# Patient Record
Sex: MALE | Race: WHITE | Employment: OTHER | URBAN - METROPOLITAN AREA
[De-identification: names, ages, dates, MRNs, and addresses within clinical notes are randomized per-mention and may not be internally consistent; named-entity substitution may affect disease eponyms.]

---

## 2017-05-10 DIAGNOSIS — I10 ESSENTIAL HYPERTENSION WITH GOAL BLOOD PRESSURE LESS THAN 140/90: ICD-10-CM

## 2017-05-10 RX ORDER — AMLODIPINE BESYLATE 5 MG/1
TABLET ORAL
Qty: 90 TABLET | Refills: 0 | Status: SHIPPED | OUTPATIENT
Start: 2017-05-10 | End: 2017-08-11

## 2017-05-10 RX ORDER — LISINOPRIL 30 MG/1
TABLET ORAL
Qty: 90 TABLET | Refills: 0 | Status: SHIPPED | OUTPATIENT
Start: 2017-05-10 | End: 2017-08-11

## 2017-08-11 DIAGNOSIS — I10 ESSENTIAL HYPERTENSION WITH GOAL BLOOD PRESSURE LESS THAN 140/90: ICD-10-CM

## 2017-08-11 RX ORDER — AMLODIPINE BESYLATE 5 MG/1
TABLET ORAL
Qty: 30 TABLET | Refills: 0 | Status: SHIPPED | OUTPATIENT
Start: 2017-08-11 | End: 2017-09-08

## 2017-08-11 RX ORDER — LISINOPRIL 30 MG/1
TABLET ORAL
Qty: 30 TABLET | Refills: 0 | Status: SHIPPED | OUTPATIENT
Start: 2017-08-11 | End: 2017-09-08

## 2017-08-11 NOTE — TELEPHONE ENCOUNTER
Pt due for fasting lab appointment and ov for hypertension for further refills.  Saloni Lobato RN

## 2017-08-11 NOTE — LETTER
[facility]  [address] [facility] [address] [phone]    August 11, 2017    Owen Sahni  [address] [facility] [address]    Dear Owen,       We recently received a refill request for amLODIPine (NORVASC) and lisinopril (PRINIVIL,ZESTRIL.  We have refilled this for a one time 30 day supply only because you are due for a:    Blood pressure office visit and fasting lab appointment      Please schedule this lab appointment 4-5 days prior to the office visit.     Please call at your earliest convenience so that there will not be a delay with your future refills.          Thank you,   Your [facility] Care Team/brandon  [phone]

## 2017-08-14 DIAGNOSIS — I10 ESSENTIAL HYPERTENSION WITH GOAL BLOOD PRESSURE LESS THAN 140/90: ICD-10-CM

## 2017-08-15 RX ORDER — AMLODIPINE BESYLATE 5 MG/1
TABLET ORAL
Qty: 90 TABLET | Refills: 0 | OUTPATIENT
Start: 2017-08-15

## 2017-08-15 RX ORDER — LISINOPRIL 30 MG/1
TABLET ORAL
Qty: 90 TABLET | Refills: 0 | OUTPATIENT
Start: 2017-08-15

## 2017-09-08 DIAGNOSIS — I10 ESSENTIAL HYPERTENSION WITH GOAL BLOOD PRESSURE LESS THAN 140/90: ICD-10-CM

## 2017-09-11 NOTE — TELEPHONE ENCOUNTER
Routing refill request to provider for review/approval because:  Jerica given x1 and patient did not follow up, please advise  No pending appointment.     Aisha Allison RN   [facility]

## 2017-09-12 RX ORDER — AMLODIPINE BESYLATE 5 MG/1
5 TABLET ORAL DAILY
Qty: 90 TABLET | Refills: 0 | Status: SHIPPED | OUTPATIENT
Start: 2017-09-12 | End: 2017-12-11

## 2017-09-12 RX ORDER — LISINOPRIL 30 MG/1
TABLET ORAL
COMMUNITY
Start: 2017-09-12 | End: 2017-09-12

## 2017-09-12 RX ORDER — LISINOPRIL 30 MG/1
30 TABLET ORAL DAILY
Qty: 90 TABLET | Refills: 0 | Status: SHIPPED | OUTPATIENT
Start: 2017-09-12 | End: 2017-12-11

## 2017-09-12 RX ORDER — AMLODIPINE BESYLATE 5 MG/1
TABLET ORAL
COMMUNITY
Start: 2017-09-12 | End: 2017-09-12

## 2017-12-11 DIAGNOSIS — I10 ESSENTIAL HYPERTENSION WITH GOAL BLOOD PRESSURE LESS THAN 140/90: ICD-10-CM

## 2017-12-11 NOTE — LETTER
December 12, 2017    Owen Sahni  [address] [facility] [address]    Dear Owen,       We recently received a refill request for lisinopril and amlodipine.  We have refilled this for a one time 30 day supply only because you are due for a:    Blood pressure office visit with provider and fasting lab appointment      Please schedule this lab appointment 4-5 days prior to the office visit.     Please call at your earliest convenience so that there will not be a delay with your future refills.          Thank you,   Your [facility] Team/  [phone]

## 2017-12-12 RX ORDER — LISINOPRIL 30 MG/1
TABLET ORAL
Qty: 30 TABLET | Refills: 0 | Status: SHIPPED | OUTPATIENT
Start: 2017-12-12 | End: 2017-12-22

## 2017-12-12 RX ORDER — AMLODIPINE BESYLATE 5 MG/1
TABLET ORAL
Qty: 30 TABLET | Refills: 0 | Status: SHIPPED | OUTPATIENT
Start: 2017-12-12 | End: 2017-12-22

## 2017-12-12 NOTE — TELEPHONE ENCOUNTER
Medication is being filled for 1 time refill only due to:  Patient needs to be seen because due for fasting lab appt and ov.   Saloni Lobato RN

## 2017-12-19 NOTE — PROGRESS NOTES
SUBJECTIVE:   CC: Owen Sahni is an 57 year old male who presents for preventative health visit.     Healthy Habits:    Answers for HPI/ROS submitted by the patient on 12/22/2017   Annual Exam:  Getting at least 3 servings of Calcium per day:: Yes  Bi-annual eye exam:: Yes  Dental care twice a year:: Yes  Sleep apnea or symptoms of sleep apnea:: None  Diet:: Low salt  Frequency of exercise:: 2-3 days/week  Taking medications regularly:: Yes  Medication side effects:: Other  Additional concerns today:: No  PHQ-2 Score: 0  Duration of exercise:: Less than 15 minutes        .  Loads truck.   Single. No children.    Morbid obesity.     HTN-due for fasting labs.   No chest pain, shortness of breath, edema, PND, or orthopnea. No dizziness or vision changes. No side effects from medications. Blood pressure has been stable on medication.    No PND, orthopnea, or leg edema.      H/O cardiomyopathy seen by cardiology in 2015.               Today's PHQ-2 Score:   PHQ-2 ( 1999 [employer]) 12/22/2017 6/6/2016   Q1: Little interest or pleasure in doing things 0 0   Q2: Feeling down, depressed or hopeless 0 0   PHQ-2 Score 0 0   Q1: Little interest or pleasure in doing things Not at all -   Q2: Feeling down, depressed or hopeless Not at all -   PHQ-2 Score 0 -         Abuse: Current or Past(Physical, Sexual or Emotional)- No  Do you feel safe in your environment - Yes  Social History   Substance Use Topics     Smoking status: Never Smoker     Smokeless tobacco: Never Used      Comment: non-smoking household     Alcohol use Yes      Comment: 6 beers weekly      If you drink alcohol do you typically have >3 drinks per day or >7 drinks per week? No                      Last PSA:   PSA   Date Value Ref Range Status   06/06/2016 0.85 0 - 4 ug/L Final       Reviewed orders with patient. Reviewed health maintenance and updated orders accordingly - Yes  BP Readings from Last 3 Encounters:   12/22/17 130/82   11/10/16 138/84    06/06/16 148/88    Wt Readings from Last 3 Encounters:   12/22/17 288 lb (130.6 kg)   11/10/16 286 lb (129.7 kg)   06/06/16 274 lb (124.3 kg)                  Patient Active Problem List   Diagnosis     Sleep apnea     Lumbosacral radiculopathy     CARDIOVASCULAR SCREENING; LDL GOAL LESS THAN 130     Abnormal EKG     SOB (shortness of breath)     Seafood allergy     AK (actinic keratosis)     Myalgia     Advanced directives, counseling/discussion     Essential hypertension with goal blood pressure less than 140/90     Benign essential hypertension     Hypertrophic obstructive cardiomyopathy (H)     Systolic murmur     Past Surgical History:   Procedure Laterality Date     COLONOSCOPY  4/12/2012    Procedure:COLONOSCOPY; Colonoscopy, screening; Surgeon:ARANZA TEE; Location: OR       Social History   Substance Use Topics     Smoking status: Never Smoker     Smokeless tobacco: Never Used      Comment: non-smoking household     Alcohol use Yes      Comment: 6 beers weekly     Family History   Problem Relation Age of Onset     CANCER Mother      CANCER Father      Hypertension Father      Arrhythmia No family hx of      Myocardial Infarction No family hx of      DIABETES No family hx of      Coronary Artery Disease No family hx of      CEREBROVASCULAR DISEASE No family hx of          Current Outpatient Prescriptions   Medication Sig Dispense Refill     amLODIPine (NORVASC) 5 MG tablet TAKE 1 TABLET (5 MG) BY MOUTH DAILY 90 tablet 3     lisinopril (PRINIVIL,ZESTRIL) 30 MG tablet Take 1 tablet (30 mg) by mouth daily 90 tablet 3     aspirin (SB LOW DOSE ASA EC) 81 MG EC tablet Take 81 mg by mouth daily       multivitamin, therapeutic with minerals (MULTI-VITAMIN) TABS Take 1 tablet by mouth daily.       [DISCONTINUED] amLODIPine (NORVASC) 5 MG tablet TAKE 1 TABLET (5 MG) BY MOUTH DAILY 30 tablet 0     [DISCONTINUED] lisinopril (PRINIVIL,ZESTRIL) 30 MG tablet TAKE 1 TABLET (30 MG) BY MOUTH DAILY 30 tablet 0  "          Reviewed and updated as needed this visit by clinical staffTobacco  Allergies  Meds  Med Hx  Surg Hx  Fam Hx  Soc Hx        Reviewed and updated as needed this visit by Provider        Past Medical History:   Diagnosis Date     Hypertension       Past Surgical History:   Procedure Laterality Date     COLONOSCOPY  4/12/2012    Procedure:COLONOSCOPY; Colonoscopy, screening; Surgeon:ARANZA TEE; Location:[facility] OR       ROS:  C: NEGATIVE for fever, chills, change in weight  I: NEGATIVE for worrisome rashes, moles or lesions  E: NEGATIVE for vision changes or irritation  ENT: NEGATIVE for ear, mouth and throat problems  R: NEGATIVE for significant cough or SOB  CV: NEGATIVE for chest pain, palpitations or peripheral edema  GI: NEGATIVE for nausea, abdominal pain, heartburn, or change in bowel habits   male: negative for dysuria, hematuria, decreased urinary stream, erectile dysfunction, urethral discharge  M: NEGATIVE for significant arthralgias or myalgia  N: NEGATIVE for weakness, dizziness or paresthesias  P: NEGATIVE for changes in mood or affect    OBJECTIVE:   /89  Pulse 85  Temp 98.2  F (36.8  C) (Oral)  Ht 5' 11\" (1.803 m)  Wt 288 lb (130.6 kg)  SpO2 96%  BMI 40.17 kg/m2  EXAM:  Genera: obese, no distress  EYES: Eyes grossly normal to inspection, PERRL and conjunctivae and sclerae normal  HENT: ear canals and TM's normal, nose and mouth without ulcers or lesions  NECK: no adenopathy, no asymmetry, masses, or scars and thyroid normal to palpation  RESP: lungs clear to auscultation - no rales, rhonchi or wheezes  CV: harsh systolic murmur, as previously noted by cardiology, no change  ABDOMEN: soft, nontender, no hepatosplenomegaly, no masses and bowel sounds normal  MS: no gross musculoskeletal defects noted, no edema  SKIN: no suspicious lesions or rashes  NEURO: Normal strength and tone, mentation intact and speech normal  PSYCH: mentation appears normal, affect " "normal/bright    ASSESSMENT/PLAN:   1. Encounter for routine adult health examination with abnormal findings      2. Morbid obesity (H)      3. Benign essential hypertension    - Comprehensive metabolic panel    4. Screening for diabetes mellitus      5. Lipid screening    - Lipid panel reflex to direct LDL Fasting    6. Screening for prostate cancer    - PSA, screen    7. Essential hypertension with goal blood pressure less than 140/90  To goal  Recheck 1 year    - amLODIPine (NORVASC) 5 MG tablet; TAKE 1 TABLET (5 MG) BY MOUTH DAILY  Dispense: 90 tablet; Refill: 3  - lisinopril (PRINIVIL,ZESTRIL) 30 MG tablet; Take 1 tablet (30 mg) by mouth daily  Dispense: 90 tablet; Refill: 3    COUNSELING:  Reviewed preventive health counseling, as reflected in patient instructions       Regular exercise       Healthy diet/nutrition       Vision screening       Hearing screening       reports that he has never smoked. He has never used smokeless tobacco.      Estimated body mass index is 40.17 kg/(m^2) as calculated from the following:    Height as of this encounter: 5' 11\" (1.803 m).    Weight as of this encounter: 288 lb (130.6 kg).   Weight management plan: Discussed healthy diet and exercise guidelines and patient will follow up in 12 months in clinic to re-evaluate.    Counseling Resources:  ATP IV Guidelines  Pooled Cohorts Equation Calculator  FRAX Risk Assessment  ICSI Preventive Guidelines  Dietary Guidelines for Americans, 2010  USDA's MyPlate  ASA Prophylaxis  Lung CA Screening      Patient Instructions   Lifestyle recommendations:  Being overweight or obese puts you are risk of major health problems including but not limited to: heart disease/heart attack, stroke, high cholesterol, high blood pressure, and diabetes.  This is why it is important to be at a healthy weight for your height.     Exercise 30 minutes 3-5 times a week, if you can only do 10 minutes 3 times a week that is still shown to have great benefit!  " Brisk walking even counts for this.  Consider free youtube videos for exercise that fits your needs and lifestyle.     Monitor your caffeine and soda intake, try to minimize these beverages    Drink plenty of water (about 70-80 ounces a day)    Try to eat a vegetable and fruit  with lunch and dinner.  Have a breakfast that contains protein such as eggs or oatmeal.  Decrease your white bread, pasta, and sweets intake.  Increase lean proteins like chicken or pork. Try to eat out 1-2 times a week or less.  Monitor your portion sizes, try using smaller plates if needed.  Eat slowly, this gives you time to be aware that your body is full.   Let me know at any time if you would like a referral to a nutritionist!        Preventive Health Recommendations  Male Ages 50 - 64    Yearly exam:             See your health care provider every year in order to  o   Review health changes.   o   Discuss preventive care.    o   Review your medicines if your doctor has prescribed any.     Have a cholesterol test every 5 years, or more frequently if you are at risk for high cholesterol/heart disease.     Have a diabetes test (fasting glucose) every three years. If you are at risk for diabetes, you should have this test more often.     Have a colonoscopy at age 50, or have a yearly FIT test (stool test). These exams will check for colon cancer.      Talk with your health care provider about whether or not a prostate cancer screening test (PSA) is right for you.    You should be tested each year for STDs (sexually transmitted diseases), if you re at risk.     Shots: Get a flu shot each year. Get a tetanus shot every 10 years.     Nutrition:    Eat at least 5 servings of fruits and vegetables daily.     Eat whole-grain bread, whole-wheat pasta and brown rice instead of white grains and rice.     Talk to your provider about Calcium and Vitamin D.     Lifestyle    Exercise for at least 150 minutes a week (30 minutes a day, 5 days a week).  This will help you control your weight and prevent disease.     Limit alcohol to one drink per day.     No smoking.     Wear sunscreen to prevent skin cancer.     See your dentist every six months for an exam and cleaning.     See your eye doctor every 1 to 2 years.        Tracie Fishman PA-C  [facility]

## 2017-12-19 NOTE — PATIENT INSTRUCTIONS
Lifestyle recommendations:  Being overweight or obese puts you are risk of major health problems including but not limited to: heart disease/heart attack, stroke, high cholesterol, high blood pressure, and diabetes.  This is why it is important to be at a healthy weight for your height.     Exercise 30 minutes 3-5 times a week, if you can only do 10 minutes 3 times a week that is still shown to have great benefit!  Brisk walking even counts for this.  Consider free youtube videos for exercise that fits your needs and lifestyle.     Monitor your caffeine and soda intake, try to minimize these beverages    Drink plenty of water (about 70-80 ounces a day)    Try to eat a vegetable and fruit  with lunch and dinner.  Have a breakfast that contains protein such as eggs or oatmeal.  Decrease your white bread, pasta, and sweets intake.  Increase lean proteins like chicken or pork. Try to eat out 1-2 times a week or less.  Monitor your portion sizes, try using smaller plates if needed.  Eat slowly, this gives you time to be aware that your body is full.   Let me know at any time if you would like a referral to a nutritionist!        Preventive Health Recommendations  Male Ages 50   64    Yearly exam:             See your health care provider every year in order to  o   Review health changes.   o   Discuss preventive care.    o   Review your medicines if your doctor has prescribed any.     Have a cholesterol test every 5 years, or more frequently if you are at risk for high cholesterol/heart disease.     Have a diabetes test (fasting glucose) every three years. If you are at risk for diabetes, you should have this test more often.     Have a colonoscopy at age 50, or have a yearly FIT test (stool test). These exams will check for colon cancer.      Talk with your health care provider about whether or not a prostate cancer screening test (PSA) is right for you.    You should be tested each year for STDs (sexually transmitted  diseases), if you re at risk.     Shots: Get a flu shot each year. Get a tetanus shot every 10 years.     Nutrition:    Eat at least 5 servings of fruits and vegetables daily.     Eat whole-grain bread, whole-wheat pasta and brown rice instead of white grains and rice.     Talk to your provider about Calcium and Vitamin D.     Lifestyle    Exercise for at least 150 minutes a week (30 minutes a day, 5 days a week). This will help you control your weight and prevent disease.     Limit alcohol to one drink per day.     No smoking.     Wear sunscreen to prevent skin cancer.     See your dentist every six months for an exam and cleaning.     See your eye doctor every 1 to 2 years.

## 2017-12-22 ENCOUNTER — OFFICE VISIT (OUTPATIENT)
Dept: FAMILY MEDICINE | Facility: CLINIC | Age: 57
End: 2017-12-22
Payer: COMMERCIAL

## 2017-12-22 VITALS
TEMPERATURE: 98.2 F | SYSTOLIC BLOOD PRESSURE: 130 MMHG | BODY MASS INDEX: 40.32 KG/M2 | HEIGHT: 71 IN | DIASTOLIC BLOOD PRESSURE: 82 MMHG | HEART RATE: 85 BPM | WEIGHT: 288 LBS | OXYGEN SATURATION: 96 %

## 2017-12-22 DIAGNOSIS — Z13.1 SCREENING FOR DIABETES MELLITUS: ICD-10-CM

## 2017-12-22 DIAGNOSIS — Z13.220 LIPID SCREENING: ICD-10-CM

## 2017-12-22 DIAGNOSIS — Z12.5 SCREENING FOR PROSTATE CANCER: ICD-10-CM

## 2017-12-22 DIAGNOSIS — Z00.01 ENCOUNTER FOR ROUTINE ADULT HEALTH EXAMINATION WITH ABNORMAL FINDINGS: Primary | ICD-10-CM

## 2017-12-22 DIAGNOSIS — I10 BENIGN ESSENTIAL HYPERTENSION: ICD-10-CM

## 2017-12-22 DIAGNOSIS — E66.01 MORBID OBESITY (H): ICD-10-CM

## 2017-12-22 DIAGNOSIS — I10 ESSENTIAL HYPERTENSION WITH GOAL BLOOD PRESSURE LESS THAN 140/90: ICD-10-CM

## 2017-12-22 PROBLEM — R01.1 SYSTOLIC MURMUR: Status: ACTIVE | Noted: 2017-12-22

## 2017-12-22 PROBLEM — I42.1 HYPERTROPHIC OBSTRUCTIVE CARDIOMYOPATHY (H): Status: ACTIVE | Noted: 2017-12-22

## 2017-12-22 LAB
ALBUMIN SERPL-MCNC: 3.8 G/DL (ref 3.4–5)
ALP SERPL-CCNC: 64 U/L (ref 40–150)
ALT SERPL W P-5'-P-CCNC: 60 U/L (ref 0–70)
ANION GAP SERPL CALCULATED.3IONS-SCNC: 9 MMOL/L (ref 3–14)
AST SERPL W P-5'-P-CCNC: 26 U/L (ref 0–45)
BILIRUB SERPL-MCNC: 0.2 MG/DL (ref 0.2–1.3)
BUN SERPL-MCNC: 20 MG/DL (ref 7–30)
CALCIUM SERPL-MCNC: 9 MG/DL (ref 8.5–10.1)
CHLORIDE SERPL-SCNC: 107 MMOL/L (ref 94–109)
CHOLEST SERPL-MCNC: 195 MG/DL
CO2 SERPL-SCNC: 24 MMOL/L (ref 20–32)
CREAT SERPL-MCNC: 1 MG/DL (ref 0.66–1.25)
GFR SERPL CREATININE-BSD FRML MDRD: 77 ML/MIN/1.7M2
GLUCOSE SERPL-MCNC: 99 MG/DL (ref 70–99)
HDLC SERPL-MCNC: 50 MG/DL
LDLC SERPL CALC-MCNC: 120 MG/DL
NONHDLC SERPL-MCNC: 145 MG/DL
POTASSIUM SERPL-SCNC: 4.3 MMOL/L (ref 3.4–5.3)
PROT SERPL-MCNC: 7.6 G/DL (ref 6.8–8.8)
PSA SERPL-ACNC: 0.67 UG/L (ref 0–4)
SODIUM SERPL-SCNC: 140 MMOL/L (ref 133–144)
TRIGL SERPL-MCNC: 125 MG/DL

## 2017-12-22 PROCEDURE — G0103 PSA SCREENING: HCPCS | Performed by: PHYSICIAN ASSISTANT

## 2017-12-22 PROCEDURE — 80061 LIPID PANEL: CPT | Performed by: PHYSICIAN ASSISTANT

## 2017-12-22 PROCEDURE — 80053 COMPREHEN METABOLIC PANEL: CPT | Performed by: PHYSICIAN ASSISTANT

## 2017-12-22 PROCEDURE — 99396 PREV VISIT EST AGE 40-64: CPT | Mod: 25 | Performed by: PHYSICIAN ASSISTANT

## 2017-12-22 PROCEDURE — 36415 COLL VENOUS BLD VENIPUNCTURE: CPT | Performed by: PHYSICIAN ASSISTANT

## 2017-12-22 RX ORDER — AMLODIPINE BESYLATE 5 MG/1
TABLET ORAL
Qty: 90 TABLET | Refills: 3 | Status: SHIPPED | OUTPATIENT
Start: 2017-12-22 | End: 2018-06-08

## 2017-12-22 RX ORDER — LISINOPRIL 30 MG/1
30 TABLET ORAL DAILY
Qty: 90 TABLET | Refills: 3 | Status: SHIPPED | OUTPATIENT
Start: 2017-12-22 | End: 2018-03-16

## 2017-12-22 NOTE — PROGRESS NOTES
Dear Owen,      It was a pleasure to see you at your recent office visit.  Your test results are listed below.  Prostate screening is negative/normal. Cholesterol has improved. Kidney function and liver function normal. Blood sugar normal no diabetes.         If you have any questions or concerns, please call the clinic at [phone].    Sincerely,  Tracie Fishman PA-C

## 2017-12-22 NOTE — NURSING NOTE
"Chief Complaint   Patient presents with     Physical     LINH, pt is fasting        Initial /89  Pulse 85  Temp 98.2  F (36.8  C) (Oral)  Ht 5' 11\" (1.803 m)  Wt 288 lb (130.6 kg)  SpO2 96%  BMI 40.17 kg/m2 Estimated body mass index is 40.17 kg/(m^2) as calculated from the following:    Height as of this encounter: 5' 11\" (1.803 m).    Weight as of this encounter: 288 lb (130.6 kg).  Medication Reconciliation: complete      Giuseppe Chin MA    "

## 2017-12-22 NOTE — LETTER
December 22, 2017    Owen Sahni  [address] [facility] [address]        Dear Owen,    It was a pleasure to see you at your recent office visit.  Your test results are listed below.  Prostate screening is negative/normal. Cholesterol has improved. Kidney function and liver function normal. Blood sugar normal no diabetes.         If you have any questions or concerns, please call the clinic at [phone].    Sincerely,  Tracie Fishman PA-C    Results for orders placed or performed in visit on 12/22/17   Comprehensive metabolic panel   Result Value Ref Range    Sodium 140 133 - 144 mmol/L    Potassium 4.3 3.4 - 5.3 mmol/L    Chloride 107 94 - 109 mmol/L    Carbon Dioxide 24 20 - 32 mmol/L    Anion Gap 9 3 - 14 mmol/L    Glucose 99 70 - 99 mg/dL    Urea Nitrogen 20 7 - 30 mg/dL    Creatinine 1.00 0.66 - 1.25 mg/dL    GFR Estimate 77 >60 mL/min/1.7m2    GFR Estimate If Black >90 >60 mL/min/1.7m2    Calcium 9.0 8.5 - 10.1 mg/dL    Bilirubin Total 0.2 0.2 - 1.3 mg/dL    Albumin 3.8 3.4 - 5.0 g/dL    Protein Total 7.6 6.8 - 8.8 g/dL    Alkaline Phosphatase 64 40 - 150 U/L    ALT 60 0 - 70 U/L    AST 26 0 - 45 U/L   Lipid panel reflex to direct LDL Fasting   Result Value Ref Range    Cholesterol 195 <200 mg/dL    Triglycerides 125 <150 mg/dL    HDL Cholesterol 50 >39 mg/dL    LDL Cholesterol Calculated 120 (H) <100 mg/dL    Non HDL Cholesterol 145 (H) <130 mg/dL   PSA, screen   Result Value Ref Range    PSA 0.67 0 - 4 ug/L

## 2017-12-22 NOTE — MR AVS SNAPSHOT
After Visit Summary   12/22/2017    Owen Sahni    MRN: [id number]           Patient Information     Date Of Birth          1960        Visit Information        Provider Department      12/22/2017 7:20 AM Tracie Fishman PA-C [facility]        Today's Diagnoses     Encounter for routine adult health examination with abnormal findings    -  1    Morbid obesity (H)        Benign essential hypertension        Screening for diabetes mellitus        Lipid screening        Screening for prostate cancer        Essential hypertension with goal blood pressure less than 140/90          Care Instructions    Lifestyle recommendations:  Being overweight or obese puts you are risk of major health problems including but not limited to: heart disease/heart attack, stroke, high cholesterol, high blood pressure, and diabetes.  This is why it is important to be at a healthy weight for your height.     Exercise 30 minutes 3-5 times a week, if you can only do 10 minutes 3 times a week that is still shown to have great benefit!  Brisk walking even counts for this.  Consider free youtube videos for exercise that fits your needs and lifestyle.     Monitor your caffeine and soda intake, try to minimize these beverages    Drink plenty of water (about 70-80 ounces a day)    Try to eat a vegetable and fruit  with lunch and dinner.  Have a breakfast that contains protein such as eggs or oatmeal.  Decrease your white bread, pasta, and sweets intake.  Increase lean proteins like chicken or pork. Try to eat out 1-2 times a week or less.  Monitor your portion sizes, try using smaller plates if needed.  Eat slowly, this gives you time to be aware that your body is full.   Let me know at any time if you would like a referral to a nutritionist!        Preventive Health Recommendations  Male Ages 50 - 64    Yearly exam:             See your health care provider every year in order to  o   Review health  changes.   o   Discuss preventive care.    o   Review your medicines if your doctor has prescribed any.     Have a cholesterol test every 5 years, or more frequently if you are at risk for high cholesterol/heart disease.     Have a diabetes test (fasting glucose) every three years. If you are at risk for diabetes, you should have this test more often.     Have a colonoscopy at age 50, or have a yearly FIT test (stool test). These exams will check for colon cancer.      Talk with your health care provider about whether or not a prostate cancer screening test (PSA) is right for you.    You should be tested each year for STDs (sexually transmitted diseases), if you re at risk.     Shots: Get a flu shot each year. Get a tetanus shot every 10 years.     Nutrition:    Eat at least 5 servings of fruits and vegetables daily.     Eat whole-grain bread, whole-wheat pasta and brown rice instead of white grains and rice.     Talk to your provider about Calcium and Vitamin D.     Lifestyle    Exercise for at least 150 minutes a week (30 minutes a day, 5 days a week). This will help you control your weight and prevent disease.     Limit alcohol to one drink per day.     No smoking.     Wear sunscreen to prevent skin cancer.     See your dentist every six months for an exam and cleaning.     See your eye doctor every 1 to 2 years.            Follow-ups after your visit        Who to contact     If you have questions or need follow up information about today's clinic visit or your schedule please contact [facility] directly at [phone].  Normal or non-critical lab and imaging results will be communicated to you by MyChart, letter or phone within 4 business days after the clinic has received the results. If you do not hear from us within 7 days, please contact the clinic through MyChart or phone. If you have a critical or abnormal lab result, we will notify you by phone as soon as possible.  Submit refill requests  "through [employer] or call your pharmacy and they will forward the refill request to us. Please allow 3 business days for your refill to be completed.          Additional Information About Your Visit        Care EveryWhere ID     This is your Care EveryWhere ID. This could be used by other organizations to access your [facility] medical records  [id number]        Your Vitals Were     Pulse Temperature Height Pulse Oximetry BMI (Body Mass Index)       85 98.2  F (36.8  C) (Oral) 5' 11\" (1.803 m) 96% 40.17 kg/m2        Blood Pressure from Last 3 Encounters:   12/22/17 130/82   11/10/16 138/84   06/06/16 148/88    Weight from Last 3 Encounters:   12/22/17 288 lb (130.6 kg)   11/10/16 286 lb (129.7 kg)   06/06/16 274 lb (124.3 kg)              We Performed the Following     Comprehensive metabolic panel     Lipid panel reflex to direct LDL Fasting     PSA, screen          Today's Medication Changes          These changes are accurate as of: 12/22/17  7:50 AM.  If you have any questions, ask your nurse or doctor.               These medicines have changed or have updated prescriptions.        Dose/Directions    amLODIPine 5 MG tablet   Commonly known as:  NORVASC   This may have changed:  See the new instructions.   Used for:  Essential hypertension with goal blood pressure less than 140/90   Changed by:  Tracie Fishman PA-C        TAKE 1 TABLET (5 MG) BY MOUTH DAILY   Quantity:  90 tablet   Refills:  3       lisinopril 30 MG tablet   Commonly known as:  PRINIVIL,ZESTRIL   This may have changed:  See the new instructions.   Used for:  Essential hypertension with goal blood pressure less than 140/90   Changed by:  Tracie Fishman PA-C        Dose:  30 mg   Take 1 tablet (30 mg) by mouth daily   Quantity:  90 tablet   Refills:  3            Where to get your medicines      These medications were sent to [facility]/pharmacy #[id number] - [place], [place] - [address],  AT [address] [place] [address] " [address] [facility] [address]     Phone:  [phone]     amLODIPine 5 MG tablet    lisinopril 30 MG tablet                Primary Care Provider Office Phone # Fax #    Braulio Coronel [phone]       [address] [facility] [address]        Equal Access to Services     ISHAN BONILLA : Hadii aad ku hadasho Soomaali, waaxda luqadaha, qaybta kaalmada adeegyada, waxay idiin hayaan adeeg kharash la'aan . So [facility] [phone].    ATENCIÓN: Si habla español, tiene a levy disposición servicios gratuitos de asistencia lingüística. Llame al [phone].    We comply with applicable federal civil rights laws and [place] laws. We do not discriminate on the basis of race, color, national origin, age, disability, sex, sexual orientation, or gender identity.            Thank you!     Thank you for choosing [facility]  for your care. Our goal is always to provide you with excellent care. Hearing back from our patients is one way we can continue to improve our services. Please take a few minutes to complete the written survey that you may receive in the mail after your visit with us. Thank you!             Your Updated Medication List - Protect others around you: Learn how to safely use, store and throw away your medicines at www.disposemymeds.org.          This list is accurate as of: 12/22/17  7:50 AM.  Always use your most recent med list.                   Brand Name Dispense Instructions for use Diagnosis    amLODIPine 5 MG tablet    NORVASC    90 tablet    TAKE 1 TABLET (5 MG) BY MOUTH DAILY    Essential hypertension with goal blood pressure less than 140/90       lisinopril 30 MG tablet    PRINIVIL,ZESTRIL    90 tablet    Take 1 tablet (30 mg) by mouth daily    Essential hypertension with goal blood pressure less than 140/90       Multi-vitamin Tabs tablet      Take 1 tablet by mouth daily.        SB LOW DOSE ASA EC 81 MG EC tablet   Generic drug:  aspirin      Take 81 mg by  mouth daily

## 2018-03-09 ENCOUNTER — TELEPHONE (OUTPATIENT)
Dept: FAMILY MEDICINE | Facility: CLINIC | Age: 58
End: 2018-03-09

## 2018-03-09 NOTE — LETTER
March 9, 2018    Owen Sahni  [address] [facility] [address]        RE: Owen Sahni     Patient has been seen and treated for years at our clinic for hypertension.  He has been fairly well controlled on his medications and stable.  There have been no recent changes to medications.    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      Please contact me for questions or concerns.        Sincerely,           Tracie Fishman PA-C

## 2018-03-09 NOTE — TELEPHONE ENCOUNTER
Patient states that he faxed something to Tracie that has to be completed and faxed back right away as he is having a DOT physical right now and needs it for it.    Thank you.

## 2018-03-09 NOTE — TELEPHONE ENCOUNTER
Got fax from [employer] requesting a letter from Tracie Fishman in regards to Chronic Medical conditions requiring medication.    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    Tori Templeton MA/TC

## 2018-03-09 NOTE — TELEPHONE ENCOUNTER
Faxed note to [employer] @ [phone]. Called and informed patient.Tori Templeton MA/SLADE

## 2018-03-09 NOTE — LETTER
[facility]  [address] [facility] [place] [address]  Phone: [phone]    March 9, 2018        Owen Sahni  [address] [facility] [facility] [address]          To whom it may concern:    RE: Owen Sahni    Patient has been seen and treated for years at our clinic for hypertension.  He has been fairly well controlled on his medications and stable.  There have been no recent changes to medications.    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     Please contact me for questions or concerns.      Sincerely,        MARIO SHEETS MD

## 2018-03-09 NOTE — TELEPHONE ENCOUNTER
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     Tracie Fishman PA-C

## 2018-03-16 ENCOUNTER — OFFICE VISIT (OUTPATIENT)
Dept: FAMILY MEDICINE | Facility: CLINIC | Age: 58
End: 2018-03-16
Payer: COMMERCIAL

## 2018-03-16 VITALS
BODY MASS INDEX: 42.7 KG/M2 | WEIGHT: 305 LBS | SYSTOLIC BLOOD PRESSURE: 142 MMHG | HEIGHT: 71 IN | DIASTOLIC BLOOD PRESSURE: 80 MMHG | HEART RATE: 74 BPM

## 2018-03-16 DIAGNOSIS — R01.1 SYSTOLIC MURMUR: ICD-10-CM

## 2018-03-16 DIAGNOSIS — I10 BENIGN ESSENTIAL HYPERTENSION: Primary | ICD-10-CM

## 2018-03-16 PROCEDURE — 99214 OFFICE O/P EST MOD 30 MIN: CPT | Performed by: PHYSICIAN ASSISTANT

## 2018-03-16 RX ORDER — LOSARTAN POTASSIUM 50 MG/1
50 TABLET ORAL DAILY
Qty: 30 TABLET | Refills: 1 | Status: SHIPPED | OUTPATIENT
Start: 2018-03-16 | End: 2018-05-17

## 2018-03-16 RX ORDER — HYDROCHLOROTHIAZIDE 12.5 MG/1
12.5 TABLET ORAL DAILY
Qty: 30 TABLET | Refills: 0 | Status: SHIPPED | OUTPATIENT
Start: 2018-03-16 | End: 2018-04-14

## 2018-03-16 NOTE — PATIENT INSTRUCTIONS
Recheck blood pressure in 2-3 weeks  Stop lisinopril start losartan instead, monitor cough this should improve  Continue your other blood pressure medications  Add HCTZ in the morning for blood pressure, this will make you pee more in the morning likely  We will repeat kidney function at next visit

## 2018-03-16 NOTE — MR AVS SNAPSHOT
After Visit Summary   3/16/2018    Owen Sahni    MRN: [id number]           Patient Information     Date Of Birth          1960        Visit Information        Provider Department      3/16/2018 9:20 AM Tracie Fishman PA-C [facility]        Today's Diagnoses     Benign essential hypertension    -  1      Care Instructions    Recheck blood pressure in 2-3 weeks  Stop lisinopril start losartan instead, monitor cough this should improve  Continue your other blood pressure medications  Add HCTZ in the morning for blood pressure, this will make you pee more in the morning likely  We will repeat kidney function at next visit              Follow-ups after your visit        Future tests that were ordered for you today     Open Future Orders        Priority Expected Expires Ordered    Basic metabolic panel Routine  6/16/2018 3/16/2018    BNP-N terminal pro Routine  6/16/2018 3/16/2018            Who to contact     If you have questions or need follow up information about today's clinic visit or your schedule please contact [facility] directly at [phone].  Normal or non-critical lab and imaging results will be communicated to you by MyChart, letter or phone within 4 business days after the clinic has received the results. If you do not hear from us within 7 days, please contact the clinic through MyChart or phone. If you have a critical or abnormal lab result, we will notify you by phone as soon as possible.  Submit refill requests through [employer] or call your pharmacy and they will forward the refill request to us. Please allow 3 business days for your refill to be completed.          Additional Information About Your Visit        Care EveryWhere ID     This is your Care EveryWhere ID. This could be used by other organizations to access your [facility] medical records  [id number]        Your Vitals Were     Pulse Height BMI (Body Mass Index)             74  "5' 11\" (1.803 m) 42.54 kg/m2          Blood Pressure from Last 3 Encounters:   03/16/18 142/80   12/22/17 130/82   11/10/16 138/84    Weight from Last 3 Encounters:   03/16/18 (!) 305 lb (138.3 kg)   12/22/17 288 lb (130.6 kg)   11/10/16 286 lb (129.7 kg)                 Today's Medication Changes          These changes are accurate as of 3/16/18  9:58 AM.  If you have any questions, ask your nurse or doctor.               Start taking these medicines.        Dose/Directions    hydrochlorothiazide 12.5 MG Tabs tablet   Used for:  Benign essential hypertension   Started by:  Tracie Fishman PA-C        Dose:  12.5 mg   Take 1 tablet (12.5 mg) by mouth daily Take in the morning   Quantity:  30 tablet   Refills:  0       losartan 50 MG tablet   Commonly known as:  COZAAR   Used for:  Benign essential hypertension   Started by:  Tracie Fishman PA-C        Dose:  50 mg   Take 1 tablet (50 mg) by mouth daily Stop lisinopril.  Start this instead.   Quantity:  30 tablet   Refills:  1         Stop taking these medicines if you haven't already. Please contact your care team if you have questions.     lisinopril 30 MG tablet   Commonly known as:  PRINIVIL,ZESTRIL   Stopped by:  Tracie Fishman PA-C                Where to get your medicines      These medications were sent to [facility]/pharmacy #[id number] - [address], [facility] [address]     Phone:  [phone]     hydrochlorothiazide 12.5 MG Tabs tablet    losartan 50 MG tablet                Primary Care Provider Office Phone # Fax #    Braulio Coronel [phone] [address] [facility] [address]        Equal Access to Services     [facility] : Anh Sarabia, waalfredo luqadaha, qaybta kaalbrunilda lewis. [facility] [phone].    ATENCIÓN: Si miguel connolly, tiene a levy disposición " servicios gratuitos de asistencia lingüística. Basilio joyce [phone].    We comply with applicable federal civil rights laws and [place] laws. We do not discriminate on the basis of race, color, national origin, age, disability, sex, sexual orientation, or gender identity.            Thank you!     Thank you for choosing [facility]  [facility] your [facility]. Our goal is always to provide you with excellent care. Hearing back from our patients is one way we can continue to improve our services. Please take a few minutes to complete the written survey that you may receive in the mail after your visit with us. Thank you!             Your Updated Medication List - Protect others around you: Learn how to safely use, store and throw away your medicines at www.disposemymeds.org.          This list is accurate as of 3/16/18  9:58 AM.  Always use your most recent med list.                   Brand Name Dispense Instructions for use Diagnosis    amLODIPine 5 MG tablet    NORVASC    90 tablet    TAKE 1 TABLET (5 MG) BY MOUTH DAILY    Essential hypertension with goal blood pressure less than 140/90       hydrochlorothiazide 12.5 MG Tabs tablet     30 tablet    Take 1 tablet (12.5 mg) by mouth daily Take in the morning    Benign essential hypertension       losartan 50 MG tablet    COZAAR    30 tablet    Take 1 tablet (50 mg) by mouth daily Stop lisinopril.  Start this instead.    Benign essential hypertension       Multi-vitamin Tabs tablet      Take 1 tablet by mouth daily.        SB LOW DOSE ASA EC 81 MG EC tablet   Generic drug:  aspirin      Take 81 mg by mouth daily

## 2018-03-16 NOTE — PROGRESS NOTES
SUBJECTIVE:   Owen Sahni is a 57 year old male who presents to clinic today for the following health issues:      Hypertension Follow-up      Outpatient blood pressures Attempts to    Low Salt Diet: no added salt      Amount of exercise or physical activity: active lifestyle    Problems taking medications regularly: No    Medication side effects: none    Diet: no added salt      Failed dot blood pressure was 160/90.  He had to wait 5 hours there so he was upset.  Blood pressure is still high here however so will add third medication.     Drinking only 2 beers/week now.   Is morbidly obese.   Had ankle edema once the other day. Otherwise none.   No chest pain or shortness of breath.  Has noticed more cough with increase in lisinopril.  Bothers him, will switch to losartan.         Strong FH of HTN per patient.     Last echo showed hypertrophic cardiomyopathy with normal EF.     Has systolic murmur.     Both parents  in 60s from cancer.       Problem list and histories reviewed & adjusted, as indicated.  Additional history: as documented    Patient Active Problem List   Diagnosis     Sleep apnea     Lumbosacral radiculopathy     CARDIOVASCULAR SCREENING; LDL GOAL LESS THAN 130     Abnormal EKG     SOB (shortness of breath)     Seafood allergy     AK (actinic keratosis)     Myalgia     Advanced directives, counseling/discussion     Essential hypertension with goal blood pressure less than 140/90     Benign essential hypertension     Hypertrophic obstructive cardiomyopathy (H)     Systolic murmur     Past Surgical History:   Procedure Laterality Date     COLONOSCOPY  2012    Procedure:COLONOSCOPY; Colonoscopy, screening; Surgeon:ARANZA TEE; Location: OR       Social History   Substance Use Topics     Smoking status: Never Smoker     Smokeless tobacco: Never Used      Comment: non-smoking household     Alcohol use Yes      Comment: 6 beers weekly     Family History   Problem Relation Age of Onset  "    CANCER Mother      CANCER Father      Hypertension Father      Arrhythmia No family hx of      Myocardial Infarction No family hx of      DIABETES No family hx of      Coronary Artery Disease No family hx of      CEREBROVASCULAR DISEASE No family hx of          Current Outpatient Prescriptions   Medication Sig Dispense Refill     hydrochlorothiazide 12.5 MG TABS tablet Take 1 tablet (12.5 mg) by mouth daily Take in the morning 30 tablet 0     losartan (COZAAR) 50 MG tablet Take 1 tablet (50 mg) by mouth daily Stop lisinopril.  Start this instead. 30 tablet 1     amLODIPine (NORVASC) 5 MG tablet TAKE 1 TABLET (5 MG) BY MOUTH DAILY 90 tablet 3     aspirin (SB LOW DOSE ASA EC) 81 MG EC tablet Take 81 mg by mouth daily       multivitamin, therapeutic with minerals (MULTI-VITAMIN) TABS Take 1 tablet by mouth daily.       No Known Allergies    Reviewed and updated as needed this visit by clinical staff       Reviewed and updated as needed this visit by Provider         ROS:  Constitutional, HEENT, cardiovascular, pulmonary, GI, , musculoskeletal, neuro, skin, endocrine and psych systems are negative, except as otherwise noted.    OBJECTIVE:     /80  Pulse 74  Ht 5' 11\" (1.803 m)  Wt (!) 305 lb (138.3 kg)  BMI 42.54 kg/m2  Body mass index is 42.54 kg/(m^2).  GENERAL: healthy, alert and no distress  RESP: lungs clear to auscultation - no rales, rhonchi or wheezes  CV: regular rates and rhythm, peripheral pulses strong and no peripheral edema, systolic harsh murmur heard  MS: no gross musculoskeletal defects noted, no edema  NEURO: Normal strength and tone, mentation intact and speech normal  PSYCH: mentation appears normal, affect normal/bright        ASSESSMENT/PLAN:     ASSESSMENT / PLAN:  (I10) Benign essential hypertension  (primary encounter diagnosis)  Comment: uncontrolled, see below.   Plan: hydrochlorothiazide 12.5 MG TABS tablet,         losartan (COZAAR) 50 MG tablet, Basic metabolic        panel, " BNP-N terminal pro        See below    (R01.1) Systolic murmur  Comment:   Plan: will repeat echo if BNP has increased significantly from previous , will get labs in 2-3 weeks at next appt      Patient Instructions   Recheck blood pressure in 2-3 weeks  Stop lisinopril start losartan instead, monitor cough this should improve  Continue your other blood pressure medications  Add HCTZ in the morning for blood pressure, this will make you pee more in the morning likely  We will repeat kidney function at next visit              Tracie Fishman PA-C  [facility]

## 2018-04-14 DIAGNOSIS — I10 BENIGN ESSENTIAL HYPERTENSION: ICD-10-CM

## 2018-04-14 NOTE — LETTER
April 16, 2018    Owen Sahni  [address] [address] [address]    Dear Owen,       We recently received a refill request for hydrochlorothiazide 12.5 MG TABS tablet.  We have refilled this for a one time 30 day supply only because you are due for a:    Blood pressure/medication check office visit and fasting lab appointment      Please schedule this lab appointment 4-5 days prior to the office visit.     Please call at your earliest convenience so that there will not be a delay with your future refills.          Thank you,   Your [facility] Team/  [phone]

## 2018-04-16 RX ORDER — HYDROCHLOROTHIAZIDE 12.5 MG/1
TABLET ORAL
Qty: 30 TABLET | Refills: 0 | Status: SHIPPED | OUTPATIENT
Start: 2018-04-16 | End: 2018-06-08

## 2018-04-16 NOTE — TELEPHONE ENCOUNTER
Medication is being filled for 1 time refill only due to:  pt needs provider appt for htn        - please send letter  Anjelica BUTLERN, RN, CPN

## 2018-05-01 DIAGNOSIS — I10 BENIGN ESSENTIAL HYPERTENSION: ICD-10-CM

## 2018-05-04 RX ORDER — LOSARTAN POTASSIUM 50 MG/1
50 TABLET ORAL DAILY
Qty: 30 TABLET | Refills: 1 | OUTPATIENT
Start: 2018-05-04

## 2018-05-04 RX ORDER — HYDROCHLOROTHIAZIDE 12.5 MG/1
TABLET ORAL
Qty: 30 TABLET | Refills: 0 | OUTPATIENT
Start: 2018-05-04

## 2018-05-04 NOTE — TELEPHONE ENCOUNTER
Routing refill request to provider for review/approval because:  Jerica given x1 and patient did not follow up, please advise  Saloni Lobato RN

## 2018-05-08 ENCOUNTER — TELEPHONE (OUTPATIENT)
Dept: FAMILY MEDICINE | Facility: CLINIC | Age: 58
End: 2018-05-08

## 2018-05-08 NOTE — TELEPHONE ENCOUNTER
Panel Management Review      Patient has the following on his problem list:     Hypertension   Last three blood pressure readings:  BP Readings from Last 3 Encounters:   03/16/18 142/80   12/22/17 130/82   11/10/16 138/84     Blood pressure: FAILED    HTN Guidelines:  Age 18-59 BP range:  Less than 140/90  Age 60-85 with Diabetes:  Less than 140/90  Age 60-85 without Diabetes:  less than 150/90      Composite cancer screening  Chart review shows that this patient is due/due soon for the following None  Summary:    Patient is due/failing the following:   BP CHECK and FOLLOW UP    Action needed:   Patient needs office visit for HTN f/u.    Type of outreach:    Sent letter.    Questions for provider review:    None                                                                                                                                    Giuseppe Chin MA     Chart routed to closed.

## 2018-05-08 NOTE — LETTER
Owen Sahni  [address] [address] [address]        May 8, 2018        Dear, Owen Sahni      Our records indicate that you have not scheduled for a(n)Hypertension  RN clinic and Blood pressure check  which was recommended by your health care team. Monitoring and managing your preventative and chronic health conditions are very important to us.       If you have received your health care elsewhere, please provide us with that information so it can be documented in your chart.    Please call [phone] or message us through your [employer] account to schedule an appointment or provide information for your chart.     We look forward to seeing you and working with you on your health care needs.     Sincerely,   SAMANTHA Almazan/NINA          *If you have already scheduled an appointment, please disregard this reminder

## 2018-05-11 DIAGNOSIS — I10 BENIGN ESSENTIAL HYPERTENSION: ICD-10-CM

## 2018-05-11 RX ORDER — HYDROCHLOROTHIAZIDE 12.5 MG/1
TABLET ORAL
Qty: 30 TABLET | Refills: 0 | Status: CANCELLED | OUTPATIENT
Start: 2018-05-11

## 2018-05-13 DIAGNOSIS — I10 BENIGN ESSENTIAL HYPERTENSION: ICD-10-CM

## 2018-05-14 RX ORDER — LOSARTAN POTASSIUM 50 MG/1
50 TABLET ORAL DAILY
Qty: 30 TABLET | Refills: 1 | OUTPATIENT
Start: 2018-05-14

## 2018-05-14 RX ORDER — LOSARTAN POTASSIUM 50 MG/1
TABLET ORAL
Qty: 30 TABLET | Refills: 1 | OUTPATIENT
Start: 2018-05-14

## 2018-05-14 RX ORDER — HYDROCHLOROTHIAZIDE 12.5 MG/1
TABLET ORAL
Qty: 30 TABLET | Refills: 0 | OUTPATIENT
Start: 2018-05-14

## 2018-05-17 ENCOUNTER — TELEPHONE (OUTPATIENT)
Dept: FAMILY MEDICINE | Facility: CLINIC | Age: 58
End: 2018-05-17

## 2018-05-17 DIAGNOSIS — I10 BENIGN ESSENTIAL HYPERTENSION: ICD-10-CM

## 2018-05-17 RX ORDER — LOSARTAN POTASSIUM 50 MG/1
TABLET ORAL
Qty: 30 TABLET | Refills: 1 | OUTPATIENT
Start: 2018-05-17

## 2018-05-17 RX ORDER — LOSARTAN POTASSIUM 50 MG/1
50 TABLET ORAL DAILY
Qty: 30 TABLET | Refills: 0 | Status: SHIPPED | OUTPATIENT
Start: 2018-05-17 | End: 2018-06-08

## 2018-05-17 NOTE — TELEPHONE ENCOUNTER
Patient is calling stated that he will be go tomorrow morning because he is trunk . Patient can't wait for tomorrow for his medication. Is there a way her can get a three day supply for losartan (COZAAR) 50 MG tablet.   Please call to discuss  Thank you

## 2018-05-17 NOTE — TELEPHONE ENCOUNTER
Called and discussed message with Tracie SINGH.  She advises ok for MA bp check and lab only appointment.  If scheduled for next week ok for 30 day refill.  Pt notified and appointment's scheduled.  Refill sent to pharmacy, pt notified.    Pt states he is over the road  and does not always know his schedule. Advised ok in future to send message to nurse who will help him find appointment.  Pt verbalized understanding.    To provider to cosign plan.  Saloni BUTLERN, RN

## 2018-05-17 NOTE — TELEPHONE ENCOUNTER
Pt has received both letters that we sent him.  He states he got a $300 bill after last ov and Tracie SINGH had said she would try to keep his costs down.  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 The new med seems to be working.  He only has one pill left and is leaving for out of town tomorrow night.  Pt advised last ov said kidney function needed to be checked also.  Advised I would check with Tracie SINGH tomorrow morning and see if we could do MA or pharmacy bp check and if we needed lab or if it could wait.  Advised pt I would call him back tomorrow morning with plan.    Advised pt it would have been more helpful if he had contacted us with these questions or concerns after one of the two letters we sent him so we could have had more time to address them.    Saloni Lobato BSN, RN

## 2018-05-17 NOTE — TELEPHONE ENCOUNTER
See previous denials.  I called and advised pharmacy we have now denied 4 times. Pharmacy will deny in there system now.  Saloni Lobato BSN, RN
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
## 2018-06-08 ENCOUNTER — OFFICE VISIT (OUTPATIENT)
Dept: FAMILY MEDICINE | Facility: CLINIC | Age: 58
End: 2018-06-08
Payer: COMMERCIAL

## 2018-06-08 VITALS
DIASTOLIC BLOOD PRESSURE: 76 MMHG | OXYGEN SATURATION: 97 % | WEIGHT: 305 LBS | BODY MASS INDEX: 42.54 KG/M2 | HEART RATE: 82 BPM | TEMPERATURE: 97.6 F | SYSTOLIC BLOOD PRESSURE: 122 MMHG

## 2018-06-08 DIAGNOSIS — I42.1 HYPERTROPHIC OBSTRUCTIVE CARDIOMYOPATHY (H): ICD-10-CM

## 2018-06-08 DIAGNOSIS — I10 ESSENTIAL HYPERTENSION WITH GOAL BLOOD PRESSURE LESS THAN 140/90: Primary | ICD-10-CM

## 2018-06-08 DIAGNOSIS — R07.9 EXERTIONAL CHEST PAIN: ICD-10-CM

## 2018-06-08 LAB
ANION GAP SERPL CALCULATED.3IONS-SCNC: 13 MMOL/L (ref 3–14)
BASOPHILS # BLD AUTO: 0 10E9/L (ref 0–0.2)
BASOPHILS NFR BLD AUTO: 0.4 %
BUN SERPL-MCNC: 24 MG/DL (ref 7–30)
CALCIUM SERPL-MCNC: 9.1 MG/DL (ref 8.5–10.1)
CHLORIDE SERPL-SCNC: 106 MMOL/L (ref 94–109)
CO2 SERPL-SCNC: 20 MMOL/L (ref 20–32)
CREAT SERPL-MCNC: 1.13 MG/DL (ref 0.66–1.25)
DIFFERENTIAL METHOD BLD: NORMAL
EOSINOPHIL # BLD AUTO: 0.2 10E9/L (ref 0–0.7)
EOSINOPHIL NFR BLD AUTO: 2.1 %
ERYTHROCYTE [DISTWIDTH] IN BLOOD BY AUTOMATED COUNT: 13.7 % (ref 10–15)
GFR SERPL CREATININE-BSD FRML MDRD: 66 ML/MIN/1.7M2
GLUCOSE SERPL-MCNC: 88 MG/DL (ref 70–99)
HCT VFR BLD AUTO: 43.4 % (ref 40–53)
HGB BLD-MCNC: 14.6 G/DL (ref 13.3–17.7)
LDLC SERPL DIRECT ASSAY-MCNC: 126 MG/DL
LYMPHOCYTES # BLD AUTO: 1.6 10E9/L (ref 0.8–5.3)
LYMPHOCYTES NFR BLD AUTO: 16.3 %
MCH RBC QN AUTO: 30.5 PG (ref 26.5–33)
MCHC RBC AUTO-ENTMCNC: 33.6 G/DL (ref 31.5–36.5)
MCV RBC AUTO: 91 FL (ref 78–100)
MONOCYTES # BLD AUTO: 1 10E9/L (ref 0–1.3)
MONOCYTES NFR BLD AUTO: 10.1 %
NEUTROPHILS # BLD AUTO: 6.7 10E9/L (ref 1.6–8.3)
NEUTROPHILS NFR BLD AUTO: 71.1 %
NT-PROBNP SERPL-MCNC: 831 PG/ML (ref 0–125)
PLATELET # BLD AUTO: 262 10E9/L (ref 150–450)
POTASSIUM SERPL-SCNC: 4.4 MMOL/L (ref 3.4–5.3)
RBC # BLD AUTO: 4.79 10E12/L (ref 4.4–5.9)
SODIUM SERPL-SCNC: 139 MMOL/L (ref 133–144)
TSH SERPL DL<=0.005 MIU/L-ACNC: 1.28 MU/L (ref 0.4–4)
WBC # BLD AUTO: 9.5 10E9/L (ref 4–11)

## 2018-06-08 PROCEDURE — 80048 BASIC METABOLIC PNL TOTAL CA: CPT | Performed by: FAMILY MEDICINE

## 2018-06-08 PROCEDURE — 84443 ASSAY THYROID STIM HORMONE: CPT | Performed by: FAMILY MEDICINE

## 2018-06-08 PROCEDURE — 36415 COLL VENOUS BLD VENIPUNCTURE: CPT | Performed by: FAMILY MEDICINE

## 2018-06-08 PROCEDURE — 85025 COMPLETE CBC W/AUTO DIFF WBC: CPT | Performed by: FAMILY MEDICINE

## 2018-06-08 PROCEDURE — 83880 ASSAY OF NATRIURETIC PEPTIDE: CPT | Performed by: FAMILY MEDICINE

## 2018-06-08 PROCEDURE — 83721 ASSAY OF BLOOD LIPOPROTEIN: CPT | Performed by: FAMILY MEDICINE

## 2018-06-08 PROCEDURE — 99215 OFFICE O/P EST HI 40 MIN: CPT | Performed by: FAMILY MEDICINE

## 2018-06-08 RX ORDER — LOSARTAN POTASSIUM AND HYDROCHLOROTHIAZIDE 12.5; 5 MG/1; MG/1
1 TABLET ORAL DAILY
Qty: 90 TABLET | Refills: 1 | Status: SHIPPED | OUTPATIENT
Start: 2018-06-08 | End: 2018-12-02

## 2018-06-08 RX ORDER — METOPROLOL SUCCINATE 25 MG/1
25 TABLET, EXTENDED RELEASE ORAL DAILY
Qty: 90 TABLET | Refills: 1 | Status: SHIPPED | OUTPATIENT
Start: 2018-06-08 | End: 2018-12-02

## 2018-06-08 NOTE — NURSING NOTE
"Chief Complaint   Patient presents with     Hypertension       Initial /83 (Cuff Size: Adult Large)  Pulse 82  Temp 97.6  F (36.4  C) (Oral)  Wt 305 lb (138.3 kg)  SpO2 97%  BMI 42.54 kg/m2 Estimated body mass index is 42.54 kg/(m^2) as calculated from the following:    Height as of 3/16/18: 5' 11\" (1.803 m).    Weight as of this encounter: 305 lb (138.3 kg).      Liliana Lowe LPN    "

## 2018-06-08 NOTE — PATIENT INSTRUCTIONS
1. Stop the Amlodipine.    2. Start Metoprolol 25 mg daily.    3. Let's combine the Losartan and HCTZ 50/12.56 once per day.    4. Set up cardiology appointment - [phone].    5. I will contact you about your test results along with further instructions.

## 2018-06-08 NOTE — MR AVS SNAPSHOT
After Visit Summary   6/8/2018    Owen Sahni    MRN: [id number]           Patient Information     Date Of Birth          1960        Visit Information        Provider Department      6/8/2018 1:50 PM Braulio Coronel MD [facility]        Today's Diagnoses     Essential hypertension with goal blood pressure less than 140/90    -  1    Hypertrophic obstructive cardiomyopathy (H)        Exertional chest pain          Care Instructions    1. Stop the Amlodipine.    2. Start Metoprolol 25 mg daily.    3. Let's combine the Losartan and HCTZ 50/12.56 once per day.    4. Set up cardiology appointment - [phone].    5. I will contact you about your test results along with further instructions.          Follow-ups after your visit        Additional Services     CARDIOLOGY EVAL ADULT REFERRAL       Preferred location:  [facility] [phone]   [email or web address]    Please be aware that coverage of these services is subject to the terms and limitations of your health insurance plan.  Call member services at your health plan with any benefit or coverage questions.      Please bring the following to your appointment:  Any x-rays, CTs or MRIs which have been performed. Contact the facility where they were done to arrange for  prior to your scheduled appointment.    List of current medications  This referral request   Any documents/labs given to you for this referral                  Who to contact     If you have questions or need follow up information about today's clinic visit or your schedule please contact [facility] directly at [phone].  Normal or non-critical lab and imaging results will be communicated to you by MyChart, letter or phone within 4 business days after the clinic has received the results. If you do not hear from us within 7 days, please contact the clinic through [employer] or  phone. If you have a critical or abnormal lab result, we will notify you by phone as soon as possible.  Submit refill requests through [employer] or call your pharmacy and they will forward the refill request to us. Please allow 3 business days for your refill to be completed.          Additional Information About Your Visit        Care EveryWhere ID     This is your Care EveryWhere ID. This could be used by other organizations to access your [place] medical records  [id number]        Your Vitals Were     Pulse Temperature Pulse Oximetry BMI (Body Mass Index)          82 97.6  F (36.4  C) (Oral) 97% 42.54 kg/m2         Blood Pressure from Last 3 Encounters:   06/08/18 122/76   03/16/18 142/80   12/22/17 130/82    Weight from Last 3 Encounters:   06/08/18 305 lb (138.3 kg)   03/16/18 (!) 305 lb (138.3 kg)   12/22/17 288 lb (130.6 kg)              We Performed the Following     Basic metabolic panel     BNP-N terminal pro     CARDIOLOGY EVAL ADULT REFERRAL     CBC with platelets differential     LDL cholesterol direct     TSH with free T4 reflex          Today's Medication Changes          These changes are accurate as of 6/8/18  3:00 PM.  If you have any questions, ask your nurse or doctor.               Start taking these medicines.        Dose/Directions    losartan-hydrochlorothiazide 50-12.5 MG per tablet   Commonly known as:  HYZAAR   Used for:  Essential hypertension with goal blood pressure less than 140/90, Hypertrophic obstructive cardiomyopathy (H)   Started by:  Braulio Coronel MD        Dose:  1 tablet   Take 1 tablet by mouth daily   Quantity:  90 tablet   Refills:  1       metoprolol succinate 25 MG 24 hr tablet   Commonly known as:  TOPROL-XL   Used for:  Essential hypertension with goal blood pressure less than 140/90, Hypertrophic obstructive cardiomyopathy (H)   Started by:  Braulio Coroenl MD        Dose:  25 mg   Take 1 tablet (25 mg) by mouth daily   Quantity:  90 tablet   Refills:  1          Stop taking these medicines if you haven't already. Please contact your care team if you have questions.     amLODIPine 5 MG tablet   Commonly known as:  NORVASC   Stopped by:  Braulio Coronel MD           hydrochlorothiazide 12.5 MG Tabs tablet   Stopped by:  Braulio Coronel MD           losartan 50 MG tablet   Commonly known as:  COZAAR   Stopped by:  Braulio Coronel MD                Where to get your medicines      These medications were sent to [facility]/pharmacy #[id number] - [place], [place] - [address],  [address] [facility]  [address], , [facility] [address]     Phone:  [phone]     losartan-hydrochlorothiazide 50-12.5 MG per tablet    metoprolol succinate 25 MG 24 hr tablet                Primary Care Provider Office Phone # Fax #    Braulio Coronel [phone] [address] [facility] [address]        Equal Access to Services     ISHAN BONILLA AH: Hadii aad ku hadasho Soomaali, waaxda luqadaha, qaybta kaalmada adeegyada, waxay idiin hayaan laury oviedo . So [facility] [phone].    ATENCIÓN: Si habla español, tiene a levy disposición servicios gratuitos de asistencia lingüística. Llame al [phone].    We comply with applicable federal civil rights laws and [place] laws. We do not discriminate on the basis of race, color, national origin, age, disability, sex, sexual orientation, or gender identity.            Thank you!     Thank you for choosing [facility] [place]  for your care. Our goal is always to provide you with excellent care. Hearing back from our patients is one way we can continue to improve our services. Please take a few minutes to complete the written survey that you may receive in the mail after your visit with us. Thank you!             Your Updated Medication List - Protect others around you: Learn how to safely use, store and throw away your medicines at www.disposemymeds.org.          This list is accurate as of  6/8/18  3:00 PM.  Always use your most recent med list.                   Brand Name Dispense Instructions for use Diagnosis    losartan-hydrochlorothiazide 50-12.5 MG per tablet    HYZAAR    90 tablet    Take 1 tablet by mouth daily    Essential hypertension with goal blood pressure less than 140/90, Hypertrophic obstructive cardiomyopathy (H)       metoprolol succinate 25 MG 24 hr tablet    TOPROL-XL    90 tablet    Take 1 tablet (25 mg) by mouth daily    Essential hypertension with goal blood pressure less than 140/90, Hypertrophic obstructive cardiomyopathy (H)       Multi-vitamin Tabs tablet      Take 1 tablet by mouth daily.        SB LOW DOSE ASA EC 81 MG EC tablet   Generic drug:  aspirin      Take 81 mg by mouth daily

## 2018-06-12 ENCOUNTER — TELEPHONE (OUTPATIENT)
Dept: FAMILY MEDICINE | Facility: CLINIC | Age: 58
End: 2018-06-12

## 2018-06-12 NOTE — TELEPHONE ENCOUNTER
RN to please explain CHF to patient.    Please let him the cardiologist is in a better position to discuss this issue further.    Braulio Coronel M.D.

## 2018-06-12 NOTE — TELEPHONE ENCOUNTER
Discussed the result of N Terminal ProBNP as patient's heart is having to work harder causing congested heart failure. Patient to schedule appointment with cardiologist to discuss this further.  Patient verbalized understanding and will schedule appointment when he gets home today    Anjelica HALEY, RN, CPN

## 2018-06-12 NOTE — TELEPHONE ENCOUNTER
"Patient saw a cardiologist a few years ago and was told couldn't do anything with him at that time  Patient will schedule a follow up with the cardiologist at this time  Patient would like further explanation of result of \"heart congestion\", what does this mean? Patient has had a heart murmur since he was 3 y.o he's aware of.    Patient informed of result note as below and to follow up with Dr. Coronel in 6 months.  Patient verbalized understanding    Anjelica BUTLERN, RN, CPN    "

## 2018-06-12 NOTE — TELEPHONE ENCOUNTER
Please call patient:     1. I wanted you to see cardiology but I did not see upcoming appointment - please set that up.     2. Your test showed signs of heart congestion.     3. Your cholesterol was a bit high but not worrisome at this time.     4. I would like to see you back in 6 months for follow-up.    Braulio Coronel M.D.

## 2018-06-12 NOTE — TELEPHONE ENCOUNTER
"Patient will schedule with cardiologist.    To provider to clarify what \"heart congestion\" means for test result.    Anjelica BUTLERN, RN, CPN    "

## 2018-08-03 ENCOUNTER — TELEPHONE (OUTPATIENT)
Dept: FAMILY MEDICINE | Facility: CLINIC | Age: 58
End: 2018-08-03

## 2018-08-03 NOTE — TELEPHONE ENCOUNTER
Patient states he picked up amlodipine at [facility] today and he thought you took him off of this.  Please call.    Thank you.

## 2018-08-03 NOTE — TELEPHONE ENCOUNTER
Pt notified amlodipine had been stopped by Dr. Braulio Coronel.  He returned med.  Saloni Lobato BSN, RN

## 2018-12-02 DIAGNOSIS — I10 ESSENTIAL HYPERTENSION WITH GOAL BLOOD PRESSURE LESS THAN 140/90: ICD-10-CM

## 2018-12-02 DIAGNOSIS — I42.1 HYPERTROPHIC OBSTRUCTIVE CARDIOMYOPATHY (H): ICD-10-CM

## 2018-12-03 RX ORDER — METOPROLOL SUCCINATE 25 MG/1
TABLET, EXTENDED RELEASE ORAL
Qty: 90 TABLET | Refills: 1 | Status: SHIPPED | OUTPATIENT
Start: 2018-12-03 | End: 2019-05-23

## 2018-12-03 RX ORDER — LOSARTAN POTASSIUM AND HYDROCHLOROTHIAZIDE 12.5; 5 MG/1; MG/1
TABLET ORAL
Qty: 90 TABLET | Refills: 1 | Status: SHIPPED | OUTPATIENT
Start: 2018-12-03 | End: 2019-05-23

## 2019-05-23 DIAGNOSIS — I42.1 HYPERTROPHIC OBSTRUCTIVE CARDIOMYOPATHY (H): ICD-10-CM

## 2019-05-23 DIAGNOSIS — I10 ESSENTIAL HYPERTENSION WITH GOAL BLOOD PRESSURE LESS THAN 140/90: ICD-10-CM

## 2019-05-23 RX ORDER — LOSARTAN POTASSIUM AND HYDROCHLOROTHIAZIDE 12.5; 5 MG/1; MG/1
TABLET ORAL
Qty: 30 TABLET | Refills: 0 | Status: SHIPPED | OUTPATIENT
Start: 2019-05-23 | End: 2019-06-03

## 2019-05-23 RX ORDER — METOPROLOL SUCCINATE 25 MG/1
TABLET, EXTENDED RELEASE ORAL
Qty: 30 TABLET | Refills: 0 | Status: SHIPPED | OUTPATIENT
Start: 2019-05-23 | End: 2019-06-03

## 2019-05-23 NOTE — TELEPHONE ENCOUNTER
Refilled # 30 and patient needs to be seen as last OV was 6/8/18.  Lucy JUNE - Please send letter to pt needs to been seen for further refills  Thank you

## 2019-05-23 NOTE — LETTER
May 23, 2019    Owen Sahni  [address] [address]    Dear Owen,       We recently received a refill request for losartan-hydrochlorothiazide and metoprolol.  We have refilled this for a one time 30 day supply only because you are due for a:    Blood pressure and COPD office visit and fasting lab appointment      Please schedule this lab appointment 4-5 days prior to the office visit.     Please call at your earliest convenience so that there will not be a delay with your future refills.          Thank you,   Your [facility] Team/  [phone]

## 2019-05-28 NOTE — PROGRESS NOTES
"Subjective     Owen Sahni is a 58 year old male who presents to clinic today for the following health issues:    HPI   Hypertension Follow-up      Do you check your blood pressure regularly outside of the clinic? No     Are you following a low salt diet? No    Are your blood pressures ever more than 140 on the top number (systolic) OR more   than 90 on the bottom number (diastolic), for example 140/90? Yes    Amount of exercise or physical activity: None    Problems taking medications regularly: No    Medication side effects: none    Diet: regular (no restrictions)      sob, productive cough x 6 months. Sputum off and on. Can be whitish in color. No blood or green or yellow. Patient is morbidly obese bmi 43. He has gained 20 pounds in the past year per patient but \"eats the same\". Used to drink a lot of alcohol per patient but in the past year only drinks 3 beers/week and no other alcohol per patient.   H/o hypertrophic cardiomyopathy. Cardiology started on him beta blocker.       Last echo was 2015 for hypertrophic cardiomyopathy.     No h.o asthma or allergies per patient.   No fevers or chills.   Not much leg edema.     No pnd or orthopnea.     Blood pressure has been only borderline to goal. No side effects from medications per patient. Nonsmoker. Oxygen is 98 percent today.             Patient Active Problem List   Diagnosis     Sleep apnea     Lumbosacral radiculopathy     CARDIOVASCULAR SCREENING; LDL GOAL LESS THAN 130     Abnormal EKG     SOB (shortness of breath)     Seafood allergy     AK (actinic keratosis)     Myalgia     Advanced directives, counseling/discussion     Essential hypertension with goal blood pressure less than 140/90     Benign essential hypertension     Hypertrophic obstructive cardiomyopathy (H)     Systolic murmur     Past Surgical History:   Procedure Laterality Date     COLONOSCOPY  4/12/2012    Procedure:COLONOSCOPY; Colonoscopy, screening; Surgeon:ARANZA TEE; Location: " OR       Social History     Tobacco Use     Smoking status: Never Smoker     Smokeless tobacco: Never Used     Tobacco comment: non-smoking household   Substance Use Topics     Alcohol use: Not Currently     Comment: 6 beers weekly     Family History   Problem Relation Age of Onset     Cancer Mother         breast     Cancer Father         Multiple myeloma     Hypertension Father      Arrhythmia No family hx of      Myocardial Infarction No family hx of      Diabetes No family hx of      Coronary Artery Disease No family hx of      Cerebrovascular Disease No family hx of          Current Outpatient Medications   Medication Sig Dispense Refill     losartan-hydrochlorothiazide (HYZAAR) 50-12.5 MG tablet Take 1 tablet by mouth daily 30 tablet 5     metoprolol succinate ER (TOPROL-XL) 25 MG 24 hr tablet Take 1 tablet (25 mg) by mouth daily 30 tablet 5     aspirin (SB LOW DOSE ASA EC) 81 MG EC tablet Take 81 mg by mouth daily       multivitamin, therapeutic with minerals (MULTI-VITAMIN) TABS Take 1 tablet by mouth daily.       No Known Allergies    Reviewed and updated as needed this visit by Provider         Review of Systems   ROS COMP: Constitutional, HEENT, cardiovascular, pulmonary, GI, , musculoskeletal, neuro, skin, endocrine and psych systems are negative, except as otherwise noted.      Objective    /88   Pulse 80   Temp 98.6  F (37  C) (Oral)   Resp 16   Ht 1.829 m (6')   Wt 146.1 kg (322 lb)   SpO2 98%   BMI 43.67 kg/m    Body mass index is 43.67 kg/m .  Physical Exam   GENERAL: alert, no distress and obese  NECK: no adenopathy, no asymmetry, masses, or scars and thyroid normal to palpation  RESP: lungs clear to auscultation - no rales, rhonchi or wheezes  CV: regular rate and rhythm, normal S1 S2, no S3 or S4, no murmur, click or rub, no peripheral edema and peripheral pulses strong  MS: no gross musculoskeletal defects noted, no edema  NEURO: Normal strength and tone, mentation intact and  speech normal  PSYCH: mentation appears normal, affect normal/bright    Xray-negative for lung pathology or enlarged heart            Assessment & Plan     1. Hypertrophic obstructive cardiomyopathy (H)    Negative chest xray will order echo and have f/u with cardiology    - losartan-hydrochlorothiazide (HYZAAR) 50-12.5 MG tablet; Take 1 tablet by mouth daily  Dispense: 30 tablet; Refill: 5  - metoprolol succinate ER (TOPROL-XL) 25 MG 24 hr tablet; Take 1 tablet (25 mg) by mouth daily  Dispense: 30 tablet; Refill: 5    2. Essential hypertension with goal blood pressure less than 140/90    - Lipid panel reflex to direct LDL Fasting  - Basic metabolic panel  - losartan-hydrochlorothiazide (HYZAAR) 50-12.5 MG tablet; Take 1 tablet by mouth daily  Dispense: 30 tablet; Refill: 5  - metoprolol succinate ER (TOPROL-XL) 25 MG 24 hr tablet; Take 1 tablet (25 mg) by mouth daily  Dispense: 30 tablet; Refill: 5  - XR Chest 2 Views; Future         Return in about 1 month (around 7/1/2019) for if not improving.    Tracie Fishman PA-C  [facility]

## 2019-05-31 DIAGNOSIS — I10 ESSENTIAL HYPERTENSION WITH GOAL BLOOD PRESSURE LESS THAN 140/90: ICD-10-CM

## 2019-05-31 DIAGNOSIS — I42.1 HYPERTROPHIC OBSTRUCTIVE CARDIOMYOPATHY (H): ICD-10-CM

## 2019-06-03 ENCOUNTER — TELEPHONE (OUTPATIENT)
Dept: FAMILY MEDICINE | Facility: CLINIC | Age: 59
End: 2019-06-03

## 2019-06-03 ENCOUNTER — OFFICE VISIT (OUTPATIENT)
Dept: FAMILY MEDICINE | Facility: CLINIC | Age: 59
End: 2019-06-03
Payer: COMMERCIAL

## 2019-06-03 ENCOUNTER — ANCILLARY PROCEDURE (OUTPATIENT)
Dept: GENERAL RADIOLOGY | Facility: CLINIC | Age: 59
End: 2019-06-03
Attending: PHYSICIAN ASSISTANT
Payer: COMMERCIAL

## 2019-06-03 VITALS
RESPIRATION RATE: 16 BRPM | BODY MASS INDEX: 42.66 KG/M2 | WEIGHT: 315 LBS | HEART RATE: 80 BPM | SYSTOLIC BLOOD PRESSURE: 136 MMHG | DIASTOLIC BLOOD PRESSURE: 88 MMHG | OXYGEN SATURATION: 98 % | TEMPERATURE: 98.6 F | HEIGHT: 72 IN

## 2019-06-03 DIAGNOSIS — I10 ESSENTIAL HYPERTENSION WITH GOAL BLOOD PRESSURE LESS THAN 140/90: Primary | ICD-10-CM

## 2019-06-03 DIAGNOSIS — I10 ESSENTIAL HYPERTENSION WITH GOAL BLOOD PRESSURE LESS THAN 140/90: ICD-10-CM

## 2019-06-03 DIAGNOSIS — R06.02 SOB (SHORTNESS OF BREATH): ICD-10-CM

## 2019-06-03 DIAGNOSIS — I42.1 HYPERTROPHIC OBSTRUCTIVE CARDIOMYOPATHY (H): ICD-10-CM

## 2019-06-03 LAB
ANION GAP SERPL CALCULATED.3IONS-SCNC: 6 MMOL/L (ref 3–14)
BUN SERPL-MCNC: 20 MG/DL (ref 7–30)
CALCIUM SERPL-MCNC: 8.9 MG/DL (ref 8.5–10.1)
CHLORIDE SERPL-SCNC: 105 MMOL/L (ref 94–109)
CHOLEST SERPL-MCNC: 191 MG/DL
CO2 SERPL-SCNC: 29 MMOL/L (ref 20–32)
CREAT SERPL-MCNC: 1.04 MG/DL (ref 0.66–1.25)
GFR SERPL CREATININE-BSD FRML MDRD: 78 ML/MIN/{1.73_M2}
GLUCOSE SERPL-MCNC: 109 MG/DL (ref 70–99)
HDLC SERPL-MCNC: 42 MG/DL
LDLC SERPL CALC-MCNC: 118 MG/DL
NONHDLC SERPL-MCNC: 149 MG/DL
POTASSIUM SERPL-SCNC: 4.7 MMOL/L (ref 3.4–5.3)
SODIUM SERPL-SCNC: 140 MMOL/L (ref 133–144)
TRIGL SERPL-MCNC: 154 MG/DL

## 2019-06-03 PROCEDURE — 80061 LIPID PANEL: CPT | Performed by: PHYSICIAN ASSISTANT

## 2019-06-03 PROCEDURE — 80048 BASIC METABOLIC PNL TOTAL CA: CPT | Performed by: PHYSICIAN ASSISTANT

## 2019-06-03 PROCEDURE — 71046 X-RAY EXAM CHEST 2 VIEWS: CPT

## 2019-06-03 PROCEDURE — 99214 OFFICE O/P EST MOD 30 MIN: CPT | Performed by: PHYSICIAN ASSISTANT

## 2019-06-03 PROCEDURE — 36415 COLL VENOUS BLD VENIPUNCTURE: CPT | Performed by: PHYSICIAN ASSISTANT

## 2019-06-03 RX ORDER — METOPROLOL SUCCINATE 25 MG/1
25 TABLET, EXTENDED RELEASE ORAL DAILY
Qty: 30 TABLET | Refills: 5 | Status: SHIPPED | OUTPATIENT
Start: 2019-06-03 | End: 2019-07-10

## 2019-06-03 RX ORDER — LOSARTAN POTASSIUM AND HYDROCHLOROTHIAZIDE 12.5; 5 MG/1; MG/1
TABLET ORAL
Qty: 90 TABLET | Refills: 1 | OUTPATIENT
Start: 2019-06-03

## 2019-06-03 RX ORDER — METOPROLOL SUCCINATE 25 MG/1
TABLET, EXTENDED RELEASE ORAL
Qty: 90 TABLET | Refills: 1 | OUTPATIENT
Start: 2019-06-03

## 2019-06-03 RX ORDER — LOSARTAN POTASSIUM AND HYDROCHLOROTHIAZIDE 12.5; 5 MG/1; MG/1
1 TABLET ORAL DAILY
Qty: 30 TABLET | Refills: 5 | Status: SHIPPED | OUTPATIENT
Start: 2019-06-03 | End: 2019-11-18

## 2019-06-03 ASSESSMENT — MIFFLIN-ST. JEOR: SCORE: 2313.58

## 2019-06-03 NOTE — TELEPHONE ENCOUNTER
PLEASE CALL PATIENT: Dear Owen,       It was a pleasure to see you at your recent office visit.  Your test results are listed below.  XRAY is negative no lung disease noted or pneumonia. The next step for you shortness of breath and cough would be to repeat you echo that you had 4 years ago now and then have you f/u with cardiology as your lungs look clear. Please schedule this asap. If things worsen or change let me know sooner.         If you have any questions or concerns, please call the clinic at [phone].     Sincerely,   Tracie Fishman PA-C

## 2019-06-03 NOTE — LETTER
June 4, 2019    Owen Sahni  [id number] [facility] [id number]        Dear Owen,     It was a pleasure to see you at your recent office visit.  Your test results are listed below.  Blood sugar is a little bit elevated.  Kidney function normal.  Cholesterol has improved some.        If you have any questions or concerns, please call the clinic at [phone].    Sincerely,  Tracie Fishman PA-C    Results for orders placed or performed in visit on 06/03/19   Lipid panel reflex to direct LDL Fasting   Result Value Ref Range    Cholesterol 191 <200 mg/dL    Triglycerides 154 (H) <150 mg/dL    HDL Cholesterol 42 >39 mg/dL    LDL Cholesterol Calculated 118 (H) <100 mg/dL    Non HDL Cholesterol 149 (H) <130 mg/dL   Basic metabolic panel   Result Value Ref Range    Sodium 140 133 - 144 mmol/L    Potassium 4.7 3.4 - 5.3 mmol/L    Chloride 105 94 - 109 mmol/L    Carbon Dioxide 29 20 - 32 mmol/L    Anion Gap 6 3 - 14 mmol/L    Glucose 109 (H) 70 - 99 mg/dL    Urea Nitrogen 20 7 - 30 mg/dL    Creatinine 1.04 0.66 - 1.25 mg/dL    GFR Estimate 78 >60 mL/min/[1.73_m2]    GFR Estimate If Black >90 >60 mL/min/[1.73_m2]    Calcium 8.9 8.5 - 10.1 mg/dL

## 2019-06-03 NOTE — TELEPHONE ENCOUNTER
Pt notified of provider message as written.  Pt verbalized good understanding.  Scheduling numbers for cardiology and echo given to pt.  Saloni BUTLERN, RN

## 2019-06-03 NOTE — RESULT ENCOUNTER NOTE
PLEASE CALL PATIENT: Dear Owen,      It was a pleasure to see you at your recent office visit.  Your test results are listed below.  XRAY is negative no lung disease noted or pneumonia. The next step for you shortness of breath and cough would be to repeat you echo that you had 4 years ago now and then have you f/u with cardiology as your lungs look clear. Please schedule this asap. If things worsen or change let me know sooner.         If you have any questions or concerns, please call the clinic at [phone].    Sincerely,  Tracie Fishman PA-C

## 2019-06-03 NOTE — TELEPHONE ENCOUNTER
Left message on answering machine for patient to call back to [phone].  Saloni Lobato BSN, RN

## 2019-06-04 NOTE — RESULT ENCOUNTER NOTE
Dear Owen,      It was a pleasure to see you at your recent office visit.  Your test results are listed below.  Blood sugar is a little bit elevated.  Kidney function normal.  Cholesterol has improved some.        If you have any questions or concerns, please call the clinic at [phone].    Sincerely,  Tracie Fishman PA-C

## 2019-07-02 ENCOUNTER — ANCILLARY PROCEDURE (OUTPATIENT)
Dept: CARDIOLOGY | Facility: CLINIC | Age: 59
End: 2019-07-02
Attending: PHYSICIAN ASSISTANT
Payer: COMMERCIAL

## 2019-07-02 DIAGNOSIS — R06.02 SOB (SHORTNESS OF BREATH): ICD-10-CM

## 2019-07-02 DIAGNOSIS — I42.1 HYPERTROPHIC OBSTRUCTIVE CARDIOMYOPATHY (H): ICD-10-CM

## 2019-07-02 PROCEDURE — 93306 TTE W/DOPPLER COMPLETE: CPT | Performed by: INTERNAL MEDICINE

## 2019-07-02 RX ADMIN — Medication 7 ML: at 11:30

## 2019-07-03 ENCOUNTER — TELEPHONE (OUTPATIENT)
Dept: FAMILY MEDICINE | Facility: CLINIC | Age: 59
End: 2019-07-03

## 2019-07-03 NOTE — TELEPHONE ENCOUNTER
Attempted to reach pt. There was a poor connection and call was disconnected before relaying provider message.    Attempted to reach pt again. There was no answer. LM to return call to RN at [phone].    Kaylin Ghosh, CARRIEN, RN     Discussed the importance of blood sugar control in the prevention of ocular complications.

## 2019-07-03 NOTE — TELEPHONE ENCOUNTER
Pt notified via phone of results and plan as written in provider result note below.  Pt does not have a Cardiology appointment yet and states he will call to get an appointment with next available provider next week.  Stressed importance of evaluation in the ED if he is experiencing any SOB with this dx of atrial fibrillation.  Pt confirmed that he takes metoprolol 25 mg daily and agrees he will double the dosage of metoprolol until seen by Cardiology.    Pt indicates understanding of these issues and agrees with the plan, except states that he has had this same SOB for 8-9 months and doesn't feel ED evaluation is necessary with his current sx. RN stressed importance of ED evaluation now based on Echo cardiogram findings if pt is experiencing SOB as documented in provider note below. Pt verbalized understanding of results and risks of untreated A fib.    SUDHA Gonzalez, RN

## 2019-07-03 NOTE — RESULT ENCOUNTER NOTE
PLEASE CALL PATIENT: Dear Owen,      It was a pleasure to see you at your recent office visit.  Your test results are listed below.  Echo came back showing hypertrophic cardiomyopathy which we already knew you had, however they did note that you were in a fib which is a rhythm problem at the time of the exam, which can cause shortness of breath.  The more worrisome thing about that is that it can cause stroke/blood clots.  Do you have an appointment with cardiology? I can't tell if it this is a cancelled appointment or not.  If you are actively shortness of breath today you will need to go to the emergency room for treatment of your a fib. Otherwise you can wait to see cardiology in the next week, you need to see them soon.  We can increase your blood pressure medication to a higher dose, are you currently taking 25 mg daily  Of metorpolol? If so you should double this until you see cardiology.         If you have any questions or concerns, please call the clinic at [phone].    Sincerely,  Tracie Fishman PA-C

## 2019-07-03 NOTE — TELEPHONE ENCOUNTER
----- Message from Tracie Fishman PA-C sent at 7/3/2019  4:40 PM CDT -----  PLEASE CALL PATIENT: Dear Owen,      It was a pleasure to see you at your recent office visit.  Your test results are listed below.  Echo came back showing hypertrophic cardiomyopathy which we already knew you had, however they did note that you were in a fib which is a rhythm problem at the time of the exam, which can cause shortness of breath.  The more worrisome thing about that is that it can cause stroke/blood clots.  Do you have an appointment with cardiology? I can't tell if it this is a cancelled appointment or not.  If you are actively shortness of breath today you will need to go to the emergency room for treatment of your a fib. Otherwise you can wait to see cardiology in the next week, you need to see them soon.  We can increase your blood pressure medication to a higher dose, are you currently taking 25 mg daily  Of metorpolol? If so you should double this until you see cardiology.         If you have any questions or concerns, please call the clinic at [phone].    Sincerely,  Tracie Fishman PA-C

## 2019-07-10 ENCOUNTER — TRANSFERRED RECORDS (OUTPATIENT)
Dept: HEALTH INFORMATION MANAGEMENT | Facility: CLINIC | Age: 59
End: 2019-07-10

## 2019-07-10 ENCOUNTER — OFFICE VISIT (OUTPATIENT)
Dept: CARDIOLOGY | Facility: CLINIC | Age: 59
End: 2019-07-10
Payer: COMMERCIAL

## 2019-07-10 VITALS
WEIGHT: 315 LBS | BODY MASS INDEX: 43.4 KG/M2 | HEART RATE: 73 BPM | OXYGEN SATURATION: 97 % | SYSTOLIC BLOOD PRESSURE: 160 MMHG | DIASTOLIC BLOOD PRESSURE: 94 MMHG

## 2019-07-10 DIAGNOSIS — E66.01 MORBID OBESITY (H): ICD-10-CM

## 2019-07-10 DIAGNOSIS — I10 ESSENTIAL HYPERTENSION WITH GOAL BLOOD PRESSURE LESS THAN 140/90: ICD-10-CM

## 2019-07-10 DIAGNOSIS — I42.1 HYPERTROPHIC OBSTRUCTIVE CARDIOMYOPATHY (H): ICD-10-CM

## 2019-07-10 DIAGNOSIS — I48.0 PAF (PAROXYSMAL ATRIAL FIBRILLATION) (H): Primary | ICD-10-CM

## 2019-07-10 DIAGNOSIS — R00.2 PALPITATIONS: ICD-10-CM

## 2019-07-10 LAB
ALBUMIN SERPL-MCNC: 3.9 G/DL (ref 3.4–5)
ALP SERPL-CCNC: 81 U/L (ref 40–150)
ALT SERPL W P-5'-P-CCNC: 57 U/L (ref 0–70)
AST SERPL W P-5'-P-CCNC: 32 U/L (ref 0–45)
BILIRUB DIRECT SERPL-MCNC: 0.1 MG/DL (ref 0–0.2)
BILIRUB SERPL-MCNC: 0.4 MG/DL (ref 0.2–1.3)
ERYTHROCYTE [DISTWIDTH] IN BLOOD BY AUTOMATED COUNT: 13.5 % (ref 10–15)
HCT VFR BLD AUTO: 44.6 % (ref 40–53)
HGB BLD-MCNC: 14.6 G/DL (ref 13.3–17.7)
INR PPP: 0.93 (ref 0.86–1.14)
MCH RBC QN AUTO: 30.3 PG (ref 26.5–33)
MCHC RBC AUTO-ENTMCNC: 32.7 G/DL (ref 31.5–36.5)
MCV RBC AUTO: 93 FL (ref 78–100)
PLATELET # BLD AUTO: 289 10E9/L (ref 150–450)
PROT SERPL-MCNC: 7.7 G/DL (ref 6.8–8.8)
RBC # BLD AUTO: 4.82 10E12/L (ref 4.4–5.9)
WBC # BLD AUTO: 8.2 10E9/L (ref 4–11)

## 2019-07-10 PROCEDURE — 93000 ELECTROCARDIOGRAM COMPLETE: CPT | Performed by: INTERNAL MEDICINE

## 2019-07-10 PROCEDURE — 99204 OFFICE O/P NEW MOD 45 MIN: CPT | Performed by: INTERNAL MEDICINE

## 2019-07-10 RX ORDER — METOPROLOL SUCCINATE 50 MG/1
50 TABLET, EXTENDED RELEASE ORAL DAILY
Qty: 90 TABLET | Refills: 3 | Status: SHIPPED | OUTPATIENT
Start: 2019-07-10 | End: 2020-07-06

## 2019-07-10 RX ORDER — WARFARIN SODIUM 5 MG/1
5 TABLET ORAL DAILY
Qty: 90 TABLET | Refills: 3 | Status: SHIPPED | OUTPATIENT
Start: 2019-07-10 | End: 2019-08-20

## 2019-07-10 NOTE — NURSING NOTE
Chief Complaint   Patient presents with     Atrial Fib     Dr Oliver; new pt (previously seen by Dr Adamson 2015)- hypertrophic cardiomyopathy, HTN. Is . New onset atrial fibrillation (noted during echo on 7/2/19). Pt reports SOB on exertion.       Initial BP (!) 160/94 (BP Location: Right arm, Patient Position: Chair, Cuff Size: Adult Large)   Pulse 73   Wt 145.2 kg (320 lb)   SpO2 97%   BMI 43.40 kg/m   Estimated body mass index is 43.4 kg/m  as calculated from the following:    Height as of 6/3/19: 1.829 m (6').    Weight as of this encounter: 145.2 kg (320 lb)..  BP completed using cuff size: large    EKG was done.  Marycarmen Gomez MA

## 2019-07-10 NOTE — PROGRESS NOTES
Referring provider: Tracie Fishman PA-C    Chief complaint: None    HPI: Mr. Owen Sahni is a 59 year old  male with PMH significant for nonobstructive hypertrophic cardiomyopathy, morbid obesity and hypertension.    The patient is being seen today for atrial fibrillation detected during acquisition of images for transthoracic echocardiogram.  The patient has history of hypertrophic cardiomyopathy and he was recommended echocardiogram to recheck on his HCM by his PCP.  The patient does not report any symptoms during the echo study.      The patient does not have prior history of atrial fibrillation.  I reviewed EKG in clinic today which shows sinus rhythm with T wave inversion in V3 to V6.  No prior history of stroke.  Blood pressure is well controlled with losartan hydrochlorothiazide and metoprolol 25 mg.  The patient's metoprolol was increased to 50 mg daily since the detection of paroxysmal A. Fib.    The patient is a  and he is morbidly obese. No history of smoking, alcohol abuse, diabetes, hyperlipidemia or prior history of coronary artery disease. No known history of obstructive sleep apnea.  He does not want to undergo sleep study to rule out sleep apnea.    He is overall doing well from cardiac standpoint and denies chest pain, shortness of breath, lower extremity edema, palpitations, dizziness or syncope. He is very sedentary. He is morbidly obese.    The patient had a cardiac MRI in 2015 which showed asymmetric hypertrophy of the septum at 1.5 cm with hyperdynamic LV systolic function. No major risk factor for primary prevention ICD.    Medications, personal, family, and social history reviewed with patient and revised.    PAST MEDICAL HISTORY:  Past Medical History:   Diagnosis Date     Hypertension      Hypertrophic cardiomyopathy (H)        CURRENT MEDICATIONS:  Current Outpatient Medications   Medication Sig Dispense Refill     aspirin (SB LOW DOSE ASA EC) 81 MG EC tablet  Take 81 mg by mouth daily       losartan-hydrochlorothiazide (HYZAAR) 50-12.5 MG tablet Take 1 tablet by mouth daily 30 tablet 5     metoprolol succinate ER (TOPROL-XL) 25 MG 24 hr tablet Take 1 tablet (25 mg) by mouth daily 30 tablet 5     multivitamin, therapeutic with minerals (MULTI-VITAMIN) TABS Take 1 tablet by mouth daily.         PAST SURGICAL HISTORY:  Past Surgical History:   Procedure Laterality Date     COLONOSCOPY  4/12/2012    Procedure:COLONOSCOPY; Colonoscopy, screening; Surgeon:ARANZA TEE; Location:[facility] OR       ALLERGIES:   No Known Allergies    FAMILY HISTORY:  Family History   Problem Relation Age of Onset     Cancer Mother         breast     Cancer Father         Multiple myeloma     Hypertension Father      Arrhythmia No family hx of      Myocardial Infarction No family hx of      Diabetes No family hx of      Coronary Artery Disease No family hx of      Cerebrovascular Disease No family hx of          SOCIAL HISTORY:  Social History     Tobacco Use     Smoking status: Never Smoker     Smokeless tobacco: Never Used     Tobacco comment: non-smoking household   Substance Use Topics     Alcohol use: Not Currently     Comment: 6 beers weekly     Drug use: No       ROS:   Constitutional: No fever, chills, or sweats. Weight stable.   ENT: No visual disturbance, ear ache, epistaxis, sore throat.   Cardiovascular: As per HPI.   Respiratory: No cough, hemoptysis.    GI: No nausea, vomiting, hematemesis, melena, or hematochezia.   : No hematuria.   Integument: Negative.   Psychiatric: Negative.   Hematologic:  No easy bruising, no easy bleeding.  Neuro: Negative.   Endocrinology: No significant heat or cold intolerance   Musculoskeletal: No myalgia.    Exam:  BP (!) 160/94 (BP Location: Right arm, Patient Position: Chair, Cuff Size: Adult Large)   Pulse 73   Wt 145.2 kg (320 lb)   SpO2 97%   BMI 43.40 kg/m    GENERAL APPEARANCE: alert and no distress  HEENT: no icterus, no central  cyanosis  LYMPH/NECK: no adenopathy, no asymmetry, JVP not elevated, no carotid bruits.  RESPIRATORY: lungs clear to auscultation - no rales, rhonchi or wheezes, no use of accessory muscles, no retractions, respirations are unlabored, normal respiratory rate  CARDIOVASCULAR: regular rhythm, normal S1, S2, no S3 or S4 and no murmur, click or rub, precordium quiet with normal PMI.  GI: soft, non tender  EXTREMITIES: peripheral pulses normal, no edema  NEURO: alert, normal speech,and affect  VASC: Radial, dorsalis pedis and posterior tibialis pulses are normal in volumes and symmetric bilaterally.   SKIN: no ecchymoses, no rashes     I have reviewed the labs and personally reviewed the imaging below and made my comment in the assessment and plan.    Labs:  CBC RESULTS:   Lab Results   Component Value Date    WBC 9.5 06/08/2018    RBC 4.79 06/08/2018    HGB 14.6 06/08/2018    HCT 43.4 06/08/2018    MCV 91 06/08/2018    MCH 30.5 06/08/2018    MCHC 33.6 06/08/2018    RDW 13.7 06/08/2018     06/08/2018       BMP RESULTS:  Lab Results   Component Value Date     06/03/2019    POTASSIUM 4.7 06/03/2019    CHLORIDE 105 06/03/2019    CO2 29 06/03/2019    ANIONGAP 6 06/03/2019     (H) 06/03/2019    BUN 20 06/03/2019    CR 1.04 06/03/2019    GFRESTIMATED 78 06/03/2019    GFRESTBLACK >90 06/03/2019    TATO 8.9 06/03/2019   Echocardiogram 7/2/2019  Technically difficult study. The patient's rhythm is atrial fibrillation with  rapid ventricular rates.     Global and regional left ventricular function is normal with an EF of 55-  60%.There is mild concentric left ventricular hypertrophy with mild  asymmetrical septal hypertrophy measuring 15 mm. Chordal systolic anterior  motion of the anterior mitral leaflet cannot be excluded.  There is no significant dynamic LV outflow tract obstruction.  Right ventricular function, chamber size, wall motion, and thickness are  normal.  No pericardial effusion  present.     Compared to prior TTE dated 10/19/15, no significant changes noted    Cardiac MRI 12/7/2015  1.  Normal left ventricular size, moderate asymmetric hypertrophy (1.5  cm maximal wall thickness) and hyperdynamic systolic function with a  calculated ejection fraction of  66 %.  2.  Normal right ventricular size and hyperdynamic systolic function  with a calculated ejection fraction of 68%.   3.  There is evidence of systolic anterior motion of the mitral valve  leaflet tips and chords with flow acceleration and LV outflow tract  obstruction.  4.  There is mild posterior directed mitral regurgitation.   5.  On delayed enhancement imaging, there is RV insertion site  hyperenhancement.  6.  Collectively these findings are consistent with the diagnosis of  hypertrophic cardiomyopathy     EKG 7/10/2019 sinus rhythm, V3 to V6 6 T wave inversion, lead I aVL T wave inversion    Assessment and Plan:   Mr. Owen Sahni is a 59 year old  male with PMH significant for nonobstructive hypertrophic cardiomyopathy and hypertension.    1.  Paroxysmal atrial fibrillation: Paroxysmal A. fib was detected during routine echocardiogram on 7/2/2019.  No prior history of A. fib.  The patient denies any symptoms during the exam.  Due to patient's history of HCM this is a class I indication for oral anticoagulation according to 2014 atrial fibrillation guideline.  This recommendation has not been changed in the most current 2019 A. fib guideline.  I recommended to discontinue aspirin and start warfarin (the patient's body mass index is over 40 therefore he is not a good candidate for NOAC).  I will check liver function, CBC and INR before initiation of warfarin.    2.  Nonobstructive hypertrophic cardiomyopathy: Diagnosed in 2015. I reviewed most recent ECHO. The images are not very typical of HCM. Only basal septum is thick. He is overall doing well from cardiac standpoint.  The patient denies cardiac symptoms.  No family  history of HCM.  No family history of sudden cardiac death. The patient does not have history of syncope.  Continue current treatment. No indication for primary prevention ICD.    3.  Hypertension: The patient is on losartan hydrochlorothiazide and metoprolol 25 mg.  Metoprolol has been recently increased to 50 mg daily.  The patient's blood pressure is elevated in clinic today however he reports well-controlled blood pressure at home.  I did not change his medications.    Return to clinic 6 months.    A total of 30 minutes spent face-toface with greater than 50% of the time spent in counseling and coordinating cares of the issues above.     Please donot hesitate to contact me if you have any questions or concerns. Again, thank you for allowing me to participate in the care of your patient.    Evelio MELGOZA MD  [facility] Division of Cardiology  Pager [phone]

## 2019-07-10 NOTE — LETTER
7/10/2019      RE: Owen Sahni  [address] [facility] [address]       Dear Colleague,    Thank you for the opportunity to participate in the care of your patient, Owen Sahni, at the [facility] HEART AT [facility]. Please see a copy of my visit note below.    Referring provider: Tracie Fishman PA-C    Chief complaint: None    HPI: Mr. Owen Sahni is a 59 year old  male with PMH significant for nonobstructive hypertrophic cardiomyopathy, morbid obesity and hypertension.    The patient is being seen today for atrial fibrillation detected during acquisition of images for transthoracic echocardiogram.  The patient has history of hypertrophic cardiomyopathy and he was recommended echocardiogram to recheck on his HCM by his PCP.  The patient does not report any symptoms during the echo study.      The patient does not have prior history of atrial fibrillation.  I reviewed EKG in clinic today which shows sinus rhythm with T wave inversion in V3 to V6.  No prior history of stroke.  Blood pressure is well controlled with losartan hydrochlorothiazide and metoprolol 25 mg.  The patient's metoprolol was increased to 50 mg daily since the detection of paroxysmal A. Fib.    The patient is a  and he is morbidly obese. No history of smoking, alcohol abuse, diabetes, hyperlipidemia or prior history of coronary artery disease. No known history of obstructive sleep apnea.  He does not want to undergo sleep study to rule out sleep apnea.    He is overall doing well from cardiac standpoint and denies chest pain, shortness of breath, lower extremity edema, palpitations, dizziness or syncope. He is very sedentary. He is morbidly obese.    The patient had a cardiac MRI in 2015 which showed asymmetric hypertrophy of the septum at 1.5 cm with hyperdynamic LV systolic function. No major risk factor for primary  prevention ICD.    Medications, personal, family, and social history reviewed with patient and revised.    PAST MEDICAL HISTORY:  Past Medical History:   Diagnosis Date     Hypertension      Hypertrophic cardiomyopathy (H)        CURRENT MEDICATIONS:  Current Outpatient Medications   Medication Sig Dispense Refill     aspirin (SB LOW DOSE ASA EC) 81 MG EC tablet Take 81 mg by mouth daily       losartan-hydrochlorothiazide (HYZAAR) 50-12.5 MG tablet Take 1 tablet by mouth daily 30 tablet 5     metoprolol succinate ER (TOPROL-XL) 25 MG 24 hr tablet Take 1 tablet (25 mg) by mouth daily 30 tablet 5     multivitamin, therapeutic with minerals (MULTI-VITAMIN) TABS Take 1 tablet by mouth daily.         PAST SURGICAL HISTORY:  Past Surgical History:   Procedure Laterality Date     COLONOSCOPY  4/12/2012    Procedure:COLONOSCOPY; Colonoscopy, screening; Surgeon:ARANZA TEE; Location:[facility] OR       ALLERGIES:   No Known Allergies    FAMILY HISTORY:  Family History   Problem Relation Age of Onset     Cancer Mother         breast     Cancer Father         Multiple myeloma     Hypertension Father      Arrhythmia No family hx of      Myocardial Infarction No family hx of      Diabetes No family hx of      Coronary Artery Disease No family hx of      Cerebrovascular Disease No family hx of          SOCIAL HISTORY:  Social History     Tobacco Use     Smoking status: Never Smoker     Smokeless tobacco: Never Used     Tobacco comment: non-smoking household   Substance Use Topics     Alcohol use: Not Currently     Comment: 6 beers weekly     Drug use: No       ROS:   Constitutional: No fever, chills, or sweats. Weight stable.   ENT: No visual disturbance, ear ache, epistaxis, sore throat.   Cardiovascular: As per HPI.   Respiratory: No cough, hemoptysis.    GI: No nausea, vomiting, hematemesis, melena, or hematochezia.   : No hematuria.   Integument: Negative.   Psychiatric: Negative.   Hematologic:  No easy bruising, no easy  bleeding.  Neuro: Negative.   Endocrinology: No significant heat or cold intolerance   Musculoskeletal: No myalgia.    Exam:  BP (!) 160/94 (BP Location: Right arm, Patient Position: Chair, Cuff Size: Adult Large)   Pulse 73   Wt 145.2 kg (320 lb)   SpO2 97%   BMI 43.40 kg/m     GENERAL APPEARANCE: alert and no distress  HEENT: no icterus, no central cyanosis  LYMPH/NECK: no adenopathy, no asymmetry, JVP not elevated, no carotid bruits.  RESPIRATORY: lungs clear to auscultation - no rales, rhonchi or wheezes, no use of accessory muscles, no retractions, respirations are unlabored, normal respiratory rate  CARDIOVASCULAR: regular rhythm, normal S1, S2, no S3 or S4 and no murmur, click or rub, precordium quiet with normal PMI.  GI: soft, non tender  EXTREMITIES: peripheral pulses normal, no edema  NEURO: alert, normal speech,and affect  VASC: Radial, dorsalis pedis and posterior tibialis pulses are normal in volumes and symmetric bilaterally.   SKIN: no ecchymoses, no rashes     I have reviewed the labs and personally reviewed the imaging below and made my comment in the assessment and plan.    Labs:  CBC RESULTS:   Lab Results   Component Value Date    WBC 9.5 06/08/2018    RBC 4.79 06/08/2018    HGB 14.6 06/08/2018    HCT 43.4 06/08/2018    MCV 91 06/08/2018    MCH 30.5 06/08/2018    MCHC 33.6 06/08/2018    RDW 13.7 06/08/2018     06/08/2018       BMP RESULTS:  Lab Results   Component Value Date     06/03/2019    POTASSIUM 4.7 06/03/2019    CHLORIDE 105 06/03/2019    CO2 29 06/03/2019    ANIONGAP 6 06/03/2019     (H) 06/03/2019    BUN 20 06/03/2019    CR 1.04 06/03/2019    GFRESTIMATED 78 06/03/2019    GFRESTBLACK >90 06/03/2019    TATO 8.9 06/03/2019   Echocardiogram 7/2/2019  Technically difficult study. The patient's rhythm is atrial fibrillation with  rapid ventricular rates.     Global and regional left ventricular function is normal with an EF of 55-  60%.There is mild concentric left  ventricular hypertrophy with mild  asymmetrical septal hypertrophy measuring 15 mm. Chordal systolic anterior  motion of the anterior mitral leaflet cannot be excluded.  There is no significant dynamic LV outflow tract obstruction.  Right ventricular function, chamber size, wall motion, and thickness are  normal.  No pericardial effusion present.     Compared to prior TTE dated 10/19/15, no significant changes noted    Cardiac MRI 12/7/2015  1.  Normal left ventricular size, moderate asymmetric hypertrophy (1.5  cm maximal wall thickness) and hyperdynamic systolic function with a  calculated ejection fraction of  66 %.  2.  Normal right ventricular size and hyperdynamic systolic function  with a calculated ejection fraction of 68%.   3.  There is evidence of systolic anterior motion of the mitral valve  leaflet tips and chords with flow acceleration and LV outflow tract  obstruction.  4.  There is mild posterior directed mitral regurgitation.   5.  On delayed enhancement imaging, there is RV insertion site  hyperenhancement.  6.  Collectively these findings are consistent with the diagnosis of  hypertrophic cardiomyopathy     EKG 7/10/2019 sinus rhythm, V3 to V6 6 T wave inversion, lead I aVL T wave inversion    Assessment and Plan:   Mr. Owen Sahni is a 59 year old  male with PMH significant for nonobstructive hypertrophic cardiomyopathy and hypertension.    1.  Paroxysmal atrial fibrillation: Paroxysmal A. fib was detected during routine echocardiogram on 7/2/2019.  No prior history of A. fib.  The patient denies any symptoms during the exam.  Due to patient's history of HCM this is a class I indication for oral anticoagulation according to 2014 atrial fibrillation guideline.  This recommendation has not been changed in the most current 2019 A. fib guideline.  I recommended to discontinue aspirin and start warfarin (the patient's body mass index is over 40 therefore he is not a good candidate for NOAC).  I  will check liver function, CBC and INR before initiation of warfarin.    2.  Nonobstructive hypertrophic cardiomyopathy: Diagnosed in 2015. I reviewed most recent ECHO. The images are not very typical of HCM. Only basal septum is thick. He is overall doing well from cardiac standpoint.  The patient denies cardiac symptoms.  No family history of HCM.  No family history of sudden cardiac death. The patient does not have history of syncope.  Continue current treatment. No indication for primary prevention ICD.    3.  Hypertension: The patient is on losartan hydrochlorothiazide and metoprolol 25 mg.  Metoprolol has been recently increased to 50 mg daily.  The patient's blood pressure is elevated in clinic today however he reports well-controlled blood pressure at home.  I did not change his medications.    Return to clinic 6 months.    A total of 30 minutes spent face-toface with greater than 50% of the time spent in counseling and coordinating cares of the issues above.     Please donot hesitate to contact me if you have any questions or concerns. Again, thank you for allowing me to participate in the care of your patient.    Evelio MELGOZA MD  [facility] Division of Cardiology  Pager [phone]

## 2019-07-10 NOTE — LETTER
July 11, 2019       TO: Owen Sahni  [address] [facility] [address]       Dear Parul Sahni,    Your blood tests are all normal.       Dr Benito Simon Orders   Hepatic panel   Result Value Ref Range    Bilirubin Direct 0.1 0.0 - 0.2 mg/dL    Bilirubin Total 0.4 0.2 - 1.3 mg/dL    Albumin 3.9 3.4 - 5.0 g/dL    Protein Total 7.7 6.8 - 8.8 g/dL    Alkaline Phosphatase 81 40 - 150 U/L    ALT 57 0 - 70 U/L    AST 32 0 - 45 U/L   INR   Result Value Ref Range    INR 0.93 0.86 - 1.14

## 2019-07-10 NOTE — PATIENT INSTRUCTIONS
Thank you for coming to the [facility] @ [facility]; please note the following instructions:    1. *START: Warfarin 5 mg once daily- Start on Thursday 5/11/19- Take in PM    2. *STOP: Aspirin    3. Metoprolol 50mg tabs sent to your pharmacy    4. Labs today and on Monday. (Monday is just the INR)     5. 6 month follow up. please see following pages for appointment detail information.          If you have any questions regarding your visit please contact your care team:     Cardiology  Telephone Number   Ene ISAACS, RN  Kathrin DOMINGUEZ, RN   Chasity QUIROZ, RMA  Marycarmen NELSON, RMA  Jeremias PARR, [facility]   [phone] (option 1)   For scheduling appts:     [phone] (select option 1)       For the Device Clinic (Pacemakers and ICD's)  RN's :  Steph Bello   During business hours: [phone]    *After business hours:  [phone] (select option 4)      Normal test result notifications will be released via [employer] or mailed within 7 business days.  All other test results, will be communicated via telephone once reviewed by your cardiologist.    If you need a medication refill please contact your pharmacy.  Please allow 3 business days for your refill to be completed.    As always, thank you for trusting us with your health care needs!    Patient Education     Patient Education    Warfarin Sodium Oral tablet    Warfarin Sodium Solution for injection  Warfarin Sodium Oral tablet  What is this medicine?  WARFARIN (WAR far in) is an anticoagulant. It is used to treat or prevent clots in the veins, arteries, lungs, or heart.  This medicine may be used for other purposes; ask your health care provider or pharmacist if you have questions.  What should I tell my health care provider before I take this medicine?  They need to know if you have any of these conditions:    alcoholism    anemia    bleeding disorders    cancer    diabetes    heart disease    high blood pressure    history of bleeding in the gastrointestinal  tract    history of stroke or other brain injury or disease    kidney or liver disease    protein C deficiency    protein S deficiency    psychosis or dementia    recent injury, recent or planned surgery or procedure    an unusual or allergic reaction to warfarin, other medicines, foods, dyes, or preservatives    pregnant or trying to get pregnant    breast-feeding  How should I use this medicine?  Take this medicine by mouth with a glass of water. Follow the directions on the prescription label. You can take this medicine with or without food. Take your medicine at the same time each day. Do not take it more often than directed. Do not stop taking except on your doctor's advice. Stopping this medicine may increase your risk of a blood clot. Be sure to refill your prescription before you run out of medicine.  If your doctor or healthcare professional calls to change your dose, write down the dose and any other instructions. Always read the dose and instructions back to him or her to make sure you understand them. Tell your doctor or healthcare professional what strength of tablets you have on hand. Ask how many tablets you should take to equal your new dose. Write the date on the new instructions and keep them near your medicine. If you are told to stop taking your medicine until your next blood test, call your doctor or healthcare professional if you do not hear anything within 24 hours of the test to find out your new dose or when to restart your prior dose.  A special MedGuide will be given to you by the pharmacist with each prescription and refill. Be sure to read this information carefully each time.  Talk to your pediatrician regarding the use of this medicine in children. Special care may be needed.  Overdosage: If you think you have taken too much of this medicine contact a poison control center or emergency room at once.  NOTE: This medicine is only for you. Do not share this medicine with others.  What if  I miss a dose?  It is important not to miss a dose. If you miss a dose, call your healthcare provider. Take the dose as soon as possible on the same day. If it is almost time for your next dose, take only that dose. Do not take double or extra doses to make up for a missed dose.  What may interact with this medicine?  Do not take this medicine with any of the following medications:    agents that prevent or dissolve blood clots    aspirin or other salicylates    danshen    dextrothyroxine    mifepristone    M[place]owood's Wort    red yeast rice  This medicine may also interact with the following medications:    acetaminophen    agents that lower cholesterol    alcohol    allopurinol    amiodarone    antibiotics or medicines for treating bacterial, fungal or viral infections    azathioprine    barbiturate medicines for inducing sleep or treating seizures    certain medicines for diabetes    certain medicines for heart rhythm problems    certain medicines for high blood pressure    chloral hydrate    cisapride    disulfiram    female hormones, including contraceptive or birth control pills    general anesthetics    herbal or dietary products like garlic, ginkgo, ginseng, green tea, or kava kava    influenza virus vaccine    male hormones    medicines for mental depression or psychosis    medicines for some types of cancer    medicines for stomach problems    methylphenidate    NSAIDs, medicines for pain and inflammation, like ibuprofen or naproxen    propoxyphene    quinidine, quinine    raloxifene    seizure or epilepsy medicine like carbamazepine, phenytoin, and valproic acid    steroids like cortisone and prednisone    tamoxifen    thyroid medicine    tramadol    vitamin c, vitamin e, and vitamin K    zafirlukast    zileuton  This list may not describe all possible interactions. Give your health care provider a list of all the medicines, herbs, non-prescription drugs, or dietary supplements you use. Also tell them if  you smoke, drink alcohol, or use illegal drugs. Some items may interact with your medicine.  What should I watch for while using this medicine?  Visit your doctor or health care professional for regular checks on your progress. You will need to have a blood test called a PT/INR regularly. The PT/INR blood test is done to make sure you are getting the right dose of this medicine. It is important to not miss your appointment for the blood tests. When you first start taking this medicine, these tests are done often. Once the correct dose is determined and you take your medicine properly, these tests can be done less often.  Notify your doctor or health care professional and seek emergency treatment if you develop breathing problems; changes in vision; chest pain; severe, sudden headache; pain, swelling, warmth in the leg; trouble speaking; sudden numbness or weakness of the face, arm or leg. These can be signs that your condition has gotten worse.  While you are taking this medicine, carry an identification card with your name, the name and dose of medicine(s) being used, and the name and phone number of your doctor or health care professional or person to contact in an emergency.  Do not start taking or stop taking any medicines or over-the-counter medicines except on the advice of your doctor or health care professional.  You should discuss your diet with your doctor or health care professional. Do not make major changes in your diet. Vitamin K can affect how well this medicine works. Many foods contain vitamin K. It is important to eat a consistent amount of foods with vitamin K. Other foods with vitamin K that you should eat in consistent amounts are asparagus, basil, beef or pork liver, black eyed peas, broccoli, brussel sprouts, cabbage, chickpeas, cucumber with peel, green onions, green tea, okra, parsley, peas, thyme, and green leafy vegetables like beet greens, deejay greens, endive, kale, mustard greens,  spinach, turnip greens, watercress, or certain lettuces like green leaf or anthony.  This medicine can cause birth defects or bleeding in an unborn child. Women of childbearing age should use effective birth control while taking this medicine. If a woman becomes pregnant while taking this medicine, she should discuss the potential risks and her options with her health care professional.  Avoid sports and activities that might cause injury while you are using this medicine. Severe falls or injuries can cause unseen bleeding. Be careful when using sharp tools or knives. Consider using an electric razor. Take special care brushing or flossing your teeth. Report any injuries, bruising, or red spots on the skin to your doctor or health care professional.  If you have an illness that causes vomiting, diarrhea, or fever for more than a few days, contact your doctor. Also check with your doctor if you are unable to eat for several days. These problems can change the effect of this medicine.  Even after you stop taking this medicine, it takes several days before your body recovers its normal ability to clot blood. Ask your doctor or health care professional how long you need to be careful. If you are going to have surgery or dental work, tell your doctor or health care professional that you have been taking this medicine.  What side effects may I notice from receiving this medicine?  Side effects that you should report to your doctor or health care professional as soon as possible:    back pain    chills    dizziness    fever    heavy menstrual bleeding or vaginal bleeding    painful, blue, or purple toes    painful, prolonged erection    signs and symptoms of bleeding such as bloody or black, tarry stools; red or dark-brown urine; spitting up blood or brown material that looks like coffee grounds; red spots on the skin; unusual bruising or bleeding from the eye, gums, or noseskin rash, itching or skin damage    stomach  pain    unusually weak or tired    yellowing of skin or eyes  Side effects that usually do not require medical attention (report to your doctor or health care professional if they continue or are bothersome):    diarrhea    hair loss  This list may not describe all possible side effects. Call your doctor for medical advice about side effects. You may report side effects to FDA at 8-769-KSD-6473.  Where should I keep my medicine?  Keep out of the reach of children.  Store at room temperature between 15 and 30 degrees C (59 and 86 degrees F). Protect from light. Throw away any unused medicine after the expiration date. Do not flush down the toilet.  NOTE: This sheet is a summary. It may not cover all possible information. If you have questions about this medicine, talk to your doctor, pharmacist, or health care provider.  NOTE:This sheet is a summary. It may not cover all possible information. If you have questions about this medicine, talk to your doctor, pharmacist, or health care provider. Copyright  2016 Gold Standard

## 2019-07-12 ENCOUNTER — TELEPHONE (OUTPATIENT)
Dept: FAMILY MEDICINE | Facility: CLINIC | Age: 59
End: 2019-07-12

## 2019-07-12 NOTE — TELEPHONE ENCOUNTER
Spoke with patient and answered his questions. Has new order to start warfarin for A-fib. Will be starting today instead of yesterday as he just got back in town.He is a .  Referral in chart. INR will be done on Monday in lab and I will call him with results and directions on Monday. Scheduled on Wednesday 7/17 for initial nurse consult visit.  Sushila Velazquez RN

## 2019-07-15 ENCOUNTER — ANTICOAGULATION THERAPY VISIT (OUTPATIENT)
Dept: FAMILY MEDICINE | Facility: CLINIC | Age: 59
End: 2019-07-15

## 2019-07-15 DIAGNOSIS — I48.91 ATRIAL FIBRILLATION (H): ICD-10-CM

## 2019-07-15 DIAGNOSIS — R00.2 PALPITATIONS: ICD-10-CM

## 2019-07-15 DIAGNOSIS — I10 ESSENTIAL HYPERTENSION WITH GOAL BLOOD PRESSURE LESS THAN 140/90: ICD-10-CM

## 2019-07-15 DIAGNOSIS — I42.1 HYPERTROPHIC OBSTRUCTIVE CARDIOMYOPATHY (H): ICD-10-CM

## 2019-07-15 DIAGNOSIS — I48.0 PAF (PAROXYSMAL ATRIAL FIBRILLATION) (H): ICD-10-CM

## 2019-07-15 DIAGNOSIS — Z79.01 LONG TERM (CURRENT) USE OF ANTICOAGULANTS: ICD-10-CM

## 2019-07-15 LAB — INR PPP: 1.1 (ref 0.86–1.14)

## 2019-07-15 PROCEDURE — 99207 ZZC NO CHARGE NURSE ONLY: CPT | Performed by: FAMILY MEDICINE

## 2019-07-15 PROCEDURE — 36416 COLLJ CAPILLARY BLOOD SPEC: CPT | Performed by: INTERNAL MEDICINE

## 2019-07-15 PROCEDURE — 85610 PROTHROMBIN TIME: CPT | Performed by: INTERNAL MEDICINE

## 2019-07-15 NOTE — PROGRESS NOTES
ANTICOAGULATION FOLLOW-UP CLINIC VISIT    Patient Name:  Owen Sahni  Date:  7/15/2019  Contact Type:  Telephone    SUBJECTIVE:  Patient Findings     Positives:   Change in medications    Comments:   Patient had INR done in lab today. New start on warfarin per cardiology and has initial nurse visit for teaching scheduled in 2 days. Has take 3 doses of warfarin 5 mg. Spoke with patient on phone and instructed to increase dose to 7.5  Mg  Today and tomorrow. Patient verbalized understanding.  Sushila Velazquez RN          Clinical Outcomes     Comments:   Patient had INR done in lab today. New start on warfarin per cardiology and has initial nurse visit for teaching scheduled in 2 days. Has take 3 doses of warfarin 5 mg. Spoke with patient on phone and instructed to increase dose to 7.5  Mg  Today and tomorrow. Patient verbalized understanding.  Sushila Velazquez RN             OBJECTIVE    INR   Date Value Ref Range Status   07/15/2019 1.10 0.86 - 1.14 Final     Comment:     This test is intended for monitoring Coumadin therapy.  Results are not   accurate in patients with prolonged INR due to factor deficiency.         ASSESSMENT / PLAN  INR assessment SUB    Recheck INR In: 2 DAYS    INR Location Clinic      Anticoagulation Summary  As of 7/15/2019    INR goal:   2.0-3.0   TTR:   --   INR used for dosin.10! (7/15/2019)   Warfarin maintenance plan:   No maintenance plan   Full warfarin instructions:   7/15: 7.5 mg; : 7.5 mg   Plan last modified:   Sushila Velazquez RN (7/15/2019)   Next INR check:   2019   Target end date:       Indications    Atrial fibrillation (H) [I48.91]  Long term (current) use of anticoagulants [Z79.01]             Anticoagulation Episode Summary     INR check location:       Preferred lab:       Send INR reminders to:   VIDA EPPERSON    Comments:         Anticoagulation Care Providers     Provider Role Specialty Phone number    Evelio Oliver MD Referring Cardiology  [phone]            See the Encounter Report to view Anticoagulation Flowsheet and Dosing Calendar (Go to Encounters tab in chart review, and find the Anticoagulation Therapy Visit)    Dosage adjustment made based on physician directed care plan.    Sushila Velazquez RN

## 2019-07-17 ENCOUNTER — ANTICOAGULATION THERAPY VISIT (OUTPATIENT)
Dept: NURSING | Facility: CLINIC | Age: 59
End: 2019-07-17
Payer: COMMERCIAL

## 2019-07-17 DIAGNOSIS — I48.91 ATRIAL FIBRILLATION (H): ICD-10-CM

## 2019-07-17 DIAGNOSIS — Z79.01 LONG TERM (CURRENT) USE OF ANTICOAGULANTS: ICD-10-CM

## 2019-07-17 LAB — INR POINT OF CARE: 1.3 (ref 0.86–1.14)

## 2019-07-17 PROCEDURE — 36416 COLLJ CAPILLARY BLOOD SPEC: CPT

## 2019-07-17 PROCEDURE — 99211 OFF/OP EST MAY X REQ PHY/QHP: CPT

## 2019-07-17 PROCEDURE — 85610 PROTHROMBIN TIME: CPT | Mod: QW

## 2019-07-17 NOTE — PROGRESS NOTES
ANTICOAGULATION INITIAL CLINIC VISIT    Patient Name:  Owen Sahni  Date:  2019  Referred by: Dr Evelio Oliver  Contact Type:  Face to Face    SUBJECTIVE:  Coumadin education was completed today.  Topics covered include:  -Introduction to coumadin  -Proper Administration  -INR Testing  -Sign/Symptoms of Bleeding  -Signs/Symptoms of Clot Formation or Stroke  -Dietary Intake of Vitamin K  -Drug Interactions  -Anticoagulation Identification (bracelet, necklace or wallet card)  -Future Surgery  -Effects of Alcohol, Tobacco, and Exercise on Coumadin    Coumadin Education Booklet  were given to the patient.    Patient Findings     Positives:   Change in medications    Comments:   New to warfarin for A-fib. Teaching done as per protocol. Questions answered. He is a .         Clinical Outcomes     Comments:   New to warfarin for A-fib. Teaching done as per protocol. Questions answered. He is a .           OBJECTIVE    INR Protime   Date Value Ref Range Status   2019 1.3 (A) 0.86 - 1.14 Final       ASSESSMENT / PLAN  INR assessment SUB    Recheck INR In: 2 DAYS    INR Location Clinic      Anticoagulation Summary  As of 2019    INR goal:   2.0-3.0   TTR:   --   INR used for dosin.3! (2019)   Warfarin maintenance plan:   No maintenance plan   Full warfarin instructions:   : 10 mg; : 10 mg   Plan last modified:   Sushila Velazquez RN (7/15/2019)   Next INR check:   2019   Target end date:       Indications    Atrial fibrillation (H) [I48.91]  Long term (current) use of anticoagulants [Z79.01]             Anticoagulation Episode Summary     INR check location:       Preferred lab:       Send INR reminders to:   VIDA EPPERSON    Comments:         Anticoagulation Care Providers     Provider Role Specialty Phone number    Evelio Oliver MD Referring Cardiology [phone]            See the Encounter Report to view Anticoagulation Flowsheet and Dosing Calendar (Go  to Encounters tab in chart review, and find the Anticoagulation Therapy Visit)    Dosage adjustment made based on physician directed care plan.    Sushila Velazquez RN

## 2019-07-19 ENCOUNTER — ANTICOAGULATION THERAPY VISIT (OUTPATIENT)
Dept: NURSING | Facility: CLINIC | Age: 59
End: 2019-07-19
Payer: COMMERCIAL

## 2019-07-19 DIAGNOSIS — Z79.01 LONG TERM (CURRENT) USE OF ANTICOAGULANTS: ICD-10-CM

## 2019-07-19 DIAGNOSIS — I48.91 ATRIAL FIBRILLATION (H): ICD-10-CM

## 2019-07-19 LAB — INR POINT OF CARE: 1.8 (ref 0.86–1.14)

## 2019-07-19 PROCEDURE — 85610 PROTHROMBIN TIME: CPT | Mod: QW

## 2019-07-19 PROCEDURE — 36416 COLLJ CAPILLARY BLOOD SPEC: CPT

## 2019-07-19 NOTE — PROGRESS NOTES
ANTICOAGULATION FOLLOW-UP CLINIC VISIT    Patient Name:  Owen Sahni  Date:  2019  Contact Type:  Face to Face    SUBJECTIVE:  Patient Findings     Positives:   Change in medications    Comments:   Day 8 of new start. 1.9 up from 1.3 in 2 days. Continue 10 mg daily. Recheck in 3 days.         Clinical Outcomes     Comments:   Day 8 of new start. 1.9 up from 1.3 in 2 days. Continue 10 mg daily. Recheck in 3 days.            OBJECTIVE    INR Protime   Date Value Ref Range Status   2019 1.8 (A) 0.86 - 1.14 Final       ASSESSMENT / PLAN  INR assessment SUB    Recheck INR In: 3 DAYS    INR Location Clinic      Anticoagulation Summary  As of 2019    INR goal:   2.0-3.0   TTR:   --   INR used for dosin.8! (2019)   Warfarin maintenance plan:   No maintenance plan   Full warfarin instructions:   : 10 mg; : 10 mg; : 10 mg   Plan last modified:   Sushila Velazquez RN (7/15/2019)   Next INR check:   2019   Target end date:       Indications    Atrial fibrillation (H) [I48.91]  Long term (current) use of anticoagulants [Z79.01]             Anticoagulation Episode Summary     INR check location:       Preferred lab:       Send INR reminders to:   VIDA EPPERSON    Comments:         Anticoagulation Care Providers     Provider Role Specialty Phone number    Evelio Oliver MD Referring Cardiology [phone]            See the Encounter Report to view Anticoagulation Flowsheet and Dosing Calendar (Go to Encounters tab in chart review, and find the Anticoagulation Therapy Visit)        Sushila Velazquez RN

## 2019-07-22 ENCOUNTER — ANTICOAGULATION THERAPY VISIT (OUTPATIENT)
Dept: NURSING | Facility: CLINIC | Age: 59
End: 2019-07-22
Payer: COMMERCIAL

## 2019-07-22 DIAGNOSIS — Z79.01 LONG TERM (CURRENT) USE OF ANTICOAGULANTS: ICD-10-CM

## 2019-07-22 DIAGNOSIS — I48.91 ATRIAL FIBRILLATION (H): ICD-10-CM

## 2019-07-22 LAB — INR POINT OF CARE: 2.7 (ref 0.86–1.14)

## 2019-07-22 PROCEDURE — 85610 PROTHROMBIN TIME: CPT | Mod: QW

## 2019-07-22 PROCEDURE — 36416 COLLJ CAPILLARY BLOOD SPEC: CPT

## 2019-07-22 NOTE — PROGRESS NOTES
ANTICOAGULATION FOLLOW-UP CLINIC VISIT    Patient Name:  Owen Sahni  Date:  2019  Contact Type:  Face to Face    SUBJECTIVE:  Patient Findings     Comments:   Therapeutic. No new concerns. Will dose for one week as he is unable to come in on Thursday and no scheduled Friday.         Clinical Outcomes     Comments:   Therapeutic. No new concerns. Will dose for one week as he is unable to come in on Thursday and no scheduled Friday.            OBJECTIVE    INR Protime   Date Value Ref Range Status   2019 2.7 (A) 0.86 - 1.14 Final       ASSESSMENT / PLAN  INR assessment THER    Recheck INR In: 1 WEEK    INR Location Clinic      Anticoagulation Summary  As of 2019    INR goal:   2.0-3.0   TTR:   --   INR used for dosin.7 (2019)   Warfarin maintenance plan:   7.5 mg (5 mg x 1.5) every Mon, Thu; 10 mg (5 mg x 2) all other days   Full warfarin instructions:   : 7.5 mg; Otherwise 7.5 mg every Mon, Thu; 10 mg all other days   Weekly warfarin total:   65 mg   Plan last modified:   Sushila Velazquez RN (2019)   Next INR check:   2019   Target end date:       Indications    Atrial fibrillation (H) [I48.91]  Long term (current) use of anticoagulants [Z79.01]             Anticoagulation Episode Summary     INR check location:       Preferred lab:       Send INR reminders to:   VIDA EPPERSON    Comments:         Anticoagulation Care Providers     Provider Role Specialty Phone number    Evelio Oliver MD Referring Cardiology [phone]            See the Encounter Report to view Anticoagulation Flowsheet and Dosing Calendar (Go to Encounters tab in chart review, and find the Anticoagulation Therapy Visit)        Sushila Velazquez RN

## 2019-07-29 ENCOUNTER — ANTICOAGULATION THERAPY VISIT (OUTPATIENT)
Dept: NURSING | Facility: CLINIC | Age: 59
End: 2019-07-29
Payer: COMMERCIAL

## 2019-07-29 DIAGNOSIS — I48.91 ATRIAL FIBRILLATION (H): ICD-10-CM

## 2019-07-29 DIAGNOSIS — Z79.01 LONG TERM (CURRENT) USE OF ANTICOAGULANTS: ICD-10-CM

## 2019-07-29 LAB — INR POINT OF CARE: 2.5 (ref 0.86–1.14)

## 2019-07-29 PROCEDURE — 36416 COLLJ CAPILLARY BLOOD SPEC: CPT

## 2019-07-29 PROCEDURE — 85610 PROTHROMBIN TIME: CPT | Mod: QW

## 2019-08-05 ENCOUNTER — OFFICE VISIT (OUTPATIENT)
Dept: FAMILY MEDICINE | Facility: CLINIC | Age: 59
End: 2019-08-05
Payer: COMMERCIAL

## 2019-08-05 ENCOUNTER — ANTICOAGULATION THERAPY VISIT (OUTPATIENT)
Dept: NURSING | Facility: CLINIC | Age: 59
End: 2019-08-05
Payer: COMMERCIAL

## 2019-08-05 VITALS — BODY MASS INDEX: 43.4 KG/M2 | TEMPERATURE: 98.4 F | WEIGHT: 315 LBS | HEART RATE: 75 BPM | OXYGEN SATURATION: 96 %

## 2019-08-05 DIAGNOSIS — B86 SCABIES: Primary | ICD-10-CM

## 2019-08-05 DIAGNOSIS — I48.91 ATRIAL FIBRILLATION (H): ICD-10-CM

## 2019-08-05 DIAGNOSIS — Z79.01 LONG TERM (CURRENT) USE OF ANTICOAGULANTS: ICD-10-CM

## 2019-08-05 LAB — INR POINT OF CARE: 2.6 (ref 0.86–1.14)

## 2019-08-05 PROCEDURE — 36416 COLLJ CAPILLARY BLOOD SPEC: CPT

## 2019-08-05 PROCEDURE — 99213 OFFICE O/P EST LOW 20 MIN: CPT | Performed by: NURSE PRACTITIONER

## 2019-08-05 PROCEDURE — 99207 ZZC NO CHARGE NURSE ONLY: CPT

## 2019-08-05 PROCEDURE — 85610 PROTHROMBIN TIME: CPT | Mod: QW

## 2019-08-05 RX ORDER — PERMETHRIN 50 MG/G
CREAM TOPICAL
Qty: 60 G | Refills: 1 | Status: SHIPPED | OUTPATIENT
Start: 2019-08-05 | End: 2019-10-18

## 2019-08-05 NOTE — PROGRESS NOTES
ANTICOAGULATION FOLLOW-UP CLINIC VISIT    Patient Name:  Owen Sahni  Date:  2019  Contact Type:  Face to Face    SUBJECTIVE:  Patient Findings     Positives:   Change in health, Change in medications    Comments:   Patient seen today, given prescription for Elimite cream and Augmentin for bug bites    Anjelica HALEY, RN, CPN          Clinical Outcomes     Comments:   Patient seen today, given prescription for Elimite cream and Augmentin for bug bites    Anjelica HALEY, RN, CPN             OBJECTIVE    INR Protime   Date Value Ref Range Status   2019 2.6 (A) 0.86 - 1.14 Final       ASSESSMENT / PLAN  INR assessment THER    Recheck INR In: 4 DAYS    INR Location Clinic      Anticoagulation Summary  As of 2019    INR goal:   2.0-3.0   TTR:   100.0 % (1.6 wk)   INR used for dosin.6 (2019)   Warfarin maintenance plan:   7.5 mg (5 mg x 1.5) every Mon, Thu; 10 mg (5 mg x 2) all other days   Full warfarin instructions:   7.5 mg every Mon, Thu; 10 mg all other days   Weekly warfarin total:   65 mg   No change documented:   Anjelica Harris RN   Plan last modified:   Sushila Velazquez RN (2019)   Next INR check:   2019   Target end date:       Indications    Atrial fibrillation (H) [I48.91]  Long term (current) use of anticoagulants [Z79.01]             Anticoagulation Episode Summary     INR check location:       Preferred lab:       Send INR reminders to:   VIDA EPPERSON    Comments:         Anticoagulation Care Providers     Provider Role Specialty Phone number    Evelio Oliver MD Referring Cardiology [phone]            See the Encounter Report to view Anticoagulation Flowsheet and Dosing Calendar (Go to Encounters tab in chart review, and find the Anticoagulation Therapy Visit)        Anjelica Harris, SANDEEP

## 2019-08-05 NOTE — PROGRESS NOTES
SUBJECTIVE:  Owen Sahni is a 59 year old male who presents to the clinic today for a rash.  Onset of rash was 3 weeks ago.   Rash is worsening.  Location of the rash: widespread body  Quality/symptoms of rash: itching   Symptoms are moderate and rash seems to be worsening.  Previous history of a similar rash? No  Recent exposure history: . Stayed at motel before symptoms started, believes it is scabies.   Has just been using otc treatments  No fever or chills.     Past Medical History:   Diagnosis Date     Hypertension      Hypertrophic cardiomyopathy (H)      Current Outpatient Medications   Medication Sig Dispense Refill     losartan-hydrochlorothiazide (HYZAAR) 50-12.5 MG tablet Take 1 tablet by mouth daily 30 tablet 5     metoprolol succinate ER (TOPROL-XL) 50 MG 24 hr tablet Take 1 tablet (50 mg) by mouth daily 90 tablet 3     multivitamin, therapeutic with minerals (MULTI-VITAMIN) TABS Take 1 tablet by mouth daily.       warfarin (COUMADIN) 5 MG tablet Take 1 tablet (5 mg) by mouth daily 90 tablet 3     Social History     Tobacco Use     Smoking status: Never Smoker     Smokeless tobacco: Never Used     Tobacco comment: non-smoking household   Substance Use Topics     Alcohol use: Not Currently     Comment: 6 beers weekly       ROS:  CONSTITUTIONAL:NEGATIVE for fever, chills, change in weight  INTEGUMENTARY/SKIN: POSITIVE for rash  EYES: NEGATIVE for vision changes or irritation  ENT/MOUTH: NEGATIVE for ear, mouth and throat problems  RESP:NEGATIVE for significant cough or SOB  CV: NEGATIVE for chest pain, palpitations or peripheral edema  GI: NEGATIVE for nausea, abdominal pain, heartburn, or change in bowel habits  : negative for dysuria, hematuria, decreased urinary stream, erectile dysfunction  MUSCULOSKELETAL: NEGATIVE for significant arthralgias or myalgia  NEURO: NEGATIVE for weakness, dizziness or paresthesias  ENDOCRINE: NEGATIVE for temperature intolerance, skin/hair  changes  HEME/ALLERGY/IMMUNE: NEGATIVE for bleeding problems  PSYCHIATRIC: NEGATIVE for changes in mood or affect    EXAM:   Pulse 75   Temp 98.4  F (36.9  C) (Tympanic)   Wt 145.2 kg (320 lb)   SpO2 96%   BMI 43.40 kg/m    SKIN: Rash description: multiple small, erythematous papules, excoriated on arms legs torso  GENERAL APPEARANCE: healthy, alert and no distress  EYES: EOMI,  PERRL, conjunctiva clear  NECK: supple, non-tender to palpation, no adenopathy noted  RESP: lungs clear to auscultation - no rales, rhonchi or wheezes  CV: regular rates and rhythm, normal S1 S2, no murmur noted    ASSESSMENT:  (B86) Scabies  (primary encounter diagnosis)    Plan:   Treated with permethrin (ELIMITE) 5 % external cream,         amoxicillin-clavulanate (AUGMENTIN) 875-125 MG tablet some wounds were open from scratching so covered for secondary bacterial infection    Home care, patient education given regarding cleaning and contagiousness.     Follow-up with primary clinic if not improving    CARLENE Amado CNP

## 2019-08-05 NOTE — PATIENT INSTRUCTIONS
Patient Education     Scabies  Scabies is a skin infection. It is caused by a tiny parasitic insect, or mite, that is too small to see directly. It can be seen under a microscope, but it is usually recognized only by the rash and symptoms it causes. This can make it hard to diagnose since the signs and symptoms can be similar to other diseases.  The scabies mite tunnels under the skin. It creates a small burrow, where it leaves its eggs. These eggs cohen and grow into adults. They then create new burrows over the next 1 to 2 weeks. The mites die in about 4 to 6 weeks. The rash and itching are caused by an allergic reaction to the scabies saliva or feces.  Scabies is highly contagious. It is spread by direct skin contact. It is easily spread by close personal contact, sexual contact, or by sharing bed linens or clothing used by an infected person.  It may take 4 to 6 weeks for symptoms to appear after being exposed. Everyone living in the house with you, as well as your sexual partners, should be treated at the same time. After the first treatment, you will no longer be contagious. You may return to work, school or .  Home care    Machine wash in hot water all sheets, towels, pillowcases, underwear, pajamas, and any other clothing you have worn lately. Use the hot cycle of a dryer or use a hot iron to sterilize.    Seal anything that is hard to wash in a plastic trash bag for 4 days. This includes coats, jackets, blankets, and bedspreads. (The insects die after 3 days off the human body.)  Medicines  Scabicides  Medicines used to treat scabies are called scabicides. These are creams that kill the scabies mites. A prescription is needed. When using these medicines:    Always follow instructions provided by your healthcare provider and pharmacist. Also follow the printed instructions that come with the medicine.    Talk with your provider about precautions to take when using these medicines.    Use the cream  on your body when your skin is cool and dry. Don t use it after a hot shower or bath.    Usually the cream is put on your whole body. This means from your chin all the way down to your toes. Scabies does not usually affect an adult s head. So cream is not needed there. For children, discuss this with your child s provider.    Leave the cream or lotion on for the recommended amount of time. This is usually 8 to 12 hours.    Don t leave cream or lotion on your skin longer than directed. Don t use more than recommended.    Clean clothes should be worn after the treatment.    If you wash your hands after using the cream, you will need to reapply the cream to your hands.    If you are breastfeeding, wash off your nipples before feeding. Then reapply the cream after breastfeeding.    For babies or infants, put mittens on their hands. This will stop them from licking the cream or lotion. It will also stop them from scratching themselves because of the itching.  Other medicines    An oral medicine called ivermectin may be prescribed for severe cases. It may also be used if you can t apply creams.    Itching may cause the most discomfort. If large areas of your skin are affected, over-the-counter antihistamines may be used to reduce itching. Or you may be given a prescription antihistamine. Some of these medicines may make you sleepy. They are best used at bedtime. Antihistamines that don t make you sleepy can be used during the day. Note: Don t use medicine that has diphenhydramine if you have glaucoma, or if you are a man who has trouble urinating due to an enlarged prostate.    If you were given antibiotics due to a bacterial infection, take them until they are finished. It is important to finish the antibiotics even if the wound looks better. This is to make sure the infection has cleared.  Follow-up care  Follow up with your healthcare provider, or as advised. Call your provider if your symptoms don t improve after 1  week, or if new burrows or rashes appear.  When to seek medical advice  Call your healthcare provider right away if any of these occur:    Yellow-brown crusts or drainage from the sores    Other signs of infection, including increasing redness, swelling, pain, or pus    Fever of 100.4 F (38 C) or higher, or as directed by your provider  Date Last Reviewed: 8/1/2016 2000-2018 [employer]. [address] [place] [address], [place] [address]. All rights reserved. This information is not intended as a substitute for professional medical care. Always follow your healthcare professional's instructions.

## 2019-08-12 ENCOUNTER — ANTICOAGULATION THERAPY VISIT (OUTPATIENT)
Dept: NURSING | Facility: CLINIC | Age: 59
End: 2019-08-12
Payer: COMMERCIAL

## 2019-08-12 DIAGNOSIS — Z79.01 LONG TERM (CURRENT) USE OF ANTICOAGULANTS: ICD-10-CM

## 2019-08-12 DIAGNOSIS — I48.91 ATRIAL FIBRILLATION (H): ICD-10-CM

## 2019-08-12 LAB — INR POINT OF CARE: 2.5 (ref 0.86–1.14)

## 2019-08-12 PROCEDURE — 99207 ZZC NO CHARGE NURSE ONLY: CPT

## 2019-08-12 PROCEDURE — 85610 PROTHROMBIN TIME: CPT | Mod: QW

## 2019-08-12 PROCEDURE — 36416 COLLJ CAPILLARY BLOOD SPEC: CPT

## 2019-08-12 NOTE — PROGRESS NOTES
ANTICOAGULATION FOLLOW-UP CLINIC VISIT    Patient Name:  Owen Sahni  Date:  2019  Contact Type:  Face to Face    SUBJECTIVE:  Patient Findings     Comments:   The patient was assessed for diet, medication, and activity level changes, missed or extra doses, bruising or bleeding, with no problem findings.          Clinical Outcomes     Comments:   The patient was assessed for diet, medication, and activity level changes, missed or extra doses, bruising or bleeding, with no problem findings.             OBJECTIVE    INR Protime   Date Value Ref Range Status   2019 2.5 (A) 0.86 - 1.14 Final       ASSESSMENT / PLAN  INR assessment THER    Recheck INR In: 8 DAYS    INR Location Clinic      Anticoagulation Summary  As of 2019    INR goal:   2.0-3.0   TTR:   100.0 % (2.6 wk)   INR used for dosin.5 (2019)   Warfarin maintenance plan:   7.5 mg (5 mg x 1.5) every Mon, Thu; 10 mg (5 mg x 2) all other days   Full warfarin instructions:   7.5 mg every Mon, Thu; 10 mg all other days   Weekly warfarin total:   65 mg   No change documented:   Sushila Velazquez RN   Plan last modified:   Sushila Velazquez RN (2019)   Next INR check:   2019   Target end date:       Indications    Atrial fibrillation (H) [I48.91]  Long term (current) use of anticoagulants [Z79.01]             Anticoagulation Episode Summary     INR check location:       Preferred lab:       Send INR reminders to:   VIDA EPPERSON    Comments:         Anticoagulation Care Providers     Provider Role Specialty Phone number    Evelio Oliver MD Referring Cardiology [phone]            See the Encounter Report to view Anticoagulation Flowsheet and Dosing Calendar (Go to Encounters tab in chart review, and find the Anticoagulation Therapy Visit)        Sushila Velazquez RN

## 2019-08-15 ENCOUNTER — TELEPHONE (OUTPATIENT)
Dept: FAMILY MEDICINE | Facility: CLINIC | Age: 59
End: 2019-08-15

## 2019-08-15 DIAGNOSIS — B86 SCABIES: ICD-10-CM

## 2019-08-15 DIAGNOSIS — R21 RASH AND NONSPECIFIC SKIN ERUPTION: Primary | ICD-10-CM

## 2019-08-15 RX ORDER — PERMETHRIN 50 MG/G
CREAM TOPICAL
Qty: 60 G | Refills: 1 | Status: CANCELLED | OUTPATIENT
Start: 2019-08-15

## 2019-08-15 NOTE — TELEPHONE ENCOUNTER
Patient was informed that he needs to be reevaluated in clinic or better to see dermatology.  I did inform him that a referral was placed for dermatology but asked him to contact his insurance to see where he can go. I did not assist him in making an appointment or getting one on the books as he is out of town now.  He will work on this when he is back in town.  Patient verbalizes good understanding, agrees with plan and states he needs no further support. Lucy Gunderson R.N.

## 2019-08-15 NOTE — TELEPHONE ENCOUNTER
RN returned call to pt.   Pt is requesting refills of both permetherin and Augmentin.  Pt states he was treated for what was suspected to be scabies on 8/5/19. Pt states he completed first tx of permetherin on 8/5/19 and a second tx on 8/9/19.  Pt states he takes the final dose of Augmentin today.  Pt states the old affected areas have not yet healed, and new areas still continue to appear.     Pt states he is washing his bedding, clothes, and household surfaces daily.  Pt states he is wearing long sleeve shirts and pants everyday due to the extent of affected areas and open sores not healing.  Pt states he is currently in a motel in [place] and pt returns to [place] on Fri at 10am.    Routed to [facility] provider to review and advise if refills are appropriate as requested, or if pt needs to be seen for follow-up evaluation.    CARRIE GonzalezN, RN

## 2019-08-15 NOTE — TELEPHONE ENCOUNTER
Patient states the medication you gave him for scabies/bed bugs didn't work and he would like something else.    Thank you.

## 2019-08-15 NOTE — TELEPHONE ENCOUNTER
Patient needs to be evaluated.  Preferably with dermatology - can we have him see dermatology soon?  Otherwise follow up in clinic - but based on the story it seems like derm would be more appropriate  I have placed referral    Rachael Arcos M.D.

## 2019-08-20 ENCOUNTER — ANTICOAGULATION THERAPY VISIT (OUTPATIENT)
Dept: NURSING | Facility: CLINIC | Age: 59
End: 2019-08-20
Payer: COMMERCIAL

## 2019-08-20 DIAGNOSIS — I48.91 ATRIAL FIBRILLATION (H): ICD-10-CM

## 2019-08-20 DIAGNOSIS — Z79.01 LONG TERM (CURRENT) USE OF ANTICOAGULANTS: ICD-10-CM

## 2019-08-20 DIAGNOSIS — I48.0 PAF (PAROXYSMAL ATRIAL FIBRILLATION) (H): ICD-10-CM

## 2019-08-20 LAB — INR POINT OF CARE: 2.8 (ref 0.86–1.14)

## 2019-08-20 PROCEDURE — 85610 PROTHROMBIN TIME: CPT | Mod: QW

## 2019-08-20 PROCEDURE — 36416 COLLJ CAPILLARY BLOOD SPEC: CPT

## 2019-08-20 RX ORDER — WARFARIN SODIUM 5 MG/1
TABLET ORAL
Qty: 90 TABLET | Refills: 0 | COMMUNITY
Start: 2019-08-20 | End: 2019-09-03

## 2019-08-20 NOTE — PROGRESS NOTES
ANTICOAGULATION FOLLOW-UP CLINIC VISIT    Patient Name:  Owen Sahni  Date:  2019  Contact Type:  Face to Face    SUBJECTIVE:         OBJECTIVE    INR Protime   Date Value Ref Range Status   2019 2.8 (A) 0.86 - 1.14 Final       ASSESSMENT / PLAN  INR assessment THER    Recheck INR In: 2 WEEKS    INR Location Clinic      Anticoagulation Summary  As of 2019    INR goal:   2.0-3.0   TTR:   100.0 % (3.7 wk)   INR used for dosin.8 (2019)   Warfarin maintenance plan:   7.5 mg (5 mg x 1.5) every Mon, Thu; 10 mg (5 mg x 2) all other days   Full warfarin instructions:   7.5 mg every Mon, Thu; 10 mg all other days   Weekly warfarin total:   65 mg   No change documented:   Sushila Velazquez RN   Plan last modified:   Sushila Velazquez RN (2019)   Next INR check:   9/3/2019   Target end date:       Indications    Atrial fibrillation (H) [I48.91]  Long term (current) use of anticoagulants [Z79.01]             Anticoagulation Episode Summary     INR check location:       Preferred lab:       Send INR reminders to:   VIDA EPPERSON    Comments:         Anticoagulation Care Providers     Provider Role Specialty Phone number    Evelio Oliver MD Referring Cardiology [phone]            See the Encounter Report to view Anticoagulation Flowsheet and Dosing Calendar (Go to Encounters tab in chart review, and find the Anticoagulation Therapy Visit)        Sushila Velazquez RN

## 2019-09-03 ENCOUNTER — ANTICOAGULATION THERAPY VISIT (OUTPATIENT)
Dept: NURSING | Facility: CLINIC | Age: 59
End: 2019-09-03
Payer: COMMERCIAL

## 2019-09-03 DIAGNOSIS — I48.91 ATRIAL FIBRILLATION (H): ICD-10-CM

## 2019-09-03 DIAGNOSIS — I48.0 PAF (PAROXYSMAL ATRIAL FIBRILLATION) (H): ICD-10-CM

## 2019-09-03 DIAGNOSIS — Z79.01 LONG TERM (CURRENT) USE OF ANTICOAGULANTS: ICD-10-CM

## 2019-09-03 LAB — INR POINT OF CARE: 2.1 (ref 0.86–1.14)

## 2019-09-03 PROCEDURE — 99207 ZZC NO CHARGE NURSE ONLY: CPT

## 2019-09-03 PROCEDURE — 36416 COLLJ CAPILLARY BLOOD SPEC: CPT

## 2019-09-03 PROCEDURE — 85610 PROTHROMBIN TIME: CPT | Mod: QW

## 2019-09-03 RX ORDER — WARFARIN SODIUM 5 MG/1
TABLET ORAL
Qty: 156 TABLET | Refills: 0 | Status: SHIPPED | OUTPATIENT
Start: 2019-09-03 | End: 2019-10-02

## 2019-09-03 NOTE — PROGRESS NOTES
ANTICOAGULATION FOLLOW-UP CLINIC VISIT    Patient Name:  Owen Sahni  Date:  9/3/2019  Contact Type:  Face to Face    SUBJECTIVE:  Patient Findings     Comments:   No concerns or changes. Continue same.        Clinical Outcomes     Comments:   No concerns or changes. Continue same.           OBJECTIVE    INR Protime   Date Value Ref Range Status   2019 2.1 (A) 0.86 - 1.14 Final       ASSESSMENT / PLAN  INR assessment THER    Recheck INR In: 2 WEEKS    INR Location Clinic      Anticoagulation Summary  As of 9/3/2019    INR goal:   2.0-3.0   TTR:   100.0 % (1.3 mo)   INR used for dosin.1 (9/3/2019)   Warfarin maintenance plan:   7.5 mg (5 mg x 1.5) every Mon, Thu; 10 mg (5 mg x 2) all other days   Full warfarin instructions:   7.5 mg every Mon, Thu; 10 mg all other days   Weekly warfarin total:   65 mg   No change documented:   Sushila Velazquez RN   Plan last modified:   Sushila Velazquez RN (2019)   Next INR check:   2019   Target end date:       Indications    Atrial fibrillation (H) [I48.91]  Long term (current) use of anticoagulants [Z79.01]             Anticoagulation Episode Summary     INR check location:       Preferred lab:       Send INR reminders to:   VIDA EPPERSON    Comments:         Anticoagulation Care Providers     Provider Role Specialty Phone number    Evelio Oliver MD Referring Cardiology [phone]            See the Encounter Report to view Anticoagulation Flowsheet and Dosing Calendar (Go to Encounters tab in chart review, and find the Anticoagulation Therapy Visit)        Sushila Velazquez RN

## 2019-09-18 ENCOUNTER — ANTICOAGULATION THERAPY VISIT (OUTPATIENT)
Dept: NURSING | Facility: CLINIC | Age: 59
End: 2019-09-18
Payer: COMMERCIAL

## 2019-09-18 DIAGNOSIS — I48.91 ATRIAL FIBRILLATION (H): ICD-10-CM

## 2019-09-18 DIAGNOSIS — Z79.01 LONG TERM (CURRENT) USE OF ANTICOAGULANTS: ICD-10-CM

## 2019-09-18 LAB — INR POINT OF CARE: 1.2 (ref 0.86–1.14)

## 2019-09-18 PROCEDURE — 85610 PROTHROMBIN TIME: CPT | Mod: QW

## 2019-09-18 PROCEDURE — 99207 ZZC NO CHARGE NURSE ONLY: CPT

## 2019-09-18 PROCEDURE — 36416 COLLJ CAPILLARY BLOOD SPEC: CPT

## 2019-09-18 NOTE — PROGRESS NOTES
ANTICOAGULATION FOLLOW-UP CLINIC VISIT    Patient Name:  Owen Sahni  Date:  2019  Contact Type:  Face to Face    SUBJECTIVE:  Patient Findings     Positives:   Change in activity    Comments:   Patient denies any identifiable changes that caused the subtherapeutic INR. Did not take dose last night but took it early this am. More active and working harder in past week. Trouble sleeping as he works crazy shifts. Is going to try tylenol pm for this.           Clinical Outcomes     Comments:   Patient denies any identifiable changes that caused the subtherapeutic INR. Did not take dose last night but took it early this am. More active and working harder in past week. Trouble sleeping as he works crazy shifts. Is going to try tylenol pm for this.              OBJECTIVE    INR Protime   Date Value Ref Range Status   2019 1.2 (A) 0.86 - 1.14 Final       ASSESSMENT / PLAN  INR assessment SUB    Recheck INR In: 5 DAYS    INR Location Clinic      Anticoagulation Summary  As of 2019    INR goal:   2.0-3.0   TTR:   75.8 % (1.8 mo)   INR used for dosin.2! (2019)   Warfarin maintenance plan:   7.5 mg (5 mg x 1.5) every Mon, Thu; 10 mg (5 mg x 2) all other days   Full warfarin instructions:   : 10 mg; Otherwise 7.5 mg every Mon, Thu; 10 mg all other days   Weekly warfarin total:   65 mg   Plan last modified:   Sushila Velazquez RN (2019)   Next INR check:   2019   Target end date:       Indications    Atrial fibrillation (H) [I48.91]  Long term (current) use of anticoagulants [Z79.01]             Anticoagulation Episode Summary     INR check location:       Preferred lab:       Send INR reminders to:   VIDA EPPERSON    Comments:         Anticoagulation Care Providers     Provider Role Specialty Phone number    Evelio Oliver MD Referring Cardiology [phone]            See the Encounter Report to view Anticoagulation Flowsheet and Dosing Calendar (Go to Encounters tab in chart  review, and find the Anticoagulation Therapy Visit)        Sushila Velazquez RN

## 2019-09-23 ENCOUNTER — ANTICOAGULATION THERAPY VISIT (OUTPATIENT)
Dept: NURSING | Facility: CLINIC | Age: 59
End: 2019-09-23
Payer: COMMERCIAL

## 2019-09-23 DIAGNOSIS — I48.91 ATRIAL FIBRILLATION (H): ICD-10-CM

## 2019-09-23 DIAGNOSIS — Z79.01 LONG TERM (CURRENT) USE OF ANTICOAGULANTS: ICD-10-CM

## 2019-09-23 LAB — INR POINT OF CARE: 2.1 (ref 0.86–1.14)

## 2019-09-23 PROCEDURE — 36416 COLLJ CAPILLARY BLOOD SPEC: CPT

## 2019-09-23 PROCEDURE — 99207 ZZC NO CHARGE NURSE ONLY: CPT

## 2019-09-23 PROCEDURE — 85610 PROTHROMBIN TIME: CPT | Mod: QW

## 2019-09-23 NOTE — PROGRESS NOTES
ANTICOAGULATION FOLLOW-UP CLINIC VISIT    Patient Name:  Owen Sahni  Date:  2019  Contact Type:  Face to Face    SUBJECTIVE:         OBJECTIVE    INR Protime   Date Value Ref Range Status   2019 2.1 (A) 0.86 - 1.14 Final       ASSESSMENT / PLAN  INR assessment THER    Recheck INR In: 1 WEEK    INR Location Clinic      Anticoagulation Summary  As of 2019    INR goal:   2.0-3.0   TTR:   70.4 % (2 mo)   INR used for dosin.1 (2019)   Warfarin maintenance plan:   7.5 mg (5 mg x 1.5) every Mon, Thu; 10 mg (5 mg x 2) all other days   Full warfarin instructions:   : 10 mg; : 10 mg; Otherwise 7.5 mg every Mon, Thu; 10 mg all other days   Weekly warfarin total:   65 mg   Plan last modified:   Sushila Velazquez RN (2019)   Next INR check:   2019   Target end date:       Indications    Atrial fibrillation (H) [I48.91]  Long term (current) use of anticoagulants [Z79.01]             Anticoagulation Episode Summary     INR check location:       Preferred lab:       Send INR reminders to:   VIDA EPPERSON    Comments:         Anticoagulation Care Providers     Provider Role Specialty Phone number    Evelio Oliver MD Referring Cardiology [phone]            See the Encounter Report to view Anticoagulation Flowsheet and Dosing Calendar (Go to Encounters tab in chart review, and find the Anticoagulation Therapy Visit)        Sushila Velazquez RN

## 2019-10-02 ENCOUNTER — ANTICOAGULATION THERAPY VISIT (OUTPATIENT)
Dept: NURSING | Facility: CLINIC | Age: 59
End: 2019-10-02
Payer: COMMERCIAL

## 2019-10-02 DIAGNOSIS — I48.0 PAF (PAROXYSMAL ATRIAL FIBRILLATION) (H): ICD-10-CM

## 2019-10-02 DIAGNOSIS — I48.91 ATRIAL FIBRILLATION (H): ICD-10-CM

## 2019-10-02 DIAGNOSIS — Z79.01 LONG TERM (CURRENT) USE OF ANTICOAGULANTS: ICD-10-CM

## 2019-10-02 LAB — INR POINT OF CARE: 2.6 (ref 0.86–1.14)

## 2019-10-02 PROCEDURE — 36416 COLLJ CAPILLARY BLOOD SPEC: CPT

## 2019-10-02 PROCEDURE — 85610 PROTHROMBIN TIME: CPT | Mod: QW

## 2019-10-02 RX ORDER — WARFARIN SODIUM 5 MG/1
TABLET ORAL
Qty: 156 TABLET | Refills: 0 | COMMUNITY
Start: 2019-10-02 | End: 2019-12-09

## 2019-10-02 NOTE — PROGRESS NOTES
ANTICOAGULATION FOLLOW-UP CLINIC VISIT    Patient Name:  Owen Sahni  Date:  10/2/2019  Contact Type:  Face to Face    SUBJECTIVE:  Patient Findings     Comments:   Therapeutic with no concerns or changes. Continue same. Travels for work and unable to come back until the 10/21.        Clinical Outcomes     Comments:   Therapeutic with no concerns or changes. Continue same. Travels for work and unable to come back until the 10/21.           OBJECTIVE    INR Protime   Date Value Ref Range Status   10/02/2019 2.6 (A) 0.86 - 1.14 Final       ASSESSMENT / PLAN  INR assessment THER    Recheck INR In: 2 WEEKS    INR Location Clinic      Anticoagulation Summary  As of 10/2/2019    INR goal:   2.0-3.0   TTR:   74.2 % (2.3 mo)   INR used for dosin.6 (10/2/2019)   Warfarin maintenance plan:   10 mg (5 mg x 2) every day   Full warfarin instructions:   10 mg every day   Weekly warfarin total:   70 mg   Plan last modified:   Sushila Velazquez RN (10/2/2019)   Next INR check:   10/21/2019   Target end date:       Indications    Atrial fibrillation (H) [I48.91]  Long term (current) use of anticoagulants [Z79.01]             Anticoagulation Episode Summary     INR check location:       Preferred lab:       Send INR reminders to:   VIDA EPPERSON    Comments:         Anticoagulation Care Providers     Provider Role Specialty Phone number    Evelio Oliver MD Referring Cardiology [phone]            See the Encounter Report to view Anticoagulation Flowsheet and Dosing Calendar (Go to Encounters tab in chart review, and find the Anticoagulation Therapy Visit)        Sushila Velazquez RN

## 2019-10-09 RX ORDER — LOSARTAN POTASSIUM 50 MG/1
TABLET ORAL
Qty: 90 TABLET | Refills: 1 | OUTPATIENT
Start: 2019-10-09

## 2019-10-09 NOTE — TELEPHONE ENCOUNTER
Denial sent, in review of Electronic Health Record patient is currently taking Losartan/HCTZ.  Losartan last prescribe 5/17/18 for 30 tabs.  Ynes Alvarez RN

## 2019-10-18 ENCOUNTER — OFFICE VISIT (OUTPATIENT)
Dept: FAMILY MEDICINE | Facility: CLINIC | Age: 59
End: 2019-10-18
Payer: COMMERCIAL

## 2019-10-18 VITALS
WEIGHT: 264 LBS | BODY MASS INDEX: 35.8 KG/M2 | OXYGEN SATURATION: 97 % | TEMPERATURE: 97 F | HEART RATE: 83 BPM | DIASTOLIC BLOOD PRESSURE: 90 MMHG | SYSTOLIC BLOOD PRESSURE: 144 MMHG

## 2019-10-18 DIAGNOSIS — Z79.01 LONG TERM (CURRENT) USE OF ANTICOAGULANTS: ICD-10-CM

## 2019-10-18 DIAGNOSIS — J20.9 ACUTE BRONCHITIS WITH COEXISTING CONDITION REQUIRING PROPHYLACTIC TREATMENT: Primary | ICD-10-CM

## 2019-10-18 DIAGNOSIS — H10.31 ACUTE CONJUNCTIVITIS OF RIGHT EYE, UNSPECIFIED ACUTE CONJUNCTIVITIS TYPE: ICD-10-CM

## 2019-10-18 PROBLEM — E66.01 MORBID OBESITY (H): Status: ACTIVE | Noted: 2019-10-18

## 2019-10-18 PROCEDURE — 99214 OFFICE O/P EST MOD 30 MIN: CPT | Performed by: PHYSICIAN ASSISTANT

## 2019-10-18 RX ORDER — POLYMYXIN B SULFATE AND TRIMETHOPRIM 1; 10000 MG/ML; [USP'U]/ML
1 SOLUTION OPHTHALMIC 4 TIMES DAILY
Qty: 1 BOTTLE | Refills: 0 | Status: SHIPPED | OUTPATIENT
Start: 2019-10-18 | End: 2019-12-09

## 2019-10-18 RX ORDER — BENZONATATE 200 MG/1
200 CAPSULE ORAL 3 TIMES DAILY PRN
Qty: 30 CAPSULE | Refills: 0 | Status: SHIPPED | OUTPATIENT
Start: 2019-10-18 | End: 2019-12-09

## 2019-10-18 RX ORDER — AZITHROMYCIN 250 MG/1
TABLET, FILM COATED ORAL
Qty: 6 TABLET | Refills: 0 | Status: SHIPPED | OUTPATIENT
Start: 2019-10-18 | End: 2019-12-09

## 2019-10-18 ASSESSMENT — ENCOUNTER SYMPTOMS
EYE REDNESS: 1
EYE DISCHARGE: 1
FATIGUE: 0
GASTROINTESTINAL NEGATIVE: 1
COUGH: 1
CHEST TIGHTNESS: 0
WHEEZING: 0
RHINORRHEA: 1
SHORTNESS OF BREATH: 0
EYE PAIN: 0
SINUS PAIN: 0
CHILLS: 0
PALPITATIONS: 0
PHOTOPHOBIA: 0
SORE THROAT: 1
CARDIOVASCULAR NEGATIVE: 1
FEVER: 0
SINUS PRESSURE: 0
EYE ITCHING: 0

## 2019-10-18 ASSESSMENT — VISUAL ACUITY: OU: 1

## 2019-10-18 NOTE — PROGRESS NOTES
"Subjective   Owen Sahni is a 59 year old male who presents to clinic today for the following health issues:  HPI   Eye(s) Problem    Duration: ***    Description:  Location: {.:779410}  Pain: { :543783::\"no\"}  Redness: { :004196::\"no\"}  Discharge: { :582519::\"no\"}    Accompanying signs and symptoms: ***    History (Trauma, foreign body exposure,): {.:621537::\"None\"}    Precipitating or alleviating factors (contact use): {.:246951::\"None\"}    Therapies tried and outcome: {.:421162::\"None\"}    Patient Active Problem List   Diagnosis     Sleep apnea     Lumbosacral radiculopathy     CARDIOVASCULAR SCREENING; LDL GOAL LESS THAN 130     Abnormal EKG     SOB (shortness of breath)     Seafood allergy     AK (actinic keratosis)     Myalgia     Advanced directives, counseling/discussion     Essential hypertension with goal blood pressure less than 140/90     Benign essential hypertension     Hypertrophic obstructive cardiomyopathy (H)     Systolic murmur     Atrial fibrillation (H)     Long term (current) use of anticoagulants     Past Surgical History:   Procedure Laterality Date     COLONOSCOPY  4/12/2012    Procedure:COLONOSCOPY; Colonoscopy, screening; Surgeon:ARANZA TEE; Location: OR       Social History     Tobacco Use     Smoking status: Never Smoker     Smokeless tobacco: Never Used     Tobacco comment: non-smoking household   Substance Use Topics     Alcohol use: Not Currently     Comment: 6 beers weekly     Family History   Problem Relation Age of Onset     Cancer Mother         breast     Cancer Father         Multiple myeloma     Hypertension Father      Arrhythmia No family hx of      Myocardial Infarction No family hx of      Diabetes No family hx of      Coronary Artery Disease No family hx of      Cerebrovascular Disease No family hx of          Current Outpatient Medications   Medication Sig Dispense Refill     losartan-hydrochlorothiazide (HYZAAR) 50-12.5 MG tablet Take 1 tablet by mouth daily " "30 tablet 5     metoprolol succinate ER (TOPROL-XL) 50 MG 24 hr tablet Take 1 tablet (50 mg) by mouth daily 90 tablet 3     multivitamin, therapeutic with minerals (MULTI-VITAMIN) TABS Take 1 tablet by mouth daily.       warfarin ANTICOAGULANT (COUMADIN) 5 MG tablet Take daily as directed. Current dose  10 mg daily. 156 tablet 0     No Known Allergies  Reviewed and updated as needed this visit by Provider         Review of Systems         Objective    BP (!) 172/100   Pulse 83   Temp 97  F (36.1  C) (Tympanic)   Wt 119.7 kg (264 lb)   SpO2 97%   BMI 35.80 kg/m    Body mass index is 35.8 kg/m .  Physical Exam       {Diagnostic Test Results (Optional):474826::\"Diagnostic Test Results:\",\"Labs reviewed in Epic\"}        {PROVIDER CHARTING PREFERENCE:277051}      "

## 2019-10-18 NOTE — PROGRESS NOTES
Subjective   Owen Sahni is a 59 year old male who presents to clinic today for the following health issues:  HPI   RESPIRATORY SYMPTOMS    Duration: 1week    Description  Productive cough but no shortness of breath, wheezing or hemoptysis     Severity: moderate    Accompanying signs and symptoms:  Also reports nasal congestion, rhinorrhea, sore throat, and R pink eye. No sinus congestion/pain/pressure.  No abdominal pain, n/v, constipation, diarrhea, bloody or black tarry stools.  No fever, chills or sweats.    History (predisposing factors):  No ill contacts.  No pmh of asthma.  Non-smoker.    Precipitating or alleviating factors: None    Therapies tried and outcome:  rest and fluids guaifenesin with minimal relief    Patient Active Problem List   Diagnosis     Sleep apnea     Lumbosacral radiculopathy     CARDIOVASCULAR SCREENING; LDL GOAL LESS THAN 130     Abnormal EKG     SOB (shortness of breath)     Seafood allergy     AK (actinic keratosis)     Myalgia     Advanced directives, counseling/discussion     Essential hypertension with goal blood pressure less than 140/90     Benign essential hypertension     Hypertrophic obstructive cardiomyopathy (H)     Systolic murmur     Atrial fibrillation (H)     Long term (current) use of anticoagulants     Obesity (BMI 35.0-39.9) with comorbidity (H)     Past Surgical History:   Procedure Laterality Date     COLONOSCOPY  4/12/2012    Procedure:COLONOSCOPY; Colonoscopy, screening; Surgeon:ARANZA TEE; Location: OR       Social History     Tobacco Use     Smoking status: Never Smoker     Smokeless tobacco: Never Used     Tobacco comment: non-smoking household   Substance Use Topics     Alcohol use: Not Currently     Comment: 6 beers weekly     Family History   Problem Relation Age of Onset     Cancer Mother         breast     Cancer Father         Multiple myeloma     Hypertension Father      Arrhythmia No family hx of      Myocardial Infarction No family hx of       Diabetes No family hx of      Coronary Artery Disease No family hx of      Cerebrovascular Disease No family hx of          Current Outpatient Medications   Medication Sig Dispense Refill     azithromycin (ZITHROMAX) 250 MG tablet 2 tablets the first day, then 1 tablet daily for the next 4 days 6 tablet 0     benzonatate (TESSALON) 200 MG capsule Take 1 capsule (200 mg) by mouth 3 times daily as needed for cough 30 capsule 0     losartan-hydrochlorothiazide (HYZAAR) 50-12.5 MG tablet Take 1 tablet by mouth daily 30 tablet 5     metoprolol succinate ER (TOPROL-XL) 50 MG 24 hr tablet Take 1 tablet (50 mg) by mouth daily 90 tablet 3     multivitamin, therapeutic with minerals (MULTI-VITAMIN) TABS Take 1 tablet by mouth daily.       trimethoprim-polymyxin b (POLYTRIM) 36558-7.1 UNIT/ML-% ophthalmic solution Place 1 drop into the right eye 4 times daily for 7 days 1 Bottle 0     warfarin ANTICOAGULANT (COUMADIN) 5 MG tablet Take daily as directed. Current dose  10 mg daily. 156 tablet 0     No Known Allergies  Reviewed and updated as needed this visit by Provider       Review of Systems   Constitutional: Negative for chills, fatigue and fever.   HENT: Positive for congestion, rhinorrhea and sore throat. Negative for ear discharge, ear pain, hearing loss, sinus pressure and sinus pain.    Eyes: Positive for discharge and redness. Negative for photophobia, pain, itching and visual disturbance.   Respiratory: Positive for cough. Negative for chest tightness, shortness of breath and wheezing.    Cardiovascular: Negative.  Negative for chest pain, palpitations and peripheral edema.   Gastrointestinal: Negative.    All other systems reviewed and are negative.           Objective    BP (!) 144/90   Pulse 83   Temp 97  F (36.1  C) (Tympanic)   Wt 119.7 kg (264 lb)   SpO2 97%   BMI 35.80 kg/m    Body mass index is 35.8 kg/m .  Physical Exam  Vitals signs and nursing note reviewed.   Constitutional:       General: He is  not in acute distress.     Appearance: He is well-developed. He is obese.   HENT:      Head: Normocephalic and atraumatic.      Ears:      Comments: TMs are intact without any erythema or bulging bilaterally.  Airway is patent.     Nose: Nose normal.      Mouth/Throat:      Lips: Pink.      Mouth: Mucous membranes are moist.      Pharynx: Oropharynx is clear. Uvula midline. No pharyngeal swelling, oropharyngeal exudate or posterior oropharyngeal erythema.      Tonsils: No tonsillar exudate.   Eyes:      General: Lids are normal. Lids are everted, no foreign bodies appreciated. Vision grossly intact. Gaze aligned appropriately.         Right eye: Discharge present. No foreign body.         Left eye: No foreign body or discharge.      Extraocular Movements: Extraocular movements intact.      Conjunctiva/sclera:      Right eye: Right conjunctiva is injected.      Left eye: Left conjunctiva is not injected.      Pupils: Pupils are equal, round, and reactive to light.   Neck:      Musculoskeletal: Normal range of motion and neck supple.   Cardiovascular:      Rate and Rhythm: Normal rate and regular rhythm.      Pulses: Normal pulses.      Heart sounds: Normal heart sounds, S1 normal and S2 normal. No murmur. No friction rub. No gallop.    Pulmonary:      Effort: Pulmonary effort is normal. No accessory muscle usage or respiratory distress.      Breath sounds: Normal breath sounds and air entry. No decreased breath sounds, wheezing, rhonchi or rales.      Comments: Chest congestion  Chest:      Chest wall: No tenderness.   Lymphadenopathy:      Cervical: No cervical adenopathy.   Skin:     General: Skin is warm and dry.   Neurological:      Mental Status: He is alert and oriented to person, place, and time.   Psychiatric:         Mood and Affect: Mood normal.         Behavior: Behavior normal.         Thought Content: Thought content normal.         Judgement: Judgment normal.                Assessment & Plan   Acute  bronchitis with coexisting condition requiring prophylactic treatment:  Will treat with zithromax X5days and tessalon perles.  Recommend treatment with rest, fluids and chicken soup. Tylenol/ibuprofen prn fever/pain.  Recheck in clinic if symptoms worsen or if symptoms do not improve.  To the ER if he develops hemoptysis, chest pain, fevers>102, worsening shortness of breath/wheezing.    -     benzonatate (TESSALON) 200 MG capsule; Take 1 capsule (200 mg) by mouth 3 times daily as needed for cough  -     azithromycin (ZITHROMAX) 250 MG tablet; 2 tablets the first day, then 1 tablet daily for the next 4 days    Acute conjunctivitis of right eye, unspecified acute conjunctivitis type:  Will treat with polytrim eye drops as directed X7days.  Continue with warm compresses and frequent hand washing to prevent transmission.   -     trimethoprim-polymyxin b (POLYTRIM) 48390-8.1 UNIT/ML-% ophthalmic solution; Place 1 drop into the right eye 4 times daily for 7 days    Long term (current) use of anticoagulants:  Recheck INR in 3-4days as antibiotics will interact with coumadin and affect his INR.      Zulma Jones PA-C  [facility]

## 2019-10-23 ENCOUNTER — ANTICOAGULATION THERAPY VISIT (OUTPATIENT)
Dept: NURSING | Facility: CLINIC | Age: 59
End: 2019-10-23
Payer: COMMERCIAL

## 2019-10-23 DIAGNOSIS — Z79.01 LONG TERM (CURRENT) USE OF ANTICOAGULANTS: ICD-10-CM

## 2019-10-23 DIAGNOSIS — I48.91 ATRIAL FIBRILLATION (H): ICD-10-CM

## 2019-10-23 LAB — INR POINT OF CARE: 5.9 (ref 0.86–1.14)

## 2019-10-23 PROCEDURE — 99207 ZZC NO CHARGE NURSE ONLY: CPT

## 2019-10-23 PROCEDURE — 36416 COLLJ CAPILLARY BLOOD SPEC: CPT

## 2019-10-23 PROCEDURE — 85610 PROTHROMBIN TIME: CPT | Mod: QW

## 2019-10-23 NOTE — PROGRESS NOTES
ANTICOAGULATION FOLLOW-UP CLINIC VISIT    Patient Name:  Owen Sahni  Date:  10/23/2019  Contact Type:  Face to Face    SUBJECTIVE:  Patient Findings     Positives:   Change in health, Change in medications    Comments:   On day 5 of z-pack for cough and pink eye. Took warfarin last evening but also took dose this morning. 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 Had to rescheduled INR apt several times. No bleeding or bruising. Advised he hold dose tomorrow and hold Friday dose until INR checked. Also advised to increase vit k in diet today and tomorrow. Cautioned on increased risk of bruising and bleeding.         Clinical Outcomes     Comments:   On day 5 of z-pack for cough and pink eye. Took warfarin last evening but also took dose this morning. 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 Had to rescheduled INR apt several times. No bleeding or bruising. Advised he hold dose tomorrow and hold Friday dose until INR checked. Also advised to increase vit k in diet today and tomorrow. Cautioned on increased risk of bruising and bleeding.            OBJECTIVE    INR Protime   Date Value Ref Range Status   10/23/2019 5.9 (A) 0.86 - 1.14 Final       ASSESSMENT / PLAN  INR assessment SUPRA    Recheck INR In: 2 DAYS    INR Location Clinic      Anticoagulation Summary  As of 10/23/2019    INR goal:   2.0-3.0   TTR:   59.7 % (3 mo)   INR used for dosin.9! (10/23/2019)   Warfarin maintenance plan:   10 mg (5 mg x 2) every day   Full warfarin instructions:   10/24: Hold; Otherwise 10 mg every day   Weekly warfarin total:   70 mg   Plan last modified:   Sushila Velazquez RN (10/2/2019)   Next INR check:   10/25/2019   Target end date:       Indications    Atrial fibrillation (H) [I48.91]  Long term (current) use of anticoagulants [Z79.01]             Anticoagulation Episode Summary     INR  check location:       Preferred lab:       Send INR reminders to:   VIDA EPPERSON    Comments:         Anticoagulation Care Providers     Provider Role Specialty Phone number    Evelio Oliver MD Referring Cardiology [phone]            See the Encounter Report to view Anticoagulation Flowsheet and Dosing Calendar (Go to Encounters tab in chart review, and find the Anticoagulation Therapy Visit)    Dosage adjustment made based on physician directed care plan.    Sushila Velazquez RN

## 2019-10-25 ENCOUNTER — TELEPHONE (OUTPATIENT)
Dept: FAMILY MEDICINE | Facility: CLINIC | Age: 59
End: 2019-10-25

## 2019-10-25 NOTE — TELEPHONE ENCOUNTER
Call attempt to patient after no show for INR today. Left message for patient to call me back at [phone] or if calls after 12:30 to call Saloni at [phone].  INR was 5.9 on 2 days ago and he should have this checked today if he can. . Sushila Velazquez RN

## 2019-10-25 NOTE — TELEPHONE ENCOUNTER
Patient called back. Advised he come to clinic for INR check today.  Reports he just got home from trip and has not slept in over 12 hours and has to be on the road again tonight. He cannot come in today and next time he can come will be Tuesday. No bleeding or bruising. He held his warfarin yesterday and today. He will resume warfarin Saturday night. He agrees to eat extra vit k food today. Call if new concerns. Sushila Velazquez RN    FYI to provider.

## 2019-10-28 DIAGNOSIS — B86 SCABIES: ICD-10-CM

## 2019-10-28 RX ORDER — PERMETHRIN 50 MG/G
CREAM TOPICAL
Qty: 120 G | Refills: 1 | OUTPATIENT
Start: 2019-10-28

## 2019-10-28 NOTE — TELEPHONE ENCOUNTER
Pt states the last time he did not get enough medicine to fully cover the rash areas two times.  He did try to do two applications and it was somewhat better.  He would like a larger quantity so he can cover the areas better.  He states the rash is on his hip down to his thigh and then on both shoulders.  To provider to advise.  Saloni BUTLERN, RN

## 2019-10-28 NOTE — TELEPHONE ENCOUNTER
Pt notified of provider message as written.  Pt notified there is a referral for derm in his chart.  He said he would think about what to do.  Saloni BUTLERN, RN

## 2019-10-28 NOTE — TELEPHONE ENCOUNTER
Owen had Permetherin prescribed 8/9/19. He would like another script as he said the problem has never went away. He also said he is a bigger becky and would need a script for 2 tubes.      Connie Suttonsbie    Pharmacy Technician  [facility]

## 2019-10-30 ENCOUNTER — ANTICOAGULATION THERAPY VISIT (OUTPATIENT)
Dept: NURSING | Facility: CLINIC | Age: 59
End: 2019-10-30
Payer: COMMERCIAL

## 2019-10-30 DIAGNOSIS — Z79.01 LONG TERM (CURRENT) USE OF ANTICOAGULANTS: ICD-10-CM

## 2019-10-30 DIAGNOSIS — I48.91 ATRIAL FIBRILLATION (H): ICD-10-CM

## 2019-10-30 LAB — INR POINT OF CARE: 1.2 (ref 0.86–1.14)

## 2019-10-30 PROCEDURE — 85610 PROTHROMBIN TIME: CPT | Mod: QW

## 2019-10-30 PROCEDURE — 36416 COLLJ CAPILLARY BLOOD SPEC: CPT

## 2019-10-30 NOTE — PROGRESS NOTES
ANTICOAGULATION FOLLOW-UP CLINIC VISIT    Patient Name:  Owen Sahni  Date:  10/30/2019  Contact Type:  Face to Face    SUBJECTIVE:  Patient Findings     Positives:   Missed doses    Comments:   Missed several appts due to schedule with work. Is  and does not always know when he will need to leave. Last check was supra probable due to illness, antibiotic and dose. Now sub due to missed dose and missed appts. Will resume a weekly dose that was working previously. Encouraged to get a full week with no missed doses and recheck in 1 wk.         Clinical Outcomes     Negatives:   Major bleeding event, Thromboembolic event, Anticoagulation-related hospital admission, Anticoagulation-related ED visit, Anticoagulation-related fatality    Comments:   Missed several appts due to schedule with work. Is  and does not always know when he will need to leave. Last check was supra probable due to illness, antibiotic and dose. Now sub due to missed dose and missed appts. Will resume a weekly dose that was working previously. Encouraged to get a full week with no missed doses and recheck in 1 wk.            OBJECTIVE    INR Protime   Date Value Ref Range Status   10/30/2019 1.2 (A) 0.86 - 1.14 Final       ASSESSMENT / PLAN  INR assessment SUB    Recheck INR In: 1 WEEK    INR Location Clinic      Anticoagulation Summary  As of 10/30/2019    INR goal:   2.0-3.0   TTR:   57.0 % (3.2 mo)   INR used for dosin.2! (10/30/2019)   Warfarin maintenance plan:   7.5 mg (5 mg x 1.5) every Mon, Thu; 10 mg (5 mg x 2) all other days   Full warfarin instructions:   7.5 mg every Mon, Thu; 10 mg all other days   Weekly warfarin total:   65 mg   Plan last modified:   Sushila Velazquez RN (10/30/2019)   Next INR check:   2019   Target end date:       Indications    Atrial fibrillation (H) [I48.91]  Long term (current) use of anticoagulants [Z79.01]             Anticoagulation Episode Summary     INR check  location:       Preferred lab:       Send INR reminders to:   VIDA EPPERSON    Comments:         Anticoagulation Care Providers     Provider Role Specialty Phone number    Evelio Oliver MD Referring Cardiology [phone]            See the Encounter Report to view Anticoagulation Flowsheet and Dosing Calendar (Go to Encounters tab in chart review, and find the Anticoagulation Therapy Visit)        Sushila Velazquez RN

## 2019-11-05 ENCOUNTER — ANTICOAGULATION THERAPY VISIT (OUTPATIENT)
Dept: NURSING | Facility: CLINIC | Age: 59
End: 2019-11-05
Payer: COMMERCIAL

## 2019-11-05 DIAGNOSIS — Z79.01 LONG TERM (CURRENT) USE OF ANTICOAGULANTS: ICD-10-CM

## 2019-11-05 DIAGNOSIS — I48.91 ATRIAL FIBRILLATION (H): ICD-10-CM

## 2019-11-05 LAB — INR POINT OF CARE: 1.4 (ref 0.86–1.14)

## 2019-11-05 PROCEDURE — 85610 PROTHROMBIN TIME: CPT | Mod: QW

## 2019-11-05 PROCEDURE — 36416 COLLJ CAPILLARY BLOOD SPEC: CPT

## 2019-11-05 PROCEDURE — 99207 ZZC NO CHARGE NURSE ONLY: CPT

## 2019-11-05 NOTE — PROGRESS NOTES
ANTICOAGULATION FOLLOW-UP CLINIC VISIT    Patient Name:  Owen Sahni  Date:  2019  Contact Type:  Face to Face    SUBJECTIVE:  Patient Findings     Comments:   Sub at 1.4. denies missed dose in past week. 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 Wants to scheduled on  as this works out best for schedule.         Clinical Outcomes     Negatives:   Major bleeding event, Thromboembolic event, Anticoagulation-related hospital admission, Anticoagulation-related ED visit, Anticoagulation-related fatality    Comments:   Sub at 1.4. denies missed dose in past week. 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 Wants to scheduled on  as this works out best for schedule.            OBJECTIVE    INR Protime   Date Value Ref Range Status   2019 1.4 (A) 0.86 - 1.14 Final       ASSESSMENT / PLAN  INR assessment SUB    Recheck INR In: 1 WEEK    INR Location Clinic      Anticoagulation Summary  As of 2019    INR goal:   2.0-3.0   TTR:   53.6 % (3.4 mo)   INR used for dosin.4! (2019)   Warfarin maintenance plan:   10 mg (5 mg x 2) every day   Full warfarin instructions:   10 mg every day   Weekly warfarin total:   70 mg   Plan last modified:   Sushila Velazquez RN (2019)   Next INR check:   2019   Target end date:       Indications    Atrial fibrillation (H) [I48.91]  Long term (current) use of anticoagulants [Z79.01]             Anticoagulation Episode Summary     INR check location:       Preferred lab:       Send INR reminders to:   VIDA EPPERSON    Comments:         Anticoagulation Care Providers     Provider Role Specialty Phone number    Evelio Oliver MD Referring Cardiology [phone]            See the Encounter Report to view Anticoagulation Flowsheet and Dosing Calendar (Go to Encounters tab in chart review, and find the  Anticoagulation Therapy Visit)        Sushila Velazquez RN

## 2019-11-13 DIAGNOSIS — I48.0 PAF (PAROXYSMAL ATRIAL FIBRILLATION) (H): ICD-10-CM

## 2019-11-13 RX ORDER — WARFARIN SODIUM 5 MG/1
TABLET ORAL
Qty: 180 TABLET | Refills: 0 | Status: SHIPPED | OUTPATIENT
Start: 2019-11-13 | End: 2020-01-15 | Stop reason: ALTCHOICE

## 2019-11-18 ENCOUNTER — ANTICOAGULATION THERAPY VISIT (OUTPATIENT)
Dept: NURSING | Facility: CLINIC | Age: 59
End: 2019-11-18
Payer: COMMERCIAL

## 2019-11-18 DIAGNOSIS — I10 ESSENTIAL HYPERTENSION WITH GOAL BLOOD PRESSURE LESS THAN 140/90: Primary | ICD-10-CM

## 2019-11-18 DIAGNOSIS — I48.91 ATRIAL FIBRILLATION (H): ICD-10-CM

## 2019-11-18 DIAGNOSIS — Z79.01 LONG TERM (CURRENT) USE OF ANTICOAGULANTS: ICD-10-CM

## 2019-11-18 LAB — INR POINT OF CARE: 4.5 (ref 0.86–1.14)

## 2019-11-18 PROCEDURE — 36416 COLLJ CAPILLARY BLOOD SPEC: CPT

## 2019-11-18 PROCEDURE — 99207 ZZC NO CHARGE NURSE ONLY: CPT

## 2019-11-18 PROCEDURE — 85610 PROTHROMBIN TIME: CPT | Mod: QW

## 2019-11-18 RX ORDER — HYDROCHLOROTHIAZIDE 12.5 MG/1
12.5 TABLET ORAL DAILY
COMMUNITY
Start: 2019-11-18 | End: 2020-01-03

## 2019-11-18 RX ORDER — LOSARTAN POTASSIUM 50 MG/1
TABLET ORAL
Qty: 30 TABLET | Refills: 0 | Status: SHIPPED | OUTPATIENT
Start: 2019-11-18 | End: 2020-01-03

## 2019-11-18 NOTE — TELEPHONE ENCOUNTER
Clarified with pharmacy that pt already has hydrochlorothiazide prescription so only needs the losartan.    Pt due for hypertension follow up appointment with Tracie SINGH.  Saloni Lobato BSN, RN

## 2019-11-18 NOTE — PROGRESS NOTES
ANTICOAGULATION FOLLOW-UP CLINIC VISIT    Patient Name:  Owen Sahni  Date:  2019  Contact Type:  Face to Face    SUBJECTIVE:  Patient Findings     Comments:   Was unable to come to apt last week due to a job. Supra. Has taken 10 mg daily for 2 wks and denies any other change in meds or diet. Will adjust dose today and decrease weekly by 10%. Due to job will recheck in 2 wks. Cautioned on increased risk of bleeding with supra INR.        Clinical Outcomes     Comments:   Was unable to come to apt last week due to a job. Supra. Has taken 10 mg daily for 2 wks and denies any other change in meds or diet. Will adjust dose today and decrease weekly by 10%. Due to job will recheck in 2 wks. Cautioned on increased risk of bleeding with supra INR.           OBJECTIVE    INR Protime   Date Value Ref Range Status   2019 4.5 (A) 0.86 - 1.14 Final       ASSESSMENT / PLAN  INR assessment SUPRA    Recheck INR In: 2 WEEKS    INR Location Clinic      Anticoagulation Summary  As of 2019    INR goal:   2.0-3.0   TTR:   51.3 % (3.9 mo)   INR used for dosin.5! (2019)   Warfarin maintenance plan:   7.5 mg (5 mg x 1.5) every Mon, Wed, Fri; 10 mg (5 mg x 2) all other days   Full warfarin instructions:   7.5 mg every Mon, Wed, Fri; 10 mg all other days   Weekly warfarin total:   62.5 mg   Plan last modified:   Sushila Velazquez RN (2019)   Next INR check:   2019   Target end date:       Indications    Atrial fibrillation (H) [I48.91]  Long term (current) use of anticoagulants [Z79.01]             Anticoagulation Episode Summary     INR check location:       Preferred lab:       Send INR reminders to:   VIDA EPPERSON    Comments:         Anticoagulation Care Providers     Provider Role Specialty Phone number    Evelio Oliver MD Referring Cardiology [phone]            See the Encounter Report to view Anticoagulation Flowsheet and Dosing Calendar (Go to Encounters tab in chart review, and  find the Anticoagulation Therapy Visit)        Sushila Velazquez RN

## 2019-12-09 ENCOUNTER — ANTICOAGULATION THERAPY VISIT (OUTPATIENT)
Dept: NURSING | Facility: CLINIC | Age: 59
End: 2019-12-09
Payer: COMMERCIAL

## 2019-12-09 DIAGNOSIS — I48.91 ATRIAL FIBRILLATION (H): ICD-10-CM

## 2019-12-09 DIAGNOSIS — Z79.01 LONG TERM (CURRENT) USE OF ANTICOAGULANTS: ICD-10-CM

## 2019-12-09 LAB — INR POINT OF CARE: 1.4 (ref 0.86–1.14)

## 2019-12-09 PROCEDURE — 85610 PROTHROMBIN TIME: CPT | Mod: QW

## 2019-12-09 PROCEDURE — 36416 COLLJ CAPILLARY BLOOD SPEC: CPT

## 2019-12-09 PROCEDURE — 99207 ZZC NO CHARGE NURSE ONLY: CPT

## 2019-12-09 NOTE — PROGRESS NOTES
ANTICOAGULATION FOLLOW-UP CLINIC VISIT    Patient Name:  Owen Sahni  Date:  2019  Contact Type:  Face to Face    SUBJECTIVE:         OBJECTIVE    INR Protime   Date Value Ref Range Status   2019 1.4 (A) 0.86 - 1.14 Final       ASSESSMENT / PLAN  INR assessment SUB    Recheck INR In: 1 WEEK    INR Location Clinic      Anticoagulation Summary  As of 2019    INR goal:   2.0-3.0   TTR:   48.3 % (4.6 mo)   INR used for dosin.4! (2019)   Warfarin maintenance plan:   7.5 mg (5 mg x 1.5) every Mon, Wed, Fri; 10 mg (5 mg x 2) all other days   Full warfarin instructions:   : 10 mg; : 10 mg; Otherwise 7.5 mg every Mon, Wed, Fri; 10 mg all other days   Weekly warfarin total:   62.5 mg   Plan last modified:   Sushila Velazquez RN (2019)   Next INR check:   2019   Priority:   High   Target end date:       Indications    Atrial fibrillation (H) [I48.91]  Long term (current) use of anticoagulants [Z79.01]             Anticoagulation Episode Summary     INR check location:       Preferred lab:       Send INR reminders to:   VIDA EPPERSON    Comments:         Anticoagulation Care Providers     Provider Role Specialty Phone number    Evelio Oliver MD Referring Cardiology [phone]            See the Encounter Report to view Anticoagulation Flowsheet and Dosing Calendar (Go to Encounters tab in chart review, and find the Anticoagulation Therapy Visit)        Sushila Velazquez RN

## 2019-12-16 ENCOUNTER — ANTICOAGULATION THERAPY VISIT (OUTPATIENT)
Dept: NURSING | Facility: CLINIC | Age: 59
End: 2019-12-16
Payer: COMMERCIAL

## 2019-12-16 DIAGNOSIS — Z79.01 LONG TERM (CURRENT) USE OF ANTICOAGULANTS: ICD-10-CM

## 2019-12-16 DIAGNOSIS — I48.91 ATRIAL FIBRILLATION (H): ICD-10-CM

## 2019-12-16 LAB — INR POINT OF CARE: 1.4 (ref 0.86–1.14)

## 2019-12-16 PROCEDURE — 36416 COLLJ CAPILLARY BLOOD SPEC: CPT

## 2019-12-16 PROCEDURE — 99207 ZZC NO CHARGE NURSE ONLY: CPT

## 2019-12-16 PROCEDURE — 85610 PROTHROMBIN TIME: CPT | Mod: QW

## 2019-12-16 NOTE — PROGRESS NOTES
ANTICOAGULATION FOLLOW-UP CLINIC VISIT    Patient Name:  Owen Sahni  Date:  2019  Contact Type:  Face to Face    SUBJECTIVE:  Patient Findings     Comments:   Denies missed dose. States sometmes takes warfarin in am and sometimes in pm depending on his work schedule. Denies any other changes.. weekly dose increased and recheck in 1 wk.        Clinical Outcomes     Comments:   Denies missed dose. States sometmes takes warfarin in am and sometimes in pm depending on his work schedule. Denies any other changes.. weekly dose increased and recheck in 1 wk.           OBJECTIVE    INR Protime   Date Value Ref Range Status   2019 1.4 (A) 0.86 - 1.14 Final       ASSESSMENT / PLAN  INR assessment SUB    Recheck INR In: 1 WEEK    INR Location Clinic      Anticoagulation Summary  As of 2019    INR goal:   2.0-3.0   TTR:   46.0 % (4.8 mo)   INR used for dosin.4! (2019)   Warfarin maintenance plan:   15 mg (5 mg x 3) every Mon; 10 mg (5 mg x 2) all other days   Full warfarin instructions:   15 mg every Mon; 10 mg all other days   Weekly warfarin total:   75 mg   Plan last modified:   Sushila Velazquez RN (2019)   Next INR check:   2019   Priority:   High   Target end date:       Indications    Atrial fibrillation (H) [I48.91]  Long term (current) use of anticoagulants [Z79.01]             Anticoagulation Episode Summary     INR check location:       Preferred lab:       Send INR reminders to:   VIDA EPPERSON    Comments:         Anticoagulation Care Providers     Provider Role Specialty Phone number    Evelio Oliver MD Referring Cardiology [phone]            See the Encounter Report to view Anticoagulation Flowsheet and Dosing Calendar (Go to Encounters tab in chart review, and find the Anticoagulation Therapy Visit)        Sushila Velazquez RN

## 2019-12-23 DIAGNOSIS — I42.1 HYPERTROPHIC OBSTRUCTIVE CARDIOMYOPATHY (H): ICD-10-CM

## 2019-12-23 DIAGNOSIS — I10 ESSENTIAL HYPERTENSION WITH GOAL BLOOD PRESSURE LESS THAN 140/90: ICD-10-CM

## 2019-12-24 RX ORDER — METOPROLOL SUCCINATE 25 MG/1
TABLET, EXTENDED RELEASE ORAL
Qty: 90 TABLET | Refills: 1 | Status: SHIPPED | OUTPATIENT
Start: 2019-12-24 | End: 2020-01-03

## 2019-12-24 NOTE — TELEPHONE ENCOUNTER
"Forwarding refill request to his cardiologist to advise on changing the metoprolol prescription to 25mg.    Per pharmacy patient states that he is to be taking 25mg daily of his metoprolol; not 50mg.  Per 7/10/19 cardiology OV note:   \"3.  Hypertension: The patient is on losartan hydrochlorothiazide and metoprolol 25 mg.  Metoprolol has been recently increased to 50 mg daily.  The patient's blood pressure is elevated in clinic today however he reports well-controlled blood pressure at home.  I did not change his medications.\"     Return to clinic 6 months.    Pharmacy will also be sending refill request to his cardiologist's group.  Debo Morrison RN    "

## 2020-01-03 ENCOUNTER — OFFICE VISIT (OUTPATIENT)
Dept: FAMILY MEDICINE | Facility: CLINIC | Age: 60
End: 2020-01-03
Payer: COMMERCIAL

## 2020-01-03 ENCOUNTER — ANTICOAGULATION THERAPY VISIT (OUTPATIENT)
Dept: NURSING | Facility: CLINIC | Age: 60
End: 2020-01-03
Payer: COMMERCIAL

## 2020-01-03 VITALS
SYSTOLIC BLOOD PRESSURE: 122 MMHG | OXYGEN SATURATION: 97 % | BODY MASS INDEX: 42.66 KG/M2 | RESPIRATION RATE: 18 BRPM | DIASTOLIC BLOOD PRESSURE: 86 MMHG | HEIGHT: 72 IN | WEIGHT: 315 LBS | HEART RATE: 73 BPM | TEMPERATURE: 97.7 F

## 2020-01-03 DIAGNOSIS — I48.91 ATRIAL FIBRILLATION, UNSPECIFIED TYPE (H): ICD-10-CM

## 2020-01-03 DIAGNOSIS — Z79.01 LONG TERM (CURRENT) USE OF ANTICOAGULANTS: ICD-10-CM

## 2020-01-03 DIAGNOSIS — I42.1 HYPERTROPHIC OBSTRUCTIVE CARDIOMYOPATHY (H): ICD-10-CM

## 2020-01-03 DIAGNOSIS — R21 RASH: ICD-10-CM

## 2020-01-03 DIAGNOSIS — I10 ESSENTIAL HYPERTENSION WITH GOAL BLOOD PRESSURE LESS THAN 140/90: Primary | ICD-10-CM

## 2020-01-03 LAB — INR POINT OF CARE: 5.5 (ref 0.86–1.14)

## 2020-01-03 PROCEDURE — 99213 OFFICE O/P EST LOW 20 MIN: CPT | Performed by: PHYSICIAN ASSISTANT

## 2020-01-03 PROCEDURE — 36416 COLLJ CAPILLARY BLOOD SPEC: CPT

## 2020-01-03 PROCEDURE — 85610 PROTHROMBIN TIME: CPT | Mod: QW

## 2020-01-03 RX ORDER — LOSARTAN POTASSIUM 50 MG/1
50 TABLET ORAL DAILY
Qty: 90 TABLET | Refills: 1 | Status: SHIPPED | OUTPATIENT
Start: 2020-01-03 | End: 2020-03-10

## 2020-01-03 ASSESSMENT — MIFFLIN-ST. JEOR: SCORE: 2285.9

## 2020-01-03 NOTE — PROGRESS NOTES
ANTICOAGULATION FOLLOW-UP CLINIC VISIT    Patient Name:  Owen Sahni  Date:  1/3/2020  Contact Type:  Face to Face    SUBJECTIVE:  Patient Findings     Comments:   Seen in clinic today as he had a provider visit. Last 2 visits was sub and dose as adjusted. He no showed for 1 wk follow up apt. States his work in very busy and holiday time is worse. No bleeding or bruising. No missed doses. No change in diet. States has avoided salads since starting warfarin and I informed him again that a consistent amount of vit k helps keep INR stable. He will be seeing dermatology for ongoing rash. Uses benadryl for this. Already took warfarin today. Will hold dose tomorrow and reduce weekly dose by 20%. Explained again that in order to get INR stable we need consistent dose and INR checks.         Clinical Outcomes     Comments:   Seen in clinic today as he had a provider visit. Last 2 visits was sub and dose as adjusted. He no showed for 1 wk follow up apt. States his work in very busy and holiday time is worse. No bleeding or bruising. No missed doses. No change in diet. States has avoided salads since starting warfarin and I informed him again that a consistent amount of vit k helps keep INR stable. He will be seeing dermatology for ongoing rash. Uses benadryl for this. Already took warfarin today. Will hold dose tomorrow and reduce weekly dose by 20%. Explained again that in order to get INR stable we need consistent dose and INR checks.            OBJECTIVE    INR Protime   Date Value Ref Range Status   2020 5.5 (A) 0.86 - 1.14 Final       ASSESSMENT / PLAN  INR assessment SUPRA    Recheck INR In: 5 DAYS    INR Location Clinic      Anticoagulation Summary  As of 1/3/2020    INR goal:   2.0-3.0   TTR:   43.6 % (5.4 mo)   INR used for dosin.5! (1/3/2020)   Warfarin maintenance plan:   10 mg (5 mg x 2) every Sun, Tue, Thu; 7.5 mg (5 mg x 1.5) all other days   Full warfarin instructions:   : Hold; : 7.5  mg; Otherwise 10 mg every Sun, Tue, Thu; 7.5 mg all other days   Weekly warfarin total:   60 mg   Plan last modified:   Sushila Velazquez RN (1/3/2020)   Next INR check:   1/8/2020   Priority:   High   Target end date:       Indications    Atrial fibrillation (H) [I48.91]  Long term (current) use of anticoagulants [Z79.01]             Anticoagulation Episode Summary     INR check location:       Preferred lab:       Send INR reminders to:   VIDA EPPERSON    Comments:         Anticoagulation Care Providers     Provider Role Specialty Phone number    Evelio Oliver MD Referring Cardiology [phone]            See the Encounter Report to view Anticoagulation Flowsheet and Dosing Calendar (Go to Encounters tab in chart review, and find the Anticoagulation Therapy Visit)        Sushila Velazquez RN

## 2020-01-03 NOTE — PROGRESS NOTES
Subjective     Owen Sahni is a 59 year old male who has a history of hypertension, hypertrophic cardiomyopathy, A. fib presents to clinic today for the following health issues:  Brief history is a new onset of A. fib over the last few months he sees cardiology every 6 months.  He is to follow-up next month.  Here today because he is about to run out of his medications.  History of Present Illness        Hypertension: He presents for follow up of hypertension.  He does not check blood pressure  regularly outside of the clinic. Outside blood pressures have been over 140/90. He follows a low salt diet.    No chest pain or short of breath. No headache or dizziness.      Also over the last 6 months he had a sore on his right forearm that is now spread to multiple sores in his upper arms and upper legs.  He is unclear what is causes.  He states he tried multiple treatments of permethrin cream with no relief.  He states only thing that helps relieve some of his symptoms is Benadryl.  He states occasionally itch but not that bad.    Patient Active Problem List   Diagnosis     Sleep apnea     Lumbosacral radiculopathy     CARDIOVASCULAR SCREENING; LDL GOAL LESS THAN 130     Abnormal EKG     SOB (shortness of breath)     Seafood allergy     AK (actinic keratosis)     Myalgia     Advanced directives, counseling/discussion     Essential hypertension with goal blood pressure less than 140/90     Benign essential hypertension     Hypertrophic obstructive cardiomyopathy (H)     Systolic murmur     Atrial fibrillation (H)     Long term (current) use of anticoagulants     Obesity (BMI 35.0-39.9) with comorbidity (H)     Past Surgical History:   Procedure Laterality Date     COLONOSCOPY  4/12/2012    Procedure:COLONOSCOPY; Colonoscopy, screening; Surgeon:ARANZA TEE; Location: OR       Social History     Tobacco Use     Smoking status: Never Smoker     Smokeless tobacco: Never Used     Tobacco comment: non-smoking  household   Substance Use Topics     Alcohol use: Not Currently     Comment: 6 beers weekly     Family History   Problem Relation Age of Onset     Cancer Mother         breast     Cancer Father         Multiple myeloma     Hypertension Father      Arrhythmia No family hx of      Myocardial Infarction No family hx of      Diabetes No family hx of      Coronary Artery Disease No family hx of      Cerebrovascular Disease No family hx of          Current Outpatient Medications   Medication Sig Dispense Refill     losartan (COZAAR) 50 MG tablet Take 1 tablet (50 mg) by mouth daily 90 tablet 1     metoprolol succinate ER (TOPROL-XL) 50 MG 24 hr tablet Take 1 tablet (50 mg) by mouth daily 90 tablet 3     multivitamin, therapeutic with minerals (MULTI-VITAMIN) TABS Take 1 tablet by mouth daily.       warfarin ANTICOAGULANT (COUMADIN) 5 MG tablet Take daily as directed. Current dose 10 mg daily 180 tablet 0     No Known Allergies  BP Readings from Last 3 Encounters:   01/03/20 122/86   10/18/19 (!) 144/90   07/10/19 (!) 160/94    Wt Readings from Last 3 Encounters:   01/03/20 143.8 kg (317 lb)   10/18/19 119.7 kg (264 lb)   08/05/19 145.2 kg (320 lb)         Reviewed and updated as needed this visit by Provider         Review of Systems   ROS COMP: Constitutional, HEENT, cardiovascular, pulmonary, gi and gu systems are negative, except as otherwise noted.      Objective    /86   Pulse 73   Temp 97.7  F (36.5  C) (Oral)   Resp 18   Ht 1.829 m (6')   Wt 143.8 kg (317 lb)   SpO2 97%   BMI 42.99 kg/m    Body mass index is 42.99 kg/m .  Physical Exam   GENERAL: healthy, alert and no distress  RESP: lungs clear to auscultation - no rales, rhonchi or wheezes  CV: regular rate and rhythm, normal S1 S2, no S3 or S4, no murmur, click or rub, no peripheral edema and peripheral pulses strong  MS: no gross musculoskeletal defects noted, ukrt edema  Skin: Multiple 5 mm in diameter ulcerative type lesions throughout his  bilateral arms lower abdomen upper thighs.    Diagnostic Test Results:  Labs reviewed in Epic        Assessment & Plan       ICD-10-CM    1. Essential hypertension with goal blood pressure less than 140/90 I10 losartan (COZAAR) 50 MG tablet   2. Hypertrophic obstructive cardiomyopathy (H) I42.1    3. Atrial fibrillation, unspecified type (H) I48.91    4. Rash R21 DERMATOLOGY REFERRAL   1. Ok for refill.   2-3: keep follow up  With cardiology  4. Follow up  With dermatology, unclear etiology.     BMI:   Estimated body mass index is 42.99 kg/m  as calculated from the following:    Height as of this encounter: 1.829 m (6').    Weight as of this encounter: 143.8 kg (317 lb).   Weight management plan: Discussed healthy diet and exercise guidelines      No follow-ups on file.    Khris Brian PA-C  [facility]    The 10-year ASCVD risk score (Andreina THOMAS Jr., et al., 2013) is: 10%    Values used to calculate the score:      Age: 59 years      Sex: Male      Is Non- : No      Diabetic: No      Tobacco smoker: No      Systolic Blood Pressure: 122 mmHg      Is BP treated: Yes      HDL Cholesterol: 42 mg/dL      Total Cholesterol: 191 mg/dL

## 2020-01-06 ENCOUNTER — TELEPHONE (OUTPATIENT)
Dept: DERMATOLOGY | Facility: CLINIC | Age: 60
End: 2020-01-06

## 2020-01-06 NOTE — TELEPHONE ENCOUNTER
Appointment scheduled with Dr. Lobo on 1/21. No further questions at this time.  Chloe García LPN

## 2020-01-06 NOTE — TELEPHONE ENCOUNTER
----- Message from Aisha Jerome sent at 1/6/2020 10:06 AM CST -----  Regarding: rash  Please contact pt to schedule them for an appt for dx of rash.    Thanks!    Court

## 2020-01-14 ENCOUNTER — TELEPHONE (OUTPATIENT)
Dept: FAMILY MEDICINE | Facility: CLINIC | Age: 60
End: 2020-01-14

## 2020-01-14 DIAGNOSIS — I48.91 ATRIAL FIBRILLATION (H): Primary | ICD-10-CM

## 2020-01-14 NOTE — TELEPHONE ENCOUNTER
Overdue for INR. No show for last appointment. Was supra therapeutic at 5.5 on 2 wks ago. Has not been Therapeutic since 10/2/19. See anticoagulation encounters.   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 Denies any bleeding or bruising and says has been taking warfarin as directed at last visit. I informed him that it is very difficult to get his INR stable if we do not get consistent INR checks. He states he can't do anything about his schedule. Scheduled for INR on 1/20.   FYI to provider and to further advise. Sushila Velazquez RN

## 2020-01-14 NOTE — TELEPHONE ENCOUNTER
I think if his insurance covers we can try the DOACs rather than inconsistent follow up with warfarin. We will look in to this.       Cardiology

## 2020-01-15 DIAGNOSIS — I48.91 ATRIAL FIBRILLATION (H): Primary | ICD-10-CM

## 2020-01-15 NOTE — TELEPHONE ENCOUNTER
Contacted the pharmacy and it would cost patient $1500 for a 3 month supply.  Not sure if he has a deductible.  He does have commercial insurance? [employer]) and may qualify for the savings card.  Patient informed and is interested in savings card. He will  next week Monday.    Ene Germain RN

## 2020-01-15 NOTE — TELEPHONE ENCOUNTER
I will try Eliquis 5 mg twice daily.  Just need your approval then I can send a prescription to determine coverage. He may qualify for the savings card.    Ene Germain RN

## 2020-01-15 NOTE — TELEPHONE ENCOUNTER
Rx sent to preferred pharmacy.  Warfarin removed from med list.  Will call pharmacy to determine coverage. Ene Germain RN

## 2020-01-16 NOTE — TELEPHONE ENCOUNTER
Spoke to patient and notified patient the Eliquis savings card is ready for  at [address] [place] [address] 1st floor.  Ene Germain RN

## 2020-01-17 ENCOUNTER — TELEPHONE (OUTPATIENT)
Dept: DERMATOLOGY | Facility: CLINIC | Age: 60
End: 2020-01-17

## 2020-01-17 NOTE — TELEPHONE ENCOUNTER
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  Pt states he cannot say no to 10 people and he is asking for a reschedule on any derm schedule.   only saw return spots.  Thanks.

## 2020-01-22 ENCOUNTER — ANTICOAGULATION THERAPY VISIT (OUTPATIENT)
Dept: NURSING | Facility: CLINIC | Age: 60
End: 2020-01-22
Payer: COMMERCIAL

## 2020-01-22 DIAGNOSIS — Z79.01 LONG TERM (CURRENT) USE OF ANTICOAGULANTS: ICD-10-CM

## 2020-01-22 DIAGNOSIS — I48.91 ATRIAL FIBRILLATION, UNSPECIFIED TYPE (H): ICD-10-CM

## 2020-01-22 LAB — INR POINT OF CARE: 4.1 (ref 0.86–1.14)

## 2020-01-22 PROCEDURE — 36416 COLLJ CAPILLARY BLOOD SPEC: CPT

## 2020-01-22 PROCEDURE — 85610 PROTHROMBIN TIME: CPT | Mod: QW

## 2020-01-22 NOTE — PROGRESS NOTES
ANTICOAGULATION FOLLOW-UP CLINIC VISIT    Patient Name:  Owen Sahni  Date:  2020  Contact Type:  Face to Face    SUBJECTIVE:  Patient Findings     Comments:   4.1 down from 5.5. Missed last apt due to work. Says he continued on same dose as was on his last dose card. Provider suggested switch to Eliquis. Patient is to  Savings card for pharmacy and check with pricing. Scheduled for next INR in 5 days and says he will do his best to keep the apt.         Clinical Outcomes     Comments:   4.1 down from 5.5. Missed last apt due to work. Says he continued on same dose as was on his last dose card. Provider suggested switch to Eliquis. Patient is to  Savings card for pharmacy and check with pricing. Scheduled for next INR in 5 days and says he will do his best to keep the apt.            OBJECTIVE    INR Protime   Date Value Ref Range Status   2020 4.1 (A) 0.86 - 1.14 Final       ASSESSMENT / PLAN  INR assessment SUPRA    Recheck INR In: 5 DAYS    INR Location Clinic      Anticoagulation Summary  As of 2020    INR goal:   2.0-3.0   TTR:   39.0 % (6 mo)   INR used for dosin.1! (2020)   Warfarin maintenance plan:   7.5 mg (5 mg x 1.5) every day   Full warfarin instructions:   : 5 mg; Otherwise 7.5 mg every day   Weekly warfarin total:   52.5 mg   Plan last modified:   Sushila Velazquez RN (2020)   Next INR check:   2020   Priority:   High   Target end date:       Indications    Atrial fibrillation (H) [I48.91]  Long term (current) use of anticoagulants [Z79.01]             Anticoagulation Episode Summary     INR check location:       Preferred lab:       Send INR reminders to:   VIDA EPPERSON    Comments:         Anticoagulation Care Providers     Provider Role Specialty Phone number    Evelio Oliver MD Referring Cardiology [phone]            See the Encounter Report to view Anticoagulation Flowsheet and Dosing Calendar (Go to Encounters tab in chart  review, and find the Anticoagulation Therapy Visit)        Sushila Velazquez RN

## 2020-01-27 ENCOUNTER — ANTICOAGULATION THERAPY VISIT (OUTPATIENT)
Dept: NURSING | Facility: CLINIC | Age: 60
End: 2020-01-27
Payer: COMMERCIAL

## 2020-01-27 ENCOUNTER — OFFICE VISIT (OUTPATIENT)
Dept: DERMATOLOGY | Facility: CLINIC | Age: 60
End: 2020-01-27
Payer: COMMERCIAL

## 2020-01-27 DIAGNOSIS — R21 RASH AND OTHER NONSPECIFIC SKIN ERUPTION: Primary | ICD-10-CM

## 2020-01-27 DIAGNOSIS — Z79.01 LONG TERM (CURRENT) USE OF ANTICOAGULANTS: ICD-10-CM

## 2020-01-27 DIAGNOSIS — D48.9 NEOPLASM OF UNCERTAIN BEHAVIOR: ICD-10-CM

## 2020-01-27 DIAGNOSIS — I48.91 ATRIAL FIBRILLATION, UNSPECIFIED TYPE (H): ICD-10-CM

## 2020-01-27 LAB — INR POINT OF CARE: 2.2 (ref 0.86–1.14)

## 2020-01-27 PROCEDURE — 99203 OFFICE O/P NEW LOW 30 MIN: CPT | Mod: 25 | Performed by: DERMATOLOGY

## 2020-01-27 PROCEDURE — 11103 TANGNTL BX SKIN EA SEP/ADDL: CPT | Performed by: DERMATOLOGY

## 2020-01-27 PROCEDURE — 11104 PUNCH BX SKIN SINGLE LESION: CPT | Performed by: DERMATOLOGY

## 2020-01-27 PROCEDURE — 36416 COLLJ CAPILLARY BLOOD SPEC: CPT

## 2020-01-27 PROCEDURE — 85610 PROTHROMBIN TIME: CPT | Mod: QW

## 2020-01-27 PROCEDURE — 88305 TISSUE EXAM BY PATHOLOGIST: CPT | Mod: TC | Performed by: DERMATOLOGY

## 2020-01-27 RX ORDER — PRAMOXINE HYDROCHLORIDE 10 MG/ML
LOTION TOPICAL
Qty: 237 ML | Refills: 1 | Status: SHIPPED | OUTPATIENT
Start: 2020-01-27

## 2020-01-27 RX ORDER — TRIAMCINOLONE ACETONIDE 1 MG/G
CREAM TOPICAL 2 TIMES DAILY
Qty: 454 G | Refills: 0 | Status: SHIPPED | OUTPATIENT
Start: 2020-01-27 | End: 2020-05-28

## 2020-01-27 NOTE — PROGRESS NOTES
"[facility] Dermatology Note    Dermatology Problem List:  1. Rash / non specific eruption affecting upper and lower extremities, stomach, upper back and buttocks  -s/p punch biopsy from the right distal tricep (NUB A) on 1/27/20.   -s/p shave biopsy on the right retro auricular neck (NUB C) and right base of the thumb (NUB D)   -Fungal scraping negative for mites on 1/27/20   -Current t/x: Bleach baths  -s/p permethrin cream without relief.   2. NUB B, left angle of the jaw, s/p biopsy 1/27/20    Encounter Date: Jan 27, 2020    CC:  Chief Complaint   Patient presents with     Derm Problem     Rash on hands and arms and legs- on warfarin taking benedryl constantly      History of Present Illness:  Mr. Weaver \"MARÍA ELENA\" ADONIS Sahni is a 59 year old male who presents in self referral for a rash. Today he reports a rash on his hands, arms, stomach, upper back, buttocks and legs. This has been present for 6-8 months and began on his right forearm. It has now spread to his upper arms and legs (similar on both right and left sides). No lesions between the fingers, or on the feet. No itch in the middle back where he cannot reach. This is very itchy. It will begin as a very small \"pin hole\" and will then grow until they look like a sore. When he scratches at these lesions a lot, they will scab up and drain. He will sometimes take a knife and scrape the flaking skin off. Sometimes he will wake up at night because these lesions are so itchy. He is concerned that this could be bed bugs or mites because he sleeps in a hotel once a week in [place] (works as a ). He lives with his girlfriend who does not have the rash. 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     He is on warfarin and takes benadryl constantly to help with the itching. He feels benadryl is the only thing that has alleviated his symptoms but hasn't fully resolved the lesions. He states that the benadryl has also relieved his " "symptoms like shortness of breath. Denies any lymphadenopathy. He has also been prescribed permethrin cream on his whole body twice (10 days between the 2 applications) without resolve. He has tried washing his bed sheets daily and disinfecting things frequently which hasn't helped either.     When asked about a pink plaque on the left angle of his jaw, he states it has been there for about 8 years and was previously treated with cryotherapy. He also describes this lesion as a \"sore that never heals\" and will be tender to the touch. He states that this is not associated with the rash on the rest of his body. No other concerns addressed today.    Past Medical History:   Patient Active Problem List   Diagnosis     Sleep apnea     Lumbosacral radiculopathy     CARDIOVASCULAR SCREENING; LDL GOAL LESS THAN 130     Abnormal EKG     SOB (shortness of breath)     Seafood allergy     AK (actinic keratosis)     Myalgia     Advanced directives, counseling/discussion     Essential hypertension with goal blood pressure less than 140/90     Benign essential hypertension     Hypertrophic obstructive cardiomyopathy (H)     Systolic murmur     Atrial fibrillation (H)     Long term (current) use of anticoagulants     Obesity (BMI 35.0-39.9) with comorbidity (H)     Past Medical History:   Diagnosis Date     Hypertension      Hypertrophic cardiomyopathy (H)      Past Surgical History:   Procedure Laterality Date     COLONOSCOPY  4/12/2012    Procedure:COLONOSCOPY; Colonoscopy, screening; Surgeon:ARANZA TEE; Location: OR     Social History:  Patient works as a .     Family History:  No family history of skin cancer. Father with a history of multiple myeloma (passed from this).     Medications:  Current Outpatient Medications   Medication Sig Dispense Refill     apixaban ANTICOAGULANT (ELIQUIS ANTICOAGULANT) 5 MG tablet Take 1 tablet (5 mg) by mouth 2 times daily 180 tablet 3     losartan (COZAAR) 50 MG tablet " Take 1 tablet (50 mg) by mouth daily 90 tablet 1     metoprolol succinate ER (TOPROL-XL) 50 MG 24 hr tablet Take 1 tablet (50 mg) by mouth daily 90 tablet 3     multivitamin, therapeutic with minerals (MULTI-VITAMIN) TABS Take 1 tablet by mouth daily.       No Known Allergies    Review of Systems:  -Constitutional: Patient is otherwise feeling well, in usual state of health.   -Skin: As above in HPI. No additional skin concerns.    Physical exam:  Vitals: There were no vitals taken for this visit.  GEN: This is a well developed, well-nourished male in no acute distress, in a pleasant mood.    SKIN: Total skin excluding the undergarment areas was performed. The exam included the head/face, neck, both arms, chest, back, abdomen, both legs, digits and/or nails and buttocks.   - Chan Type II  - NUB A: Right distal tricep there is a 1.5 cm scaly pink plaque  - NUB B: Left angle of the jaw there is a 1.5 cm pearly sclerotic plaque  - NUB C: Right retro auricular neck there is a 0.5 cm pink plaque  - NUB D: Right base of the thumb there is a poorly defined pink plaque with superficial hemorraghic crust     - Multiple scaly erosions with hemorrhagic and serous crust scattered on arms, buttocks, thighs, chest, occipital scalp, shoulders and upper back.    Primary lesions difficult to find.    Scaly skin colored plaque on right distal tricep.    Mineral oil prep of bilateral wrist scraping without evidence of scabies.     - No other lesions of concern on areas examined.     NUB D:         NUB B:         NUB A:         NUB C:         Impression/Plan:    1. Rash / non specific eruption. Unable to obtain a definitive diagnosis clinically so we will obtain punch biopsy from the most recent lesions today. We will then determine a treatment plan from biopsy results.   DDx: neurotic excoriations, papular eczema, psoriasis, MF    Risks and benefits were discussed, patient is agreeable to this. In the mean time, we will treat  with a stronger potency of topical steroid, bleach baths which should aid in healing of the open sores, and a topical to help with the itch sensation.   - Mineral oil scraping taken today was negative for mites so we will have patient discontinue usage of permethrin cream.    - NUB A: Punch biopsy:  After discussion of benefits and risks including but not limited to bleeding/bruising, pain/swelling, infection, scar, incomplete removal, nerve damage/numbness, recurrence, and non-diagnostic biopsy, written consent, verbal consent and photographs were obtained. Time-out was performed. The area was cleaned with isopropyl alcohol. 0.5mL of 1% lidocaine with 1:100,000 epinephrine was injected to obtain adequate anesthesia of the lesion on the right distal tricep. A 6 mm punch biopsy was performed. 4-0 prolene sutures were utilized to approximate the epidermal edges. White petroleum jelly/Vaseline and a bandage was applied to the wound. Explicit verbal and written wound care instructions were provided. The patient left the Dermatology Clinic in good condition. The patient was counseled to follow up for suture removal in approximately 14 days.  - Shave biopsy x2. NUB C: right retro auricular neck and NUB D: right base of the thumb:  After discussion of benefits and risks including but not limited to bleeding/bruising, pain/swelling, infection, scar, incomplete removal, nerve damage/numbness, recurrence, and non-diagnostic biopsy, written consent, verbal consent and photographs were obtained. Time-out was performed. The area was cleaned with isopropyl alcohol. 0.5ml of 1% lidocaine with 1:100,000 epinephrine was injected to obtain adequate anesthesia. A shave biopsy was performed. Hemostasis was achieved with aluminium chloride. Vaseline and a sterile dressing were applied. The patient tolerated the procedure and no complications were noted. The patient was provided with verbal and written post care instructions.  -  Triamcinolone 0.1% cream prescribed.   - Prescribed pramoxine 1% lotion PRN and TAC 0.1% cream BID.   - Bleach bath instructions were provided.     2. NUB B: left angle of the jaw. DDx: SCC vs BCC  - Shave biopsy:  After discussion of benefits and risks including but not limited to bleeding/bruising, pain/swelling, infection, scar, incomplete removal, nerve damage/numbness, recurrence, and non-diagnostic biopsy, written consent, verbal consent and photographs were obtained. Time-out was performed. The area was cleaned with isopropyl alcohol. 0.5ml of 1% lidocaine with 1:100,000 epinephrine was injected to obtain adequate anesthesia. A shave biopsy was performed. Hemostasis was achieved with aluminium chloride. Vaseline and a sterile dressing were applied. The patient tolerated the procedure and no complications were noted. The patient was provided with verbal and written post care instructions.    Follow-up pending biopsy results for rash, earlier for new or changing lesions.     Staff Involved:  Scribe/Staff    Scribe Disclosure  I, Kathryn Norton, am serving as a scribe to document services personally performed by Dr. Jass John MD, based on data collection and the provider's statements to me.     Provider Disclosure:   The documentation recorded by the scribe accurately reflects the services I personally performed and the decisions made by me.  I personally performed the procedures today.    Jass John DO    Department of Dermatology  [facility]: Phone: [phone], Fax:[phone]  [facility]: Phone: [phone], Fax: [phone]

## 2020-01-27 NOTE — PATIENT INSTRUCTIONS
Begin applying triamcinolone 0.1% cream twice a day onto any affected lesions on the body. This is a steroid cream that should calm down the inflammation of these spots.     Also begin applying pramoxine 1% lotion onto your skin up to 3 times daily. This should help with the itching sensation.     You can take a bleach bath once a week.   You can make one of these baths by putting 1/2 cup non-concentrated bleach (1/3 cup of concentrated bleach) in a full tub of water. Soak for 10-15 minutes. As soon as you get out of the bath, apply moisturizer. I recommend a cream based moisturizer Vanicream.      Wound Care After a Biopsy    What is a skin biopsy?  A skin biopsy allows the doctor to examine a very small piece of tissue under the microscope to determine the diagnosis and the best treatment for the skin condition. A local anesthetic (numbing medicine)  is injected with a very small needle into the skin area to be tested. A small piece of skin is taken from the area. Sometimes a suture (stitch) is used.     What are the risks of a skin biopsy?  I will experience scar, bleeding, swelling, pain, crusting and redness. I may experience incomplete removal or recurrence. Risks of this procedure are excessive bleeding, bruising, infection, nerve damage, numbness, thick (hypertrophic or keloidal) scar and non-diagnostic biopsy.    How should I care for my wound for the first 24 hours?    Keep the wound dry and covered for 24 hours    If it bleeds, hold direct pressure on the area for 15 minutes. If bleeding does not stop then go to the emergency room    Avoid strenuous exercise the first 1-2 days or as your doctor instructs you    How should I care for the wound after 24 hours?    After 24 hours, remove the bandage    You may bathe or shower as normal    If you had a scalp biopsy, you can shampoo as usual and can use shower water to clean the biopsy site daily    Clean the wound twice a day with gentle soap and water    Do  not scrub, be gentle    Apply white petroleum/Vaseline after cleaning the wound with a cotton swab or a clean finger, and keep the site covered with a Bandaid /bandage. Bandages are not necessary with a scalp biopsy    If you are unable to cover the site with a Bandaid /bandage, re-apply ointment 2-3 times a day to keep the site moist. Moisture will help with healing    Avoid strenuous activity for first 1-2 days    Avoid lakes, rivers, pools, and oceans until the stitches are removed or the site is healed    How do I clean my wound?    Wash hands thoroughly with soap or use hand  before all wound care    Clean the wound with gentle soap and water    Apply white petroleum/Vaseline  to wound after it is clean    Replace the Bandaid /bandage to keep the wound covered for the first few days or as instructed by your doctor    If you had a scalp biopsy, warm shower water to the area on a daily basis should suffice    What should I use to clean my wound?     Cotton-tipped applicators (Qtips )    White petroleum jelly (Vaseline ). Use a clean new container and use Q-tips to apply.    Bandaids   as needed    Gentle soap     How should I care for my wound long term?    Do not get your wound dirty    Keep up with wound care for one week or until the area is healed.    A small scab will form and fall off by itself when the area is completely healed. The area will be red and will become pink in color as it heals. Sun protection is very important for how your scar will turn out. Sunscreen with an SPF 30 or greater is recommended once the area is healed.    If you have stitches, stitches need to be removed in 14 days. You may return to our clinic for this or you may have it done locally at your doctor s office.    You should have some soreness but it should be mild and slowly go away over several days. Talk to your doctor about using tylenol for pain,    When should I call my doctor?  If you have increased:     Pain or  swelling    Pus or drainage (clear or slightly yellow drainage is ok)    Temperature over 100F    Spreading redness or warmth around wound    When will I hear about my results?  The biopsy results can take 2-3 weeks to come back. The clinic will call you with the results, send you a [employer] message, or have you schedule a follow-up clinic or phone time to discuss the results. Contact our clinics if you do not hear from us in 3 weeks.     Who should I call with questions?    [facility]: [phone]     [facility]: [phone]    For urgent needs outside of business hours call the [facility] at [phone] and ask for the dermatology resident on call

## 2020-01-27 NOTE — NURSING NOTE
Owen Sahni's goals for this visit include:   Chief Complaint   Patient presents with     Derm Problem     Rash on hands and arms and legs- on warfarin taking benedryl constantly        He requests these members of his care team be copied on today's visit information: yes    PCP: Braulio Coronel    Referring Provider:  No referring provider defined for this encounter.    There were no vitals taken for this visit.    Do you need any medication refills at today's visit? No     Amorrae WILLIAN Santoyo

## 2020-01-27 NOTE — LETTER
"    1/27/2020         RE: Owen Sahni  [address] [facility] [id number]        Dear Colleague,    Thank you for referring your patient, Owen Sahni, to the [facility]. Please see a copy of my visit note below.    [facility] Dermatology Note    Dermatology Problem List:  1. Rash / non specific eruption affecting upper and lower extremities, stomach, upper back and buttocks  -s/p punch biopsy from the right distal tricep (NUB A) on 1/27/20.   -s/p shave biopsy on the right retro auricular neck (NUB C) and right base of the thumb (NUB D)   -Fungal scraping negative for mites on 1/27/20   -Current t/x: Bleach baths  -s/p permethrin cream without relief.   2. NUB B, left angle of the jaw, s/p biopsy 1/27/20    Encounter Date: Jan 27, 2020    CC:  Chief Complaint   Patient presents with     Derm Problem     Rash on hands and arms and legs- on warfarin taking benedryl constantly      History of Present Illness:  Mr. Weaver \"MARÍA ELENA\" ADONIS Sahni is a 59 year old male who presents in self referral for a rash. Today he reports a rash on his hands, arms, stomach, upper back, buttocks and legs. This has been present for 6-8 months and began on his right forearm. It has now spread to his upper arms and legs (similar on both right and left sides). No lesions between the fingers, or on the feet. No itch in the middle back where he cannot reach. This is very itchy. It will begin as a very small \"pin hole\" and will then grow until they look like a sore. When he scratches at these lesions a lot, they will scab up and drain. He will sometimes take a knife and scrape the flaking skin off. Sometimes he will wake up at night because these lesions are so itchy. He is concerned that this could be bed bugs or mites because he sleeps in a hotel once a week in [place] (works as a ). He lives with his girlfriend who does not have the rash. 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     He is on warfarin and takes benadryl constantly to help with the itching. He feels benadryl is the only thing that has alleviated his symptoms but hasn't fully resolved the lesions. He states that the benadryl has also relieved his symptoms like shortness of breath. Denies any lymphadenopathy. He has also been prescribed permethrin cream on his whole body twice (10 days between the 2 applications) without resolve. He has tried washing his bed sheets daily and disinfecting things frequently which hasn't helped either.     When asked about a pink plaque on the left angle of his jaw, he states it has been there for about 8 years and was previously treated with cryotherapy. He also describes this lesion as a \"sore that never heals\" and will be tender to the touch. He states that this is not associated with the rash on the rest of his body. No other concerns addressed today.    Past Medical History:   Patient Active Problem List   Diagnosis     Sleep apnea     Lumbosacral radiculopathy     CARDIOVASCULAR SCREENING; LDL GOAL LESS THAN 130     Abnormal EKG     SOB (shortness of breath)     Seafood allergy     AK (actinic keratosis)     Myalgia     Advanced directives, counseling/discussion     Essential hypertension with goal blood pressure less than 140/90     Benign essential hypertension     Hypertrophic obstructive cardiomyopathy (H)     Systolic murmur     Atrial fibrillation (H)     Long term (current) use of anticoagulants     Obesity (BMI 35.0-39.9) with comorbidity (H)     Past Medical History:   Diagnosis Date     Hypertension      Hypertrophic cardiomyopathy (H)      Past Surgical History:   Procedure Laterality Date     COLONOSCOPY  4/12/2012    Procedure:COLONOSCOPY; Colonoscopy, screening; Surgeon:ARANZA TEE; Location: OR     Social History:  Patient works as a .     Family History:  No family history of skin cancer. Father with a history of multiple myeloma (passed " from this).     Medications:  Current Outpatient Medications   Medication Sig Dispense Refill     apixaban ANTICOAGULANT (ELIQUIS ANTICOAGULANT) 5 MG tablet Take 1 tablet (5 mg) by mouth 2 times daily 180 tablet 3     losartan (COZAAR) 50 MG tablet Take 1 tablet (50 mg) by mouth daily 90 tablet 1     metoprolol succinate ER (TOPROL-XL) 50 MG 24 hr tablet Take 1 tablet (50 mg) by mouth daily 90 tablet 3     multivitamin, therapeutic with minerals (MULTI-VITAMIN) TABS Take 1 tablet by mouth daily.       No Known Allergies    Review of Systems:  -Constitutional: Patient is otherwise feeling well, in usual state of health.   -Skin: As above in HPI. No additional skin concerns.    Physical exam:  Vitals: There were no vitals taken for this visit.  GEN: This is a well developed, well-nourished male in no acute distress, in a pleasant mood.    SKIN: Total skin excluding the undergarment areas was performed. The exam included the head/face, neck, both arms, chest, back, abdomen, both legs, digits and/or nails and buttocks.   - Chan Type II  - NUB A: Right distal tricep there is a 1.5 cm scaly pink plaque  - NUB B: Left angle of the jaw there is a 1.5 cm pearly sclerotic plaque  - NUB C: Right retro auricular neck there is a 0.5 cm pink plaque  - NUB D: Right base of the thumb there is a poorly defined pink plaque with superficial hemorraghic crust     - Multiple scaly erosions with hemorrhagic and serous crust scattered on arms, buttocks, thighs, chest, occipital scalp, shoulders and upper back.    Primary lesions difficult to find.    Scaly skin colored plaque on right distal tricep.    Mineral oil prep of bilateral wrist scraping without evidence of scabies.     - No other lesions of concern on areas examined.     NUB D:         NUB B:         NUB A:         NUB C:         Impression/Plan:    1. Rash / non specific eruption. Unable to obtain a definitive diagnosis clinically so we will obtain punch biopsy from the  most recent lesions today. We will then determine a treatment plan from biopsy results.   DDx: neurotic excoriations, papular eczema, psoriasis, MF    Risks and benefits were discussed, patient is agreeable to this. In the mean time, we will treat with a stronger potency of topical steroid, bleach baths which should aid in healing of the open sores, and a topical to help with the itch sensation.   - Mineral oil scraping taken today was negative for mites so we will have patient discontinue usage of permethrin cream.    - NUB A: Punch biopsy:  After discussion of benefits and risks including but not limited to bleeding/bruising, pain/swelling, infection, scar, incomplete removal, nerve damage/numbness, recurrence, and non-diagnostic biopsy, written consent, verbal consent and photographs were obtained. Time-out was performed. The area was cleaned with isopropyl alcohol. 0.5mL of 1% lidocaine with 1:100,000 epinephrine was injected to obtain adequate anesthesia of the lesion on the right distal tricep. A 6 mm punch biopsy was performed. 4-0 prolene sutures were utilized to approximate the epidermal edges. White petroleum jelly/Vaseline and a bandage was applied to the wound. Explicit verbal and written wound care instructions were provided. The patient left the Dermatology Clinic in good condition. The patient was counseled to follow up for suture removal in approximately 14 days.  - Shave biopsy x2. NUB C: right retro auricular neck and NUB D: right base of the thumb:  After discussion of benefits and risks including but not limited to bleeding/bruising, pain/swelling, infection, scar, incomplete removal, nerve damage/numbness, recurrence, and non-diagnostic biopsy, written consent, verbal consent and photographs were obtained. Time-out was performed. The area was cleaned with isopropyl alcohol. 0.5ml of 1% lidocaine with 1:100,000 epinephrine was injected to obtain adequate anesthesia. A shave biopsy was performed.  Hemostasis was achieved with aluminium chloride. Vaseline and a sterile dressing were applied. The patient tolerated the procedure and no complications were noted. The patient was provided with verbal and written post care instructions.  - Triamcinolone 0.1% cream prescribed.   - Prescribed pramoxine 1% lotion PRN and TAC 0.1% cream BID.   - Bleach bath instructions were provided.     2. NUB B: left angle of the jaw. DDx: SCC vs BCC  - Shave biopsy:  After discussion of benefits and risks including but not limited to bleeding/bruising, pain/swelling, infection, scar, incomplete removal, nerve damage/numbness, recurrence, and non-diagnostic biopsy, written consent, verbal consent and photographs were obtained. Time-out was performed. The area was cleaned with isopropyl alcohol. 0.5ml of 1% lidocaine with 1:100,000 epinephrine was injected to obtain adequate anesthesia. A shave biopsy was performed. Hemostasis was achieved with aluminium chloride. Vaseline and a sterile dressing were applied. The patient tolerated the procedure and no complications were noted. The patient was provided with verbal and written post care instructions.    Follow-up pending biopsy results for rash, earlier for new or changing lesions.     Staff Involved:  Scribe/Staff    Scribe Disclosure  I, Kathryn Norton, am serving as a scribe to document services personally performed by Dr. Jass John MD, based on data collection and the provider's statements to me.     Provider Disclosure:   The documentation recorded by the scribe accurately reflects the services I personally performed and the decisions made by me.  I personally performed the procedures today.    Jass John DO    Department of Dermatology  [facility]: Phone: [phone], Fax:[phone]  [facility]: Phone: [phone], Fax: [phone]    Again,  thank you for allowing me to participate in the care of your patient.        Sincerely,        Jass John MD

## 2020-01-27 NOTE — NURSING NOTE
The following medication was given:     MEDICATION:  Lidocaine with epinephrine 1% 1:[id number]  ROUTE: SQ  SITE: see procedure note  DOSE: 3 ml   LOT #: -[id number]  : Samantha  EXPIRATION DATE: 11/1/2020  NDC#: 7440-3508-35   Was there drug waste? NA   Multi-dose vial: Yes    Alex Santoyo MA  January 27, 2020

## 2020-01-27 NOTE — PROGRESS NOTES
ANTICOAGULATION FOLLOW-UP CLINIC VISIT    Patient Name:  Owen Sahni  Date:  2020  Contact Type:  Face to Face    SUBJECTIVE:  Patient Findings     Comments:   Patient reports he took dose different than instructed due to travel and setting up his medication box wrong. He is therapeutic today so will adjust weekly dose to be same . Seeing Dermatology today for ongoing rash. Will need to call if any new medications ordered that could effect INR. Plans to switch to Eliquis in future and is going to  savings card for this but may hold off starting this as he has good supply left of warfarin.         Clinical Outcomes     Comments:   Patient reports he took dose different than instructed due to travel and setting up his medication box wrong. He is therapeutic today so will adjust weekly dose to be same . Seeing Dermatology today for ongoing rash. Will need to call if any new medications ordered that could effect INR. Plans to switch to Eliquis in future and is going to  savings card for this but may hold off starting this as he has good supply left of warfarin.            OBJECTIVE    INR Protime   Date Value Ref Range Status   2020 2.2 (A) 0.86 - 1.14 Final       ASSESSMENT / PLAN  INR assessment THER    Recheck INR In: 2 WEEKS    INR Location Clinic      Anticoagulation Summary  As of 2020    INR goal:   2.0-3.0   TTR:   39.1 % (6.2 mo)   INR used for dosin.2 (2020)   Warfarin maintenance plan:   5 mg (5 mg x 1) every Mon, Thu; 7.5 mg (5 mg x 1.5) all other days   Full warfarin instructions:   5 mg every Mon, Thu; 7.5 mg all other days   Weekly warfarin total:   47.5 mg   Plan last modified:   Sushila Velazquez RN (2020)   Next INR check:   2/10/2020   Priority:   High   Target end date:       Indications    Atrial fibrillation (H) [I48.91]  Long term (current) use of anticoagulants [Z79.01]             Anticoagulation Episode Summary     INR check location:        Preferred lab:       Send INR reminders to:   VIDA EPPERSON    Comments:         Anticoagulation Care Providers     Provider Role Specialty Phone number    Evelio Oliver MD Referring Cardiology [phone]            See the Encounter Report to view Anticoagulation Flowsheet and Dosing Calendar (Go to Encounters tab in chart review, and find the Anticoagulation Therapy Visit)        Sushila Velazquez RN

## 2020-01-30 LAB — COPATH REPORT: NORMAL

## 2020-02-09 DIAGNOSIS — I48.0 PAF (PAROXYSMAL ATRIAL FIBRILLATION) (H): ICD-10-CM

## 2020-02-10 ENCOUNTER — TELEPHONE (OUTPATIENT)
Dept: DERMATOLOGY | Facility: CLINIC | Age: 60
End: 2020-02-10

## 2020-02-10 ENCOUNTER — ANTICOAGULATION THERAPY VISIT (OUTPATIENT)
Dept: NURSING | Facility: CLINIC | Age: 60
End: 2020-02-10
Payer: COMMERCIAL

## 2020-02-10 ENCOUNTER — OFFICE VISIT (OUTPATIENT)
Dept: DERMATOLOGY | Facility: CLINIC | Age: 60
End: 2020-02-10
Payer: COMMERCIAL

## 2020-02-10 DIAGNOSIS — L28.1 PRURIGO NODULARIS: Primary | ICD-10-CM

## 2020-02-10 DIAGNOSIS — I48.91 ATRIAL FIBRILLATION, UNSPECIFIED TYPE (H): ICD-10-CM

## 2020-02-10 DIAGNOSIS — C44.329 SQUAMOUS CELL CANCER OF SKIN OF LEFT CHEEK: ICD-10-CM

## 2020-02-10 DIAGNOSIS — Z79.01 LONG TERM (CURRENT) USE OF ANTICOAGULANTS: ICD-10-CM

## 2020-02-10 LAB — INR POINT OF CARE: 1.6 (ref 0.86–1.14)

## 2020-02-10 PROCEDURE — 99207 ZZC NO CHARGE NURSE ONLY: CPT

## 2020-02-10 PROCEDURE — 85610 PROTHROMBIN TIME: CPT | Mod: QW

## 2020-02-10 PROCEDURE — 36416 COLLJ CAPILLARY BLOOD SPEC: CPT

## 2020-02-10 PROCEDURE — 99213 OFFICE O/P EST LOW 20 MIN: CPT | Mod: 57 | Performed by: DERMATOLOGY

## 2020-02-10 ASSESSMENT — PAIN SCALES - GENERAL: PAINLEVEL: NO PAIN (0)

## 2020-02-10 NOTE — PROGRESS NOTES
[facility] Dermatology Note      Dermatology Problem List:  1. Rash / non specific eruption affecting upper and lower extremities, stomach, upper back and buttocks. 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 Previous fungal scraping negative for mites.   - current t/x: Bleach baths, triamcinolone   - s/p permethrin cream without relief.   2. SCC, left angle of the jaw   - pending Mohs surgery     Encounter Date: Feb 10, 2020    CC:  Chief Complaint   Patient presents with     Rash     using triamcinolone and taking benadryl          History of Present Illness:  Mr. Owen Sahni is a 59 year old male who presents today for a rash recheck. He was last seen on 1/27/20 when several biopsies were taken that were consistent with prurigo nodularis. Another biopsy taken from the left angle of the jaw showed superficially invasive squamous cell carcinoma. This is his first diagnosed skin cancer. He asks about treatment for this.     He thinks the triamcinolone has helped with his prurigo nodules. He thinks it helped dry some of them out. He tries to apply as little as possible. Applies is usually once at night. Certain areas seem to have healed up pretty well. He says the lesions have not been too itchy anymore. He tries his best to avoid scratching them. He is relieved to hear this is not related to mites or bugs. He is otherwise feeling well. No other skin concerns addressed.       Past Medical History:   Patient Active Problem List   Diagnosis     Sleep apnea     Lumbosacral radiculopathy     CARDIOVASCULAR SCREENING; LDL GOAL LESS THAN 130     Abnormal EKG     SOB (shortness of breath)     Seafood allergy     AK (actinic keratosis)     Myalgia     Advanced directives, counseling/discussion     Essential hypertension with goal blood pressure less than 140/90     Benign essential hypertension     Hypertrophic obstructive cardiomyopathy (H)     Systolic murmur     Atrial fibrillation (H)     Long  term (current) use of anticoagulants     Obesity (BMI 35.0-39.9) with comorbidity (H)     Past Medical History:   Diagnosis Date     Hypertension      Hypertrophic cardiomyopathy (H)      Past Surgical History:   Procedure Laterality Date     COLONOSCOPY  4/12/2012    Procedure:COLONOSCOPY; Colonoscopy, screening; Surgeon:ARANZA TEE; Location: OR       Social History:  Patient reports that he has never smoked. He has never used smokeless tobacco. He reports previous alcohol use. He reports that he does not use drugs.    Family History:  Family History   Problem Relation Age of Onset     Cancer Mother         breast     Cancer Father         Multiple myeloma     Hypertension Father      Arrhythmia No family hx of      Myocardial Infarction No family hx of      Diabetes No family hx of      Coronary Artery Disease No family hx of      Cerebrovascular Disease No family hx of        Medications:  Current Outpatient Medications   Medication Sig Dispense Refill     apixaban ANTICOAGULANT (ELIQUIS ANTICOAGULANT) 5 MG tablet Take 1 tablet (5 mg) by mouth 2 times daily 180 tablet 3     losartan (COZAAR) 50 MG tablet Take 1 tablet (50 mg) by mouth daily 90 tablet 1     metoprolol succinate ER (TOPROL-XL) 50 MG 24 hr tablet Take 1 tablet (50 mg) by mouth daily 90 tablet 3     multivitamin, therapeutic with minerals (MULTI-VITAMIN) TABS Take 1 tablet by mouth daily.       pramoxine (PRAX) 1 % LOTN lotion Apply to affected skin of arms and legs 237 mL 1     triamcinolone (KENALOG) 0.1 % external cream Apply topically 2 times daily 454 g 0       Allergies   Allergen Reactions     Shellfish-Derived Products Anaphylaxis       Review of Systems:  -Skin Establ Pt: The patient denies any new rash, pruritus, or lesions that are symptomatic, changing or bleeding, except as per HPI.  -Constitutional: The patient is feeling generally well.    Physical exam:  Vitals: There were no vitals taken for this visit.  GEN: This is a well  developed, well-nourished male in no acute distress, in a pleasant mood.    SKIN: Focused examination of the left jaw and arms was performed.  - 1 cm pink depressed macule on the left angle of the jaw.   - Diffuse pink scaly papules with superficial erosion. More seem to be in healing phases with light pink depressed macules.    - No other lesions of concern on areas examined.       Impression/Plan:  1. Superficially invasive squamous cell carcinoma, left angle of the jaw   - Reviewed pathology report and nature of diagnosis.   - Risks, benefits, and process of Mohs micrographic surgery were discussed including possibility of damage to surrounding anatomical structures and infection. Patient is comfortable proceeding with the surgery; consent form was obtained. He will be scheduled at his convenience.  - No indication for pre-op antibiotics.  - Pre-op written instructions provided.     2. Prurigo nodularis, s/p biopsies 1/27/20   - Continue triamcinolone 0.1% cream to affected areas.   - Start mometasone ointment for more persistent lesions.  - Discussed vinegar soaks. Written instructions provided.     Follow-up as scheduled for MMS.       Staff Involved:    Scribe Disclosure  I, Tevin Amin, am serving as a scribe to document services personally performed by Dr. Jass John DO, based on data collection and the provider's statements to me.     Provider Disclosure:   The documentation recorded by the scribe accurately reflects the services I personally performed and the decisions made by me.    Jass John DO    Department of Dermatology  [facility]: Phone: [phone], Fax:[phone]  [facility]: Phone: [phone], Fax: [phone]

## 2020-02-10 NOTE — TELEPHONE ENCOUNTER
----- Message from Jass John MD sent at 2/10/2020 12:19 PM CST -----  Will discuss results with patient at today's follow up. If he does not show, please call with results.     Biopsies on the right tricep, behind his right ear, and the right thumb base were all prurigo nodules. We will discuss treatment options during his follow up visit.   The spot on his left angle of the jaw was SCC. My treatment recommendation is Mohs.     Thank you.

## 2020-02-10 NOTE — TELEPHONE ENCOUNTER
Notes recorded by Amelie Kelly LPN on 2/10/2020 at 3:31 PM CST  Patient seen in clinic today and results discussed with patient. Patient scheduled for MOHS 3-3-2020.      Amelie Kelly LPN    ------    Notes recorded by Jass Ames MD on 2/10/2020 at 12:19 PM CST  Will discuss results with patient at today's follow up. If he does not show, please call with results.     Biopsies on the right tricep, behind his right ear, and the right thumb base were all prurigo nodules. We will discuss treatment options during his follow up visit.   The spot on his left angle of the jaw was SCC. My treatment recommendation is Mohs.     Thank you.    Dermatological path order and indications   Order: [id number]   Status:  Final result   Visible to patient:  No (Not Released) Dx:  Rash and other nonspecific skin erupt...   Component 2wk ago   Copath Report Patient Name: MCKENNA LOTT   MR#: [id number]   Specimen #:    Collected: 1/27/2020   Received: 1/27/2020   Reported: 1/30/2020 12:13   Ordering Phy(s): JASS AMES     For improved result formatting, select 'View Enhanced Report Format' under    Linked Documents section.     SPECIMEN(S):   A: Skin, right distal tricep, punch   B: Skin, left angle of the jaw, shave   C: Skin, right retro auricular neck, shave   D: Skin, right base of the thumb, shave     FINAL DIAGNOSIS:   A. Skin, right distal tricep, punch:   - Features consistent with prurigo nodularis - (see description)     B. Skin, left angle of the jaw, shave:   - Superficially invasive squamous cell carcinoma  (see description)     C. Skin, right retro auricular neck, shave:   - Features consistent with prurigo nodularis, excoriated - (see   description)     D. Skin, right base of the thumb, shave:   - Features consistent with prurigo nodularis, ulcerated - (see   description)                Amelie Kelly LPN

## 2020-02-10 NOTE — LETTER
2/10/2020         RE: Owen Sahni  [address] [facility] [address]        Dear Colleague,    Thank you for referring your patient, Owen Sahni, to the [facility]. Please see a copy of my visit note below.    [facility] Dermatology Note      Dermatology Problem List:  1. Rash / non specific eruption affecting upper and lower extremities, stomach, upper back and buttocks. 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 Previous fungal scraping negative for mites.   - current t/x: Bleach baths, triamcinolone   - s/p permethrin cream without relief.   2. SCC, left angle of the jaw   - pending Mohs surgery     Encounter Date: Feb 10, 2020    CC:  Chief Complaint   Patient presents with     Rash     using triamcinolone and taking benadryl          History of Present Illness:  Mr. Owen Sahni is a 59 year old male who presents today for a rash recheck. He was last seen on 1/27/20 when several biopsies were taken that were consistent with prurigo nodularis. Another biopsy taken from the left angle of the jaw showed superficially invasive squamous cell carcinoma. This is his first diagnosed skin cancer. He asks about treatment for this.     He thinks the triamcinolone has helped with his prurigo nodules. He thinks it helped dry some of them out. He tries to apply as little as possible. Applies is usually once at night. Certain areas seem to have healed up pretty well. He says the lesions have not been too itchy anymore. He tries his best to avoid scratching them. He is relieved to hear this is not related to mites or bugs. He is otherwise feeling well. No other skin concerns addressed.       Past Medical History:   Patient Active Problem List   Diagnosis     Sleep apnea     Lumbosacral radiculopathy     CARDIOVASCULAR SCREENING; LDL GOAL LESS THAN 130     Abnormal EKG     SOB (shortness of breath)     Seafood allergy     AK (actinic keratosis)     Myalgia      Advanced directives, counseling/discussion     Essential hypertension with goal blood pressure less than 140/90     Benign essential hypertension     Hypertrophic obstructive cardiomyopathy (H)     Systolic murmur     Atrial fibrillation (H)     Long term (current) use of anticoagulants     Obesity (BMI 35.0-39.9) with comorbidity (H)     Past Medical History:   Diagnosis Date     Hypertension      Hypertrophic cardiomyopathy (H)      Past Surgical History:   Procedure Laterality Date     COLONOSCOPY  4/12/2012    Procedure:COLONOSCOPY; Colonoscopy, screening; Surgeon:ARANZA TEE; Location: OR       Social History:  Patient reports that he has never smoked. He has never used smokeless tobacco. He reports previous alcohol use. He reports that he does not use drugs.    Family History:  Family History   Problem Relation Age of Onset     Cancer Mother         breast     Cancer Father         Multiple myeloma     Hypertension Father      Arrhythmia No family hx of      Myocardial Infarction No family hx of      Diabetes No family hx of      Coronary Artery Disease No family hx of      Cerebrovascular Disease No family hx of        Medications:  Current Outpatient Medications   Medication Sig Dispense Refill     apixaban ANTICOAGULANT (ELIQUIS ANTICOAGULANT) 5 MG tablet Take 1 tablet (5 mg) by mouth 2 times daily 180 tablet 3     losartan (COZAAR) 50 MG tablet Take 1 tablet (50 mg) by mouth daily 90 tablet 1     metoprolol succinate ER (TOPROL-XL) 50 MG 24 hr tablet Take 1 tablet (50 mg) by mouth daily 90 tablet 3     multivitamin, therapeutic with minerals (MULTI-VITAMIN) TABS Take 1 tablet by mouth daily.       pramoxine (PRAX) 1 % LOTN lotion Apply to affected skin of arms and legs 237 mL 1     triamcinolone (KENALOG) 0.1 % external cream Apply topically 2 times daily 454 g 0       Allergies   Allergen Reactions     Shellfish-Derived Products Anaphylaxis       Review of Systems:  -Skin Establ Pt: The patient  denies any new rash, pruritus, or lesions that are symptomatic, changing or bleeding, except as per HPI.  -Constitutional: The patient is feeling generally well.    Physical exam:  Vitals: There were no vitals taken for this visit.  GEN: This is a well developed, well-nourished male in no acute distress, in a pleasant mood.    SKIN: Focused examination of the left jaw and arms was performed.  - 1 cm pink depressed macule on the left angle of the jaw.   - Diffuse pink scaly papules with superficial erosion. More seem to be in healing phases with light pink depressed macules.    - No other lesions of concern on areas examined.       Impression/Plan:  1. Superficially invasive squamous cell carcinoma, left angle of the jaw   - Reviewed pathology report and nature of diagnosis.   - Risks, benefits, and process of Mohs micrographic surgery were discussed including possibility of damage to surrounding anatomical structures and infection. Patient is comfortable proceeding with the surgery; consent form was obtained. He will be scheduled at his convenience.  - No indication for pre-op antibiotics.  - Pre-op written instructions provided.     2. Prurigo nodularis, s/p biopsies 1/27/20   - Continue triamcinolone 0.1% cream to affected areas.   - Start mometasone ointment for more persistent lesions.  - Discussed vinegar soaks. Written instructions provided.     Follow-up as scheduled for [facility].       Staff Involved:    Scribe Disclosure  I, Tevin Amin, am serving as a scribe to document services personally performed by Dr. Jass John DO, based on data collection and the provider's statements to me.     Provider Disclosure:   The documentation recorded by the scribe accurately reflects the services I personally performed and the decisions made by me.    Jass John DO    Department of Dermatology  [facility]: Phone: [phone],  Fax:[phone]  [facility]: Phone: [phone], Fax: [phone]        Again, thank you for allowing me to participate in the care of your patient.        Sincerely,        Jass John MD

## 2020-02-10 NOTE — PROGRESS NOTES
ANTICOAGULATION FOLLOW-UP CLINIC VISIT    Patient Name:  Owen Sahni  Date:  2/10/2020  Contact Type:  Face to Face    SUBJECTIVE:  Patient Findings     Comments:   Patient denies missed dose. States nothing has changed. States sets up pills once a week. Plans to use supply of warfarin and then switch to Eliquis if not too costly. States has not checked on cost yet but did  savings card. Has follow up apt today with dermatology for bx done last week. weekly dose increased.        Clinical Outcomes     Comments:   Patient denies missed dose. States nothing has changed. States sets up pills once a week. Plans to use supply of warfarin and then switch to Eliquis if not too costly. States has not checked on cost yet but did  savings card. Has follow up apt today with dermatology for bx done last week. weekly dose increased.           OBJECTIVE    INR Protime   Date Value Ref Range Status   02/10/2020 1.6 (A) 0.86 - 1.14 Final       ASSESSMENT / PLAN  INR assessment SUB    Recheck INR In: 2 WEEKS    INR Location Clinic      Anticoagulation Summary  As of 2/10/2020    INR goal:   2.0-3.0   TTR:   38.7 % (6.7 mo)   INR used for dosin.6! (2/10/2020)   Warfarin maintenance plan:   5 mg (5 mg x 1) every Thu; 7.5 mg (5 mg x 1.5) all other days   Full warfarin instructions:   5 mg every Thu; 7.5 mg all other days   Weekly warfarin total:   50 mg   Plan last modified:   Sushila Velazquez RN (2/10/2020)   Next INR check:   2020   Priority:   High   Target end date:       Indications    Atrial fibrillation (H) [I48.91]  Long term (current) use of anticoagulants [Z79.01]             Anticoagulation Episode Summary     INR check location:       Preferred lab:       Send INR reminders to:   VIDA EPPERSON    Comments:         Anticoagulation Care Providers     Provider Role Specialty Phone number    Evelio Oliver MD Referring Cardiology [phone]            See the Encounter Report to view  Anticoagulation Flowsheet and Dosing Calendar (Go to Encounters tab in chart review, and find the Anticoagulation Therapy Visit)        Sushila Velazquez RN

## 2020-02-12 RX ORDER — WARFARIN SODIUM 5 MG/1
TABLET ORAL
Qty: 180 TABLET | Refills: 0 | OUTPATIENT
Start: 2020-02-12

## 2020-02-26 ENCOUNTER — ANTICOAGULATION THERAPY VISIT (OUTPATIENT)
Dept: NURSING | Facility: CLINIC | Age: 60
End: 2020-02-26
Payer: COMMERCIAL

## 2020-02-26 DIAGNOSIS — Z79.01 LONG TERM (CURRENT) USE OF ANTICOAGULANTS: ICD-10-CM

## 2020-02-26 DIAGNOSIS — I48.91 ATRIAL FIBRILLATION, UNSPECIFIED TYPE (H): ICD-10-CM

## 2020-02-26 LAB — INR POINT OF CARE: 4.1 (ref 0.86–1.14)

## 2020-02-26 PROCEDURE — 85610 PROTHROMBIN TIME: CPT | Mod: QW

## 2020-02-26 PROCEDURE — 36416 COLLJ CAPILLARY BLOOD SPEC: CPT

## 2020-02-26 NOTE — PROGRESS NOTES
ANTICOAGULATION FOLLOW-UP CLINIC VISIT    Patient Name:  Owen Sahni  Date:  2020  Contact Type:  Face to Face    SUBJECTIVE:  Patient Findings     Comments:   Supra today. Patient denies any identifiable changes that caused the supratherapeutic INR. States I don't do anything different. Took warfarin dose today. Is frustrated with insurance and billing issues. States is not going to switch to Eliquis until he gets insurance straightened out due to cost. Is scheduled for a derm procedure in 2 wks but says will reschedule that also if needs to. No bleeding concerns. Will hold dose tomorrow.           Clinical Outcomes     Comments:   Supra today. Patient denies any identifiable changes that caused the supratherapeutic INR. States I don't do anything different. Took warfarin dose today. Is frustrated with insurance and billing issues. States is not going to switch to Eliquis until he gets insurance straightened out due to cost. Is scheduled for a derm procedure in 2 wks but says will reschedule that also if needs to. No bleeding concerns. Will hold dose tomorrow.              OBJECTIVE    INR Protime   Date Value Ref Range Status   2020 4.1 (A) 0.86 - 1.14 Final       ASSESSMENT / PLAN  INR assessment SUPRA    Recheck INR In: 2 WEEKS    INR Location Clinic      Anticoagulation Summary  As of 2020    INR goal:   2.0-3.0   TTR:   38.8 % (7.2 mo)   INR used for dosin.1! (2020)   Warfarin maintenance plan:   5 mg (5 mg x 1) every Mon, Thu; 7.5 mg (5 mg x 1.5) all other days   Full warfarin instructions:   : Hold; Otherwise 5 mg every Mon, Thu; 7.5 mg all other days   Weekly warfarin total:   47.5 mg   Plan last modified:   Sushila Velazquez RN (2020)   Next INR check:   3/10/2020   Priority:   High   Target end date:       Indications    Atrial fibrillation (H) [I48.91]  Long term (current) use of anticoagulants [Z79.01]             Anticoagulation Episode Summary     INR check  location:       Preferred lab:       Send INR reminders to:   VIDA EPPERSON    Comments:         Anticoagulation Care Providers     Provider Role Specialty Phone number    Evelio Oliver MD Referring Cardiology [phone]            See the Encounter Report to view Anticoagulation Flowsheet and Dosing Calendar (Go to Encounters tab in chart review, and find the Anticoagulation Therapy Visit)        Sushila Velazquez RN

## 2020-02-27 ENCOUNTER — TELEPHONE (OUTPATIENT)
Dept: CARDIOLOGY | Facility: CLINIC | Age: 60
End: 2020-02-27

## 2020-02-27 NOTE — TELEPHONE ENCOUNTER
First IBA. Request was sent to Medical Record department. Jeremias Forman L.P.N.      2nd request from [facility]. Insurance request records to process clinic claim. Then 07/10/19 office visit and coumadin,HZTZ.,cozaar,and Eliquis rx. Sent to [facility]. Drop box.Request form scanned to chart

## 2020-03-06 ENCOUNTER — TELEPHONE (OUTPATIENT)
Dept: DERMATOLOGY | Facility: CLINIC | Age: 60
End: 2020-03-06

## 2020-03-06 NOTE — TELEPHONE ENCOUNTER
Due to insurance patient states that he is needing to push out his surgery. Patient requesting to be scheduled into April. Writer reviewed risks of pushing out surgery with area growing, being a bigger surgery and a bigger scar to which patient verbalized understanding. Patient had no further questions or concerns and scheduled for 4-    Amelie Kelly LPN

## 2020-03-06 NOTE — TELEPHONE ENCOUNTER
[facility] Call Center    Phone Message    May a detailed message be left on voicemail: yes     Reason for Call: Other: Patient needs to reschedule his 3/9 appt to later due to insurance. Please advise     Action Taken: Message routed to:  Adult Clinics: Dermatology p [phone]

## 2020-03-07 DIAGNOSIS — I48.0 PAF (PAROXYSMAL ATRIAL FIBRILLATION) (H): ICD-10-CM

## 2020-03-07 DIAGNOSIS — I10 ESSENTIAL HYPERTENSION WITH GOAL BLOOD PRESSURE LESS THAN 140/90: ICD-10-CM

## 2020-03-10 ENCOUNTER — ANTICOAGULATION THERAPY VISIT (OUTPATIENT)
Dept: NURSING | Facility: CLINIC | Age: 60
End: 2020-03-10
Payer: COMMERCIAL

## 2020-03-10 ENCOUNTER — TELEPHONE (OUTPATIENT)
Dept: CARDIOLOGY | Facility: CLINIC | Age: 60
End: 2020-03-10

## 2020-03-10 DIAGNOSIS — I48.91 ATRIAL FIBRILLATION, UNSPECIFIED TYPE (H): ICD-10-CM

## 2020-03-10 DIAGNOSIS — I48.91 ATRIAL FIBRILLATION (H): ICD-10-CM

## 2020-03-10 DIAGNOSIS — Z79.01 LONG TERM (CURRENT) USE OF ANTICOAGULANTS: ICD-10-CM

## 2020-03-10 LAB — INR POINT OF CARE: 1.7 (ref 0.86–1.14)

## 2020-03-10 PROCEDURE — 85610 PROTHROMBIN TIME: CPT | Mod: QW

## 2020-03-10 PROCEDURE — 99207 ZZC NO CHARGE NURSE ONLY: CPT

## 2020-03-10 PROCEDURE — 36416 COLLJ CAPILLARY BLOOD SPEC: CPT

## 2020-03-10 RX ORDER — WARFARIN SODIUM 5 MG/1
5 TABLET ORAL DAILY
Qty: 1 TABLET | Refills: 0 | COMMUNITY
Start: 2020-03-10 | End: 2020-05-05

## 2020-03-10 RX ORDER — WARFARIN SODIUM 5 MG/1
TABLET ORAL
Qty: 180 TABLET | Refills: 0 | OUTPATIENT
Start: 2020-03-10

## 2020-03-10 RX ORDER — LOSARTAN POTASSIUM 50 MG/1
TABLET ORAL
Qty: 90 TABLET | Refills: 0 | Status: SHIPPED | OUTPATIENT
Start: 2020-03-10 | End: 2020-09-30

## 2020-03-10 NOTE — TELEPHONE ENCOUNTER
Discussed refill request with patient. He continues on warfarin until he can get insurance issues resolved and then will pursue switching to Eliquis. Patient states does not need refill of warfarin at this time. Refill denied.  Sushila Velazquez RN

## 2020-03-10 NOTE — PROGRESS NOTES
ANTICOAGULATION FOLLOW-UP CLINIC VISIT    Patient Name:  Owen Sahni  Date:  3/10/2020  Contact Type:  Face to Face    SUBJECTIVE:  Patient Findings     Comments:   Denies any reason for sub result. Still has not resolved insurance issues and will continue on warfarin until resolved Then will see if Eliquis is covered in future.  INR has not been stable and he denies any changes from week to week.         Clinical Outcomes     Comments:   Denies any reason for sub result. Still has not resolved insurance issues and will continue on warfarin until resolved Then will see if Eliquis is covered in future.  INR has not been stable and he denies any changes from week to week.            OBJECTIVE    INR Protime   Date Value Ref Range Status   03/10/2020 1.7 (A) 0.86 - 1.14 Final       ASSESSMENT / PLAN  INR assessment SUB    Recheck INR In: 2 WEEKS    INR Location Clinic      Anticoagulation Summary  As of 3/10/2020    INR goal:   2.0-3.0   TTR:   39.0 % (7.6 mo)   INR used for dosin.7! (3/10/2020)   Warfarin maintenance plan:   5 mg (5 mg x 1) every Mon, Thu; 7.5 mg (5 mg x 1.5) all other days   Full warfarin instructions:   3/10: 10 mg; Otherwise 5 mg every Mon, Thu; 7.5 mg all other days   Weekly warfarin total:   47.5 mg   Plan last modified:   Sushila Velazquez RN (2020)   Next INR check:   3/23/2020   Priority:   High   Target end date:       Indications    Atrial fibrillation (H) [I48.91]  Long term (current) use of anticoagulants [Z79.01]             Anticoagulation Episode Summary     INR check location:       Preferred lab:       Send INR reminders to:   VIDA EPPERSON    Comments:         Anticoagulation Care Providers     Provider Role Specialty Phone number    Evelio Oliver MD Referring Cardiology [phone]            See the Encounter Report to view Anticoagulation Flowsheet and Dosing Calendar (Go to Encounters tab in chart review, and find the Anticoagulation Therapy  Visit)        Sushila Velazquez RN

## 2020-03-10 NOTE — TELEPHONE ENCOUNTER
FYI only.  Patient reports issues with insurance and until he gets this resolved he is not switching to Eliquis due to cost.   Patient continues on warfarin although still not stable. Will continue to manage in INR clinic.  Sushila Velazquez RN

## 2020-03-31 ENCOUNTER — TELEPHONE (OUTPATIENT)
Dept: FAMILY MEDICINE | Facility: CLINIC | Age: 60
End: 2020-03-31

## 2020-03-31 DIAGNOSIS — I48.91 ATRIAL FIBRILLATION, UNSPECIFIED TYPE (H): Primary | ICD-10-CM

## 2020-03-31 DIAGNOSIS — Z79.01 LONG TERM (CURRENT) USE OF ANTICOAGULANTS: ICD-10-CM

## 2020-03-31 NOTE — TELEPHONE ENCOUNTER
No show for last INR. Called and scheduled for lab apt tomorrow. Informed all INR being done in lab and he will get a call with results. Verbalized understanding. Sushila Velazquez RN

## 2020-04-07 ENCOUNTER — ANTICOAGULATION THERAPY VISIT (OUTPATIENT)
Dept: NURSING | Facility: CLINIC | Age: 60
End: 2020-04-07

## 2020-04-07 DIAGNOSIS — Z79.01 LONG TERM (CURRENT) USE OF ANTICOAGULANTS: ICD-10-CM

## 2020-04-07 DIAGNOSIS — I48.91 ATRIAL FIBRILLATION, UNSPECIFIED TYPE (H): ICD-10-CM

## 2020-04-07 LAB
CAPILLARY BLOOD COLLECTION: NORMAL
INR PPP: 2.1 (ref 0.86–1.14)

## 2020-04-07 PROCEDURE — 99207 ZZC NO CHARGE NURSE ONLY: CPT

## 2020-04-07 PROCEDURE — 85610 PROTHROMBIN TIME: CPT | Performed by: FAMILY MEDICINE

## 2020-04-07 PROCEDURE — 36416 COLLJ CAPILLARY BLOOD SPEC: CPT | Performed by: FAMILY MEDICINE

## 2020-04-07 NOTE — PROGRESS NOTES
ANTICOAGULATION FOLLOW-UP CLINIC VISIT    Patient Name:  Owen Sahni  Date:  2020  Contact Type:  Telephone    SUBJECTIVE:  Patient Findings     Comments:   Therapeutic. Denies any changes or concerns. States sets up weekly pill box each week and has not missed any doses. Continue same. Verbalized understanding.         Clinical Outcomes     Negatives:   Major bleeding event, Thromboembolic event, Anticoagulation-related hospital admission, Anticoagulation-related ED visit, Anticoagulation-related fatality    Comments:   Therapeutic. Denies any changes or concerns. States sets up weekly pill box each week and has not missed any doses. Continue same. Verbalized understanding.            OBJECTIVE    INR   Date Value Ref Range Status   2020 2.10 (H) 0.86 - 1.14 Final     Comment:     PERFORMED ON "[employer]       ASSESSMENT / PLAN  INR assessment THER    Recheck INR In: 4 WEEKS    INR Location Clinic      Anticoagulation Summary  As of 2020    INR goal:   2.0-3.0   TTR:   37.4 % (8.6 mo)   INR used for dosin.10 (2020)   Warfarin maintenance plan:   5 mg (5 mg x 1) every Mon, Thu; 7.5 mg (5 mg x 1.5) all other days   Full warfarin instructions:   5 mg every Mon, Thu; 7.5 mg all other days   Weekly warfarin total:   47.5 mg   No change documented:   Sushila Velazquez RN   Plan last modified:   Sushila Velazquez RN (2020)   Next INR check:   2020   Priority:   High   Target end date:       Indications    Atrial fibrillation (H) [I48.91]  Long term (current) use of anticoagulants [Z79.01]             Anticoagulation Episode Summary     INR check location:       Preferred lab:       Send INR reminders to:   VIDA EPPERSON    Comments:         Anticoagulation Care Providers     Provider Role Specialty Phone number    Evelio Oliver MD Referring Cardiology [phone]            See the Encounter Report to view Anticoagulation Flowsheet and Dosing Calendar (Go to Encounters tab in  chart review, and find the Anticoagulation Therapy Visit)        Sushila Velazquez RN

## 2020-05-05 ENCOUNTER — ANTICOAGULATION THERAPY VISIT (OUTPATIENT)
Dept: NURSING | Facility: CLINIC | Age: 60
End: 2020-05-05

## 2020-05-05 DIAGNOSIS — I48.91 ATRIAL FIBRILLATION, UNSPECIFIED TYPE (H): ICD-10-CM

## 2020-05-05 DIAGNOSIS — Z79.01 LONG TERM (CURRENT) USE OF ANTICOAGULANTS: ICD-10-CM

## 2020-05-05 DIAGNOSIS — I48.91 ATRIAL FIBRILLATION, UNSPECIFIED TYPE (H): Primary | ICD-10-CM

## 2020-05-05 LAB
CAPILLARY BLOOD COLLECTION: NORMAL
INR PPP: 2.1 (ref 0.86–1.14)

## 2020-05-05 PROCEDURE — 36416 COLLJ CAPILLARY BLOOD SPEC: CPT | Performed by: FAMILY MEDICINE

## 2020-05-05 PROCEDURE — 99207 ZZC NO CHARGE NURSE ONLY: CPT

## 2020-05-05 PROCEDURE — 85610 PROTHROMBIN TIME: CPT | Performed by: FAMILY MEDICINE

## 2020-05-05 RX ORDER — WARFARIN SODIUM 5 MG/1
5 TABLET ORAL DAILY
Qty: 114 TABLET | Refills: 1 | Status: SHIPPED | OUTPATIENT
Start: 2020-05-05 | End: 2020-10-26

## 2020-05-05 NOTE — TELEPHONE ENCOUNTER
Jantoven    Pt is requesting a refill of Jantoven.  He is a  and needs a refill before his next trip tomorrow night. Pt states current dose is Jantoven 10mg daily.  Please send script to [facility] if approved.     Thank you,  Darlene Melendez, [facility]  [phone]

## 2020-05-05 NOTE — PROGRESS NOTES
ANTICOAGULATION FOLLOW-UP CLINIC VISIT    Patient Name:  Owen Sahni  Date:  2020  Contact Type:  Telephone    SUBJECTIVE:  Patient Findings     Comments:   Therapeutic. Discussed results with patient. He denies any change or concerns. Can't tell me what his dose is but says it is the same as he has been taking for long time and is written on card at home. Still frustrated with getting medical insurance issues resolved. Requests refill to [facility] and this was sent. Continue same dose.         Clinical Outcomes     Negatives:   Major bleeding event, Thromboembolic event, Anticoagulation-related hospital admission, Anticoagulation-related ED visit, Anticoagulation-related fatality    Comments:   Therapeutic. Discussed results with patient. He denies any change or concerns. Can't tell me what his dose is but says it is the same as he has been taking for long time and is written on card at home. Still frustrated with getting medical insurance issues resolved. Requests refill to [facility] and this was sent. Continue same dose.            OBJECTIVE    INR   Date Value Ref Range Status   2020 2.10 (H) 0.86 - 1.14 Final     Comment:     This test is intended for monitoring Coumadin therapy.  Results are not   accurate in patients with prolonged INR due to factor deficiency.         ASSESSMENT / PLAN  INR assessment THER    Recheck INR In: 5 WEEKS    INR Location Clinic      Anticoagulation Summary  As of 2020    INR goal:   2.0-3.0   TTR:   43.6 % (9.5 mo)   INR used for dosin.10 (2020)   Warfarin maintenance plan:   5 mg (5 mg x 1) every Mon, Thu; 7.5 mg (5 mg x 1.5) all other days   Full warfarin instructions:   5 mg every Mon, Thu; 7.5 mg all other days   Weekly warfarin total:   47.5 mg   No change documented:   Sushila Velazquez, RN   Plan last modified:   Sushila Velazquez RN (2020)   Next INR check:   2020   Priority:   Maintenance   Target end date:       Indications     Atrial fibrillation (H) [I48.91]  Long term (current) use of anticoagulants [Z79.01]             Anticoagulation Episode Summary     INR check location:       Preferred lab:       Send INR reminders to:   VIDA EPPERSON    Comments:         Anticoagulation Care Providers     Provider Role Specialty Phone number    Evelio Oliver MD Referring Cardiology [phone]            See the Encounter Report to view Anticoagulation Flowsheet and Dosing Calendar (Go to Encounters tab in chart review, and find the Anticoagulation Therapy Visit)        Sushila Velazquez RN

## 2020-05-05 NOTE — TELEPHONE ENCOUNTER
Patient never started apixavan due to cost and has been taking warfarin. Warfarin dose is 5 mg m,th and 7.5 mg rest of week days. Discussed with patient and refill sent to [facility] pharmacy instead of [facility]. Sushila Velazquez RN

## 2020-05-27 NOTE — PROGRESS NOTES
[facility]  Store and Forward and Telephone [phone]      Impression and Recommendations (Patient Counseled on the Following):  1. Superficially invasive squamous cell carcinoma, left angle of the jaw   - Reviewed with patient this is an untreated cancer that can move to the lymph nodes    2. Prurigo nodularis with significant element of excoriating, s/p biopsies 1/27/20 and pruritus work up pending. Reviewed nature with patient.   -continue bleach baths 3 times weekly  -Start ceterizine once daily, this can make you drowsy, do not do when driving  - Continue triamcinolone 0.1% cream to affected areas.   - With his next usual INR-Pruritus work up to be obtained and as follows: CBC with diff, LFTs, alk phosph, bili, LDH, hepatitis C and B testing, HIV, TSH with reflex T4, ferritin, SPEP, BUN/CR  -recommend Keeping nails short, wearing gloves at night, and wearing bandages over the lesions that itch the most can limit the damage from scratching.   -follow up in 2 weeks, pt may cancel if doing well and extend to 4 week follow up and treated his abdomen and legs    Follow-up:   Follow-up with dermatology in approximately 2 weeks photos and phone. Earlier for new or changing lesions or rash.      Staff only:    Amy Oakley MD    Department of Dermatology  [facility]: Phone: [phone], Fax:[phone]  [facility]: Phone: [phone], Fax: [phone]        _____________________________________________________________________________    Dermatology Problem List:  1. 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   -s/p shave biopsy on the right retro auricular neck  and right base of the  thumb   -Fungal scraping negative for mites on 1/27/20   -Current t/x: Bleach baths  -s/p permethrin cream without relief.   2. SCC, left angle of the jaw, s/p biopsy 1/27/20    Encounter Date: May 28, 2020    CC:   Chief Complaint   Patient presents with     Derm Problem     Prurigo nodularis - upper and lower extremities, stomach, upper back  f/u. Previous fungal scraping negative for mites.       History of Present Illness:  I have reviewed the teledermatology information and the nursing intake corresponding to this issue. Owen Sahni is a 59 year old male who presents via teledermatology for skin eruption on the extremtiies , face and trunk. Reports milo magan for 1 year. Starts as a spot the head of a pin. He feels he has scabies. IF he can get them out of his skin before burrowing in then it works. He is itchy at these spots but no everywhere. Permtherin treatment did not help X1.       ROS: Patient is generally feeling well today      Physical Examination:  General: Well-appearing , appropriately-developed individual.  Skin: Focused examination within the teledermatology photograph(s) including scalp, extremities and trunk was performed.   -erythematous erosions and superficial  Ulcerations and scattered on the neck, extremities and trunk along with scalp and left submental lesions, few scars intermixed    Labs:  NA    Past Medical History:   Patient Active Problem List   Diagnosis     Sleep apnea     Lumbosacral radiculopathy     CARDIOVASCULAR SCREENING; LDL GOAL LESS THAN 130     Abnormal EKG     SOB (shortness of breath)     Seafood allergy     AK (actinic keratosis)     Myalgia     Advanced directives, counseling/discussion     Essential hypertension with goal blood pressure less than 140/90     Benign essential hypertension     Hypertrophic obstructive cardiomyopathy (H)     Systolic murmur     Atrial fibrillation (H)     Long term (current) use of anticoagulants     Obesity (BMI 35.0-39.9) with  comorbidity (H)     Past Medical History:   Diagnosis Date     Hypertension      Hypertrophic cardiomyopathy (H)      Past Surgical History:   Procedure Laterality Date     COLONOSCOPY  4/12/2012    Procedure:COLONOSCOPY; Colonoscopy, screening; Surgeon:ARANZA TEE; Location: OR       Social History:  Patient reports that he has never smoked. He has never used smokeless tobacco. He reports previous alcohol use. He reports that he does not use drugs.     Family History:  Family History   Problem Relation Age of Onset     Cancer Mother         breast     Cancer Father         Multiple myeloma     Hypertension Father      Arrhythmia No family hx of      Myocardial Infarction No family hx of      Diabetes No family hx of      Coronary Artery Disease No family hx of      Cerebrovascular Disease No family hx of    no family hx melanoma    Medications:  Current Outpatient Medications   Medication     losartan (COZAAR) 50 MG tablet     metoprolol succinate ER (TOPROL-XL) 50 MG 24 hr tablet     multivitamin, therapeutic with minerals (MULTI-VITAMIN) TABS     pramoxine (PRAX) 1 % LOTN lotion     triamcinolone (KENALOG) 0.1 % external cream     warfarin ANTICOAGULANT (COUMADIN) 5 MG tablet     No current facility-administered medications for this visit.    losartan started march 2018  Metoprolol 6/2018 per records but patient states earlier   Warfarin 8/2019    Allergies   Allergen Reactions     Shellfish-Derived Products Anaphylaxis         _____________________________________________________________________________    Teledermatology information:  - Location of patient in [place]: Home  - Patient presented as: return  - Location of teledermatologist:  ([facility] )  - Reason teledermatology is appropriate:  of National Emergency Regarding Coronavirus disease (COVID 19) Outbreak  - Image quality and interpretability: acceptable  - Physician has received verbal consent for a Video/Photos  Visit from the patient? Yes  - In-person dermatology visit recommendation: no  - Date of images: 5/27/2020  - Service start time:2:08pm  - Service end time:2:46pm  - Date of report: 5/28/2020

## 2020-05-28 ENCOUNTER — VIRTUAL VISIT (OUTPATIENT)
Dept: DERMATOLOGY | Facility: CLINIC | Age: 60
End: 2020-05-28
Payer: COMMERCIAL

## 2020-05-28 DIAGNOSIS — L28.1 PRURIGO NODULARIS: Primary | ICD-10-CM

## 2020-05-28 DIAGNOSIS — R21 RASH AND OTHER NONSPECIFIC SKIN ERUPTION: ICD-10-CM

## 2020-05-28 PROCEDURE — 99213 OFFICE O/P EST LOW 20 MIN: CPT | Mod: 95 | Performed by: DERMATOLOGY

## 2020-05-28 RX ORDER — CLOBETASOL PROPIONATE 0.5 MG/G
OINTMENT TOPICAL
Qty: 120 G | Refills: 0 | Status: SHIPPED | OUTPATIENT
Start: 2020-05-28

## 2020-05-28 RX ORDER — TRIAMCINOLONE ACETONIDE 1 MG/G
CREAM TOPICAL 2 TIMES DAILY
Qty: 454 G | Refills: 0 | Status: SHIPPED | OUTPATIENT
Start: 2020-05-28 | End: 2020-06-16

## 2020-05-28 RX ORDER — CETIRIZINE HYDROCHLORIDE 10 MG/1
10 TABLET ORAL DAILY
Qty: 60 TABLET | Refills: 0 | Status: SHIPPED | OUTPATIENT
Start: 2020-05-28 | End: 2020-06-16

## 2020-05-28 ASSESSMENT — PAIN SCALES - GENERAL: PAINLEVEL: MODERATE PAIN (5)

## 2020-05-28 NOTE — LETTER
5/28/2020         RE: Owen Sahni  [address] [address] [id number]        Dear Colleague,    Thank you for referring your patient, Owen Sahni, to the [facility]. Please see a copy of my visit note below.                                    [facility]  Store and Forward and Telephone [phone]      Impression and Recommendations (Patient Counseled on the Following):  1. Superficially invasive squamous cell carcinoma, left angle of the jaw   - Reviewed with patient this is an untreated cancer that can move to the lymph nodes    2. Prurigo nodularis with significant element of excoriating, s/p biopsies 1/27/20 and pruritus work up pending. Reviewed nature with patient.   -continue bleach baths 3 times weekly  -Start ceterizine once daily, this can make you drowsy, do not do when driving  - Continue triamcinolone 0.1% cream to affected areas.   - With his next usual INR-Pruritus work up to be obtained and as follows: CBC with diff, LFTs, alk phosph, bili, LDH, hepatitis C and B testing, HIV, TSH with reflex T4, ferritin, SPEP, BUN/CR  -recommend Keeping nails short, wearing gloves at night, and wearing bandages over the lesions that itch the most can limit the damage from scratching.   -follow up in 2 weeks, pt may cancel if doing well and extend to 4 week follow up and treated his abdomen and legs    Follow-up:   Follow-up with dermatology in approximately 2 weeks photos and phone. Earlier for new or changing lesions or rash.      Staff only:    Amy Oakley MD    Department of Dermatology  [facility]: Phone: [phone], Fax:[phone]  [facility]: Phone: [phone], Fax: [phone]        _____________________________________________________________________________    Dermatology Problem List:  1. Skin eruption  consistent with prurigo nodularis on boipsy affecting upper and lower extremities, stomach, upper back and buttocks  -s/p punch biopsy from the right distal tricep on 1/27/20.   -s/p shave biopsy on the right retro auricular neck  and right base of the thumb   -Fungal scraping negative for mites on 1/27/20   -Current t/x: Bleach baths  -s/p permethrin cream without relief.   2. SCC, left angle of the jaw, s/p biopsy 1/27/20    Encounter Date: May 28, 2020    CC:   Chief Complaint   Patient presents with     Derm Problem     Prurigo nodularis - upper and lower extremities, stomach, upper back  f/u. Previous fungal scraping negative for mites.       History of Present Illness:  I have reviewed the teledermatology information and the nursing intake corresponding to this issue. Owen Sahni is a 59 year old male who presents via teledermatology for skin eruption on the extremtiies , face and trunk. Reports milo magan for 1 year. Starts as a spot the head of a pin. He feels he has scabies. IF he can get them out of his skin before burrowing in then it works. He is itchy at these spots but no everywhere. Permtherin treatment did not help X1.       ROS: Patient is generally feeling well today      Physical Examination:  General: Well-appearing , appropriately-developed individual.  Skin: Focused examination within the teledermatology photograph(s) including scalp, extremities and trunk was performed.   -erythematous erosions and superficial  Ulcerations and scattered on the neck, extremities and trunk along with scalp and left submental lesions, few scars intermixed    Labs:  NA    Past Medical History:   Patient Active Problem List   Diagnosis     Sleep apnea     Lumbosacral radiculopathy     CARDIOVASCULAR SCREENING; LDL GOAL LESS THAN 130     Abnormal EKG     SOB (shortness of breath)     Seafood allergy     AK (actinic keratosis)     Myalgia     Advanced directives, counseling/discussion     Essential hypertension  with goal blood pressure less than 140/90     Benign essential hypertension     Hypertrophic obstructive cardiomyopathy (H)     Systolic murmur     Atrial fibrillation (H)     Long term (current) use of anticoagulants     Obesity (BMI 35.0-39.9) with comorbidity (H)     Past Medical History:   Diagnosis Date     Hypertension      Hypertrophic cardiomyopathy (H)      Past Surgical History:   Procedure Laterality Date     COLONOSCOPY  4/12/2012    Procedure:COLONOSCOPY; Colonoscopy, screening; Surgeon:ARANZA TEE; Location: OR       Social History:  Patient reports that he has never smoked. He has never used smokeless tobacco. He reports previous alcohol use. He reports that he does not use drugs.     Family History:  Family History   Problem Relation Age of Onset     Cancer Mother         breast     Cancer Father         Multiple myeloma     Hypertension Father      Arrhythmia No family hx of      Myocardial Infarction No family hx of      Diabetes No family hx of      Coronary Artery Disease No family hx of      Cerebrovascular Disease No family hx of    no family hx melanoma    Medications:  Current Outpatient Medications   Medication     losartan (COZAAR) 50 MG tablet     metoprolol succinate ER (TOPROL-XL) 50 MG 24 hr tablet     multivitamin, therapeutic with minerals (MULTI-VITAMIN) TABS     pramoxine (PRAX) 1 % LOTN lotion     triamcinolone (KENALOG) 0.1 % external cream     warfarin ANTICOAGULANT (COUMADIN) 5 MG tablet     No current facility-administered medications for this visit.    losartan started march 2018  Metoprolol 6/2018 per records but patient states earlier   Warfarin 8/2019    Allergies   Allergen Reactions     Shellfish-Derived Products Anaphylaxis         _____________________________________________________________________________    Teledermatology information:  - Location of patient in [place]: Home  - Patient presented as: return  - Location of teledermatologist:  [facility] )  - Reason teledermatology is appropriate:  of National Emergency Regarding Coronavirus disease (COVID 19) Outbreak  - Image quality and interpretability: acceptable  - Physician has received verbal consent for a Video/Photos Visit from the patient? Yes  - In-person dermatology visit recommendation: no  - Date of images: 5/27/2020  - Service start time:2:08pm  - Service end time:2:46pm  - Date of report: 5/28/2020       Patient scheduled f/u.  AVS and handout mailed to patient home address in [employer].  [employer], MEDICAL ASSISTANT       Again, thank you for allowing me to participate in the care of your patient.        Sincerely,        Amy Oakley MD

## 2020-05-28 NOTE — Clinical Note
Phtoso and phone in 2-3 weeks . Mail AVS and also handout from [facility] on prurigo nodularis : [email or web address]    Document in chart after mailing

## 2020-05-28 NOTE — PATIENT INSTRUCTIONS
[facility] Teledermatology Visit    Thank you for allowing us to participate in your care. Your findings, instructions and follow-up plan are as follows:  -Start ceterizine once daily, this can make you drowsy, do not do when driving  -Keeping nails short and wearing bandages over the lesions that itch the most can limit the damage from scratching.  -use your old triamcinolone or new clobetasol twice daily on the bumps with saran wrap in the pm and bandaids in the am. Start with arms for first 2 weeks. If doing well then try on abdomen, then thighs  -schedule skin cancer surgery    When should I call my doctor?    If you are worsening or not improving, please, contact us or seek urgent care as noted below.     Who should I call with questions (adults)?    [facility] (adult and pediatric): [phone]     [facility] (adult): [phone]    For urgent needs outside of business hours call the [facility] at [phone] and ask for the dermatology resident on call    If this is a medical emergency and you are unable to reach an ER, Call [phone]      Who should I call with questions (pediatric)?  [facility]- Pediatric Dermatology  Dr. Sharonda Guerrero, Dr. Sukhdev Servin, Dr. Niya Dawson, Rebekah Petersen, SAMANTHA Hodges, Dr. Liz Hogan & Dr. Stef Chun  Non Urgent  Nurse Triage Line; [phone]- Susan and Lizzy HOPKINS Care Coordinators   [place] (/Complex ) [phone]    If you need a prescription refill, please contact your pharmacy. Refills are approved or denied by our Physicians during normal business hours, Monday through Fridays  Per office policy, refills will not be granted if you have not been seen within the past year (or sooner depending on your child's condition)    Scheduling Information:  Pediatric Appointment Scheduling and Call Center [phone]  [phone]  Radiology Scheduling- [phone]  Sedation Unit Scheduling- [phone]  [facility] Scheduling- General [phone]; Pediatric Dermatology [phone]  Main  Services: [phone]  Macedonian: [phone]  Montserratian: [phone]  Hmong/Catarino/Cuco: [phone]  Preadmission Nursing Department Fax Number: [phone] (Fax all pre-operative paperwork to this number)    For urgent matters arising during evenings, weekends, or holidays that cannot wait for normal business hours please call [phone] and ask for the Dermatology Resident On-Call to be paged.

## 2020-05-28 NOTE — NURSING NOTE
"Teledermatology Nurse Call for RETURN patients seen within the last 3 years:    The patient was contacted by phone and we reviewed, \"Due to the coronavirus pandemic, we are calling to review your visit and offer you a teledermatology visit where you send in photos via [employer]. These photos will be seen by an MD or JOSE CARLOS. This will be billed to you and your insurance.\"  The patient was also told that \"a teledermatology visit is not as thorough as an in-person visit and that the quality of the photograph sent may not be of the same quality as that taken by the dermatology clinic, but the patient would like to proceed with an teledermatology because of National Emergency Regarding Coronavirus disease (COVID 19) Outbreak.\"  \"If a prescription is necessary we can send it directly to your pharmacy.  If lab work is needed we can place an order for that and you can then stop by our lab to have the test done at a later time.\"    The patient understood that they may receive a call from the clinic to review additional history, may still be instructed to come to clinic even after photo review and be billed for both visits with an MD. They were told that a photo assessment does not replace an in person skin exam. The patient understood that teledermatology is not for urgent issues and would require up to 3 business days for review. The patient denied skin pain, fever, mucosal symptoms (lesions, blisters, sores in the mouth, nose, eyes, or genitals)  IF PATIENT ENDORSES ANY OF THESE STOP AND PAGE  ON CALL ATTENDING. IF OTHER POSSIBLY URGENT SYMPTOMS THEN PAGE PHYSICIAN YOU ARE SCHEDULING WITH OR ON CALL IF NO ANSWER.       The patient chose to:                                                                                                                                                                                                                    Consent to a teledermatology visit with [employer] photos. The patient understood " "they must upload a mychart photo for this visit to be completed. They indicated that the photo will be taken at their home address(if other address please document here). Patient told nursing these are already uploaded .  The patient was instructed to take photos of all all areas of concern and all areas of any rash from near and far away.                                                                                                                                                                                                                               The patient is verified to be in the [facility] at their home address at the time of the encounter.     The physician must be notified by nurse if the patient is not in the state at the time of the encounter. DELETE THIS PHRASE FROM NOTE AFTER COMPLETING IF NEEDED                                                                                                                                                                                                            Patient concerns for this return visit:   Chief Complaint   Patient presents with     Derm Problem     Prurigo nodularis - upper and lower extremities, stomach, upper back  f/u. Previous fungal scraping negative for mites.     Areas have worsened on belly, knees, and hips. In the last two weeks areas have spread to scalp. He voices constant itch and mild pain present on sores. He picks at the sores once it scabs. He is doing bleach baths every other day. He uses Triamcinolone everyday, but does not notice that Rx is doing anything. He is using benadryl cream x every other day. He is also taking benadryl; it helps a lot - \"the only thing that gives me relief\". He takes Benadryl BID. He is unable to take full body pictures as he states his gf is afraid to go near him, so there's no one to help with photos.     Nursing tasks completed  -Pharmacy preference was updated.  -The nurse has dropped in the " AVS information *(For adults the phrase is umdermhteleavs and for pediatrics it is their own) for the physician to route in the AVS.                                                                                                                                                                                                                         -The patient was told to contact the clinic if they have not received correspondence within 72 hours.

## 2020-05-28 NOTE — PROGRESS NOTES
Patient scheduled f/u.  AVS and handout mailed to patient home address in [facility].  [employer], MEDICAL ASSISTANT

## 2020-05-29 ENCOUNTER — TELEPHONE (OUTPATIENT)
Dept: DERMATOLOGY | Facility: CLINIC | Age: 60
End: 2020-05-29

## 2020-05-29 DIAGNOSIS — R21 RASH AND OTHER NONSPECIFIC SKIN ERUPTION: ICD-10-CM

## 2020-05-29 DIAGNOSIS — L28.1 PRURIGO NODULARIS: Primary | ICD-10-CM

## 2020-05-29 NOTE — TELEPHONE ENCOUNTER
----- Message from Jonelle Mccabe LPN sent at 5/29/2020 10:57 AM CDT -----  Regarding: RE: mohs surgery not scheduled    ----- Message -----  From: Ladonna Rothman  Sent: 5/29/2020  10:44 AM CDT  To: Jonelle Mccabe LPN  Subject: RE: mohs surgery not scheduled                   Forward back to [facility] DERM [facility]  ----- Message -----  From: Jonelle Mccabe LPN  Sent: 5/29/2020  10:41 AM CDT  To: Ladonna Rothman  Subject: FW: mohs surgery not scheduled                   Do you know who we need to send this to?  ----- Message -----  From: Amy Oakley MD  Sent: 5/28/2020   2:10 PM CDT  To: Jass John MD, #  Subject: mohs surgery not scheduled                       Patient has SCC in January still not treated. He is reporting insurance issues. Can we do a prior authorization for mohs surgery?      derm do not delete this message until the skin cancer is off.

## 2020-05-29 NOTE — TELEPHONE ENCOUNTER
Financial Counselor Review:    Procedure to be performed (include CPT code):Yes Mohs procedure- 17311 x1. If a second or more stages are needed, each stage is 61181. Mohs wound repair will likely be 24857.     If Mohs cost is prohibitive and he won't be able to have any treatment, an excision may be less expensive. The potential compromise is that excision cure rates are a little lower than Mohs but better than no treatment. The excision code is 68901. The same repair code, 51491 would be used too. A dermpath code is necessary for an excision 87152.     Diagnosis code (include ICD-10 code):invasive squamous cell carcinoma, C44.42     Medication order (include J code):NA     Medication dose and frequency:N/A    Cosmetic procedure/medication:No     Coverage information only:YES    Coverage and patient financial responsibility information:YES    Does patient need to be contacted by Financial Counselor:YES    Note: Do not use abbreviations and route encounter to [facility] derm pool.

## 2020-06-01 NOTE — TELEPHONE ENCOUNTER
Patient is good to go for procedure. No PA is required for through his coverage.    Call ref [id number]

## 2020-06-02 DIAGNOSIS — L28.1 PRURIGO NODULARIS: ICD-10-CM

## 2020-06-02 NOTE — TELEPHONE ENCOUNTER
[facility] Dermatologic Surgery Pre-Surgery Screening Note     Pre-screening Information:  Hx of Skin Cancer: No  Hx of Mohs Surgery: No  Transplant: No  Immunocompromised: No  Current Anticoagulant(s): Coumadin      Medications/Allergies  Medications and allergies review with patient: Yes     Current Outpatient Medications   Medication Sig Dispense Refill     cetirizine (ZYRTEC) 10 MG tablet Take 1 tablet (10 mg) by mouth daily 60 tablet 0     clobetasol (TEMOVATE) 0.05 % external ointment Apply twice daily to bumps on arms and legs 120 g 0     losartan (COZAAR) 50 MG tablet TAKE 1 TABLET BY MOUTH EVERY DAY 90 tablet 0     metoprolol succinate ER (TOPROL-XL) 50 MG 24 hr tablet Take 1 tablet (50 mg) by mouth daily 90 tablet 3     multivitamin, therapeutic with minerals (MULTI-VITAMIN) TABS Take 1 tablet by mouth daily.       pramoxine (PRAX) 1 % LOTN lotion Apply to affected skin of arms and legs 237 mL 1     triamcinolone (KENALOG) 0.1 % external cream Apply topically 2 times daily 454 g 0     warfarin ANTICOAGULANT (COUMADIN) 5 MG tablet Take 1 tablet (5 mg) by mouth daily Taking 5 mg Monday and Thursday and 7.5 mg rest of week days. 114 tablet 1     Allergies   Allergen Reactions     Shellfish-Derived Products Anaphylaxis       Pertinent Labs:  Last Creatine:   Creatinine   Date Value Ref Range Status   06/03/2019 1.04 0.66 - 1.25 mg/dL Final     Last INR:   INR   Date Value Ref Range Status   05/05/2020 2.10 (H) 0.86 - 1.14 Final     Comment:     This test is intended for monitoring Coumadin therapy.  Results are not   accurate in patients with prolonged INR due to factor deficiency.         Other Questions:    Reminded patient to take any order prophylactic antibiotics 1 hour prior to appointment: n/a    If on a prescribed anticoagulant, advised patient to continue or check with prescribing provider: Pt on coumadin pt advised to have INR checked within 7 days of Mohs. Pt states he will call  to have that scheduled     If on an OTC anticoagulant/supplement, advised patient to hold these medications 10-14 days prior to surgery and resume 48 hrs after surgery: , n/a     Please be aware that this can be an all day procedure. Please bring your daily medications and food/cash. Encourage patient to eat prior to procedure(s). After care instructions were reviewed with patient.    If any positives, send to RN to initiate antibiotic prophylaxis protocol and/or anticoagulation management protocol    Reviewed by:    Hayley Stubbs RN

## 2020-06-02 NOTE — TELEPHONE ENCOUNTER
Pt called and scheduled on a Monday which is the day pt preferred and Mohs previsit screening completed..Letter sent Hayley Stubbs RN

## 2020-06-05 RX ORDER — CETIRIZINE HYDROCHLORIDE 10 MG/1
10 TABLET ORAL DAILY
Qty: 60 TABLET | Refills: 1 | OUTPATIENT
Start: 2020-06-05

## 2020-06-05 NOTE — TELEPHONE ENCOUNTER
5/28/20 Derm, refill process #1  cetirizine (ZYRTEC) 10 MG tablet       Last Written Prescription Date:  5/28/20  Last Fill Quantity: 60,   # refills: 0  Last Office Visit : 5/28/20  Future Office visit:  6/9/20    Routing refill request to provider for review/approval because:  Denied, sent 5/28/20 to [facility]#[id number]

## 2020-06-08 ENCOUNTER — ANTICOAGULATION THERAPY VISIT (OUTPATIENT)
Dept: NURSING | Facility: CLINIC | Age: 60
End: 2020-06-08

## 2020-06-08 DIAGNOSIS — R21 RASH AND OTHER NONSPECIFIC SKIN ERUPTION: ICD-10-CM

## 2020-06-08 DIAGNOSIS — L28.1 PRURIGO NODULARIS: ICD-10-CM

## 2020-06-08 DIAGNOSIS — Z79.01 LONG TERM (CURRENT) USE OF ANTICOAGULANTS: ICD-10-CM

## 2020-06-08 DIAGNOSIS — I48.91 ATRIAL FIBRILLATION, UNSPECIFIED TYPE (H): ICD-10-CM

## 2020-06-08 LAB
ALBUMIN SERPL-MCNC: 3.4 G/DL (ref 3.4–5)
ALP SERPL-CCNC: 74 U/L (ref 40–150)
ALT SERPL W P-5'-P-CCNC: 44 U/L (ref 0–70)
AST SERPL W P-5'-P-CCNC: 30 U/L (ref 0–45)
BASOPHILS # BLD AUTO: 0 10E9/L (ref 0–0.2)
BASOPHILS NFR BLD AUTO: 0.2 %
BILIRUB DIRECT SERPL-MCNC: 0.1 MG/DL (ref 0–0.2)
BILIRUB SERPL-MCNC: 0.3 MG/DL (ref 0.2–1.3)
BUN SERPL-MCNC: 20 MG/DL (ref 7–30)
CREAT SERPL-MCNC: 1 MG/DL (ref 0.66–1.25)
DIFFERENTIAL METHOD BLD: NORMAL
EOSINOPHIL # BLD AUTO: 0.3 10E9/L (ref 0–0.7)
EOSINOPHIL NFR BLD AUTO: 3.2 %
ERYTHROCYTE [DISTWIDTH] IN BLOOD BY AUTOMATED COUNT: 13.7 % (ref 10–15)
FERRITIN SERPL-MCNC: 440 NG/ML (ref 26–388)
GFR SERPL CREATININE-BSD FRML MDRD: 81 ML/MIN/{1.73_M2}
HCT VFR BLD AUTO: 44.4 % (ref 40–53)
HGB BLD-MCNC: 14.3 G/DL (ref 13.3–17.7)
INR PPP: 2.4 (ref 0.86–1.14)
LYMPHOCYTES # BLD AUTO: 1.5 10E9/L (ref 0.8–5.3)
LYMPHOCYTES NFR BLD AUTO: 17.1 %
MCH RBC QN AUTO: 29.6 PG (ref 26.5–33)
MCHC RBC AUTO-ENTMCNC: 32.2 G/DL (ref 31.5–36.5)
MCV RBC AUTO: 92 FL (ref 78–100)
MONOCYTES # BLD AUTO: 0.8 10E9/L (ref 0–1.3)
MONOCYTES NFR BLD AUTO: 9 %
NEUTROPHILS # BLD AUTO: 6.1 10E9/L (ref 1.6–8.3)
NEUTROPHILS NFR BLD AUTO: 70.5 %
PLATELET # BLD AUTO: 268 10E9/L (ref 150–450)
PROT SERPL-MCNC: 8.1 G/DL (ref 6.8–8.8)
RBC # BLD AUTO: 4.83 10E12/L (ref 4.4–5.9)
TSH SERPL DL<=0.005 MIU/L-ACNC: 1.48 MU/L (ref 0.4–4)
WBC # BLD AUTO: 8.6 10E9/L (ref 4–11)

## 2020-06-08 PROCEDURE — 84165 PROTEIN E-PHORESIS SERUM: CPT | Performed by: DERMATOLOGY

## 2020-06-08 PROCEDURE — 84443 ASSAY THYROID STIM HORMONE: CPT | Performed by: DERMATOLOGY

## 2020-06-08 PROCEDURE — 86803 HEPATITIS C AB TEST: CPT | Performed by: DERMATOLOGY

## 2020-06-08 PROCEDURE — 85025 COMPLETE CBC W/AUTO DIFF WBC: CPT | Performed by: DERMATOLOGY

## 2020-06-08 PROCEDURE — 82565 ASSAY OF CREATININE: CPT | Performed by: DERMATOLOGY

## 2020-06-08 PROCEDURE — 80076 HEPATIC FUNCTION PANEL: CPT | Performed by: DERMATOLOGY

## 2020-06-08 PROCEDURE — 82728 ASSAY OF FERRITIN: CPT | Performed by: DERMATOLOGY

## 2020-06-08 PROCEDURE — 00000402 ZZHCL STATISTIC TOTAL PROTEIN: Performed by: DERMATOLOGY

## 2020-06-08 PROCEDURE — 84520 ASSAY OF UREA NITROGEN: CPT | Performed by: DERMATOLOGY

## 2020-06-08 PROCEDURE — 99207 ZZC NO CHARGE NURSE ONLY: CPT

## 2020-06-08 PROCEDURE — 36415 COLL VENOUS BLD VENIPUNCTURE: CPT | Performed by: DERMATOLOGY

## 2020-06-08 PROCEDURE — 85610 PROTHROMBIN TIME: CPT | Performed by: FAMILY MEDICINE

## 2020-06-08 NOTE — PROGRESS NOTES
ANTICOAGULATION FOLLOW-UP CLINIC VISIT    Patient Name:  Owen Sahni  Date:  2020  Contact Type:  Telephone    SUBJECTIVE:  Patient Findings     Comments:   Therapeutic. Ongoing rash and seeing dermatology for this. No med changes. Having Moh's procedure on 6/15. Plan to continue same dose. Call if concerns or changes. Patient had to end call but will schedule next INR in about a month.        Clinical Outcomes     Negatives:   Major bleeding event, Thromboembolic event, Anticoagulation-related hospital admission, Anticoagulation-related ED visit, Anticoagulation-related fatality    Comments:   Therapeutic. Ongoing rash and seeing dermatology for this. No med changes. Having Moh's procedure on 6/15. Plan to continue same dose. Call if concerns or changes. Patient had to end call but will schedule next INR in about a month.           OBJECTIVE    Recent labs: (last 7 days)     20  1039   INR 2.40*       ASSESSMENT / PLAN  INR assessment THER    Recheck INR In: 4 WEEKS    INR Location Clinic      Anticoagulation Summary  As of 2020    INR goal:   2.0-3.0   TTR:   49.6 % (10.6 mo)   INR used for dosin.40 (2020)   Warfarin maintenance plan:   5 mg (5 mg x 1) every Mon, Thu; 7.5 mg (5 mg x 1.5) all other days   Full warfarin instructions:   5 mg every Mon, Thu; 7.5 mg all other days   Weekly warfarin total:   47.5 mg   No change documented:   Sushila Velazquez RN   Plan last modified:   Sushila Velazquez RN (2020)   Next INR check:   2020   Priority:   Maintenance   Target end date:       Indications    Atrial fibrillation (H) [I48.91]  Long term (current) use of anticoagulants [Z79.01]             Anticoagulation Episode Summary     INR check location:       Preferred lab:       Send INR reminders to:   VIDA EPPERSON    Comments:         Anticoagulation Care Providers     Provider Role Specialty Phone number    Evelio Oliver MD Referring Cardiology [phone]            See  the Encounter Report to view Anticoagulation Flowsheet and Dosing Calendar (Go to Encounters tab in chart review, and find the Anticoagulation Therapy Visit)    Dosage adjustment made based on physician directed care plan.    Sushila Velazquez RN

## 2020-06-09 ENCOUNTER — TELEPHONE (OUTPATIENT)
Dept: DERMATOLOGY | Facility: CLINIC | Age: 60
End: 2020-06-09

## 2020-06-09 LAB — HCV AB SERPL QL IA: NONREACTIVE

## 2020-06-09 NOTE — TELEPHONE ENCOUNTER
Notes recorded by Hayley Stubbs RN on 6/9/2020 at 12:12 PM CDT   Pt called and notified of results and 's recommendations. Appt on 6/11 confirmed with pt for a phone visit with photos. Pt had no further questions or concerns at this time..Hayley Stubbs RN     ------     Notes recorded by Amy Oakley MD on 6/9/2020 at 11:45 AM CDT   Labs r okay, some still pending.   I was supposed ot talk with patient today     Can we move his appt for an other telemed photos and phone appt

## 2020-06-09 NOTE — PROGRESS NOTES
[facility]  Store and Forward and Telephone [phone]      Impression and Recommendations (Patient Counseled on the Following):     1. Superficially invasive squamous cell carcinoma, left angle of the jaw   - s/p mohs  2. Prurigo nodularis with significant element of excoriating, s/p biopsies 1/27/20 and pruritus work up pending. Reviewed nature with patient. Now negative hep C.   -continue bleach baths 3 times weekly  -Start ceterizine once daily, this can make you drowsy, do not do when driving  - Continue triamcinolone 0.1% cream to affected areas.   - SPEP slightly elevated at 1.7, will send for PCP follow up but does not appear to be relevant  -recommend Keeping nails short, wearing gloves at night, and wearing bandages over the lesions that itch the most can limit the damage from scratching.   -follow up in 2 weeks, pt may cancel if doing well and extend to 4 week follow up and treated his abdomen and legs      Follow-up:   Follow-up with dermatology in approximately 2 weeks photos and phone. Earlier for new or changing lesions or rash.      Staff only:    Amy Oakley MD    Department of Dermatology  [facility]: Phone: [phone], Fax:[phone]  [facility]: Phone: [phone], Fax: [phone]        _____________________________________________________________________________       Dermatology Problem List:  1. 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   -s/p shave biopsy on the right retro auricular neck  and right base of the thumb   -Fungal scraping negative for mites on 1/27/20   -Current t/x: Bleach baths  -s/p permethrin cream without relief.   -work up with hepatitis, cbc with diff, tsh with reflex  T4 and ferritin negative  2. SCC, left angle of the jaw, s/p biopsy 1/27/20 now s/p MOhs    Encounter Date: Jun 16, 2020    CC:   Chief Complaint   Patient presents with     Derm Problem     Prurigo nodularis f/u.        History of Present Illness:  I have reviewed the teledermatology information and the nursing intake corresponding to this issue. Owen Sahni is a 59 year old male who presents via teledermatology for prurigo. At last visit start topical steroids and ceterizine. HE never started certizine. He didn't pick it up. He will go and pick it up.       ROS: Patient is generally feeling well today  But frusterated    Physical Examination:  General: Well-appearing  appropriately-developed individual.  Skin: Focused examination within the teledermatology photograph(s) including trunk and extremities was performed.   -erythematous and some excoriated macules on the occipital scale, extremity and abdomen    Labs:  Component      Latest Ref Rng & Units 6/8/2020   WBC      4.0 - 11.0 10e9/L 8.6   RBC Count      4.4 - 5.9 10e12/L 4.83   Hemoglobin      13.3 - 17.7 g/dL 14.3   Hematocrit      40.0 - 53.0 % 44.4   MCV      78 - 100 fl 92   MCH      26.5 - 33.0 pg 29.6   MCHC      31.5 - 36.5 g/dL 32.2   RDW      10.0 - 15.0 % 13.7   Platelet Count      150 - 450 10e9/L 268   % Neutrophils      % 70.5   % Lymphocytes      % 17.1   % Monocytes      % 9.0   % Eosinophils      % 3.2   % Basophils      % 0.2   Absolute Neutrophil      1.6 - 8.3 10e9/L 6.1   Absolute Lymphocytes      0.8 - 5.3 10e9/L 1.5   Absolute Monocytes      0.0 - 1.3 10e9/L 0.8   Absolute Eosinophils      0.0 - 0.7 10e9/L 0.3   Absolute Basophils      0.0 - 0.2 10e9/L 0.0   Diff Method       Automated Method   Albumin Fraction      3.7 - 5.1 g/dL PENDING   Alpha 1 Fraction      0.2 - 0.4 g/dL PENDING   Alpha 2 Fraction      0.5 - 0.9 g/dL PENDING   Beta Fraction      0.6 - 1.0 g/dL PENDING   Gamma Fraction      0.7 - 1.6 g/dL PENDING   Monoclonal  Peak      0.0 g/dL PENDING   ELP Interpretation:       PENDING   Bilirubin Direct      0.0 - 0.2 mg/dL 0.1   Bilirubin Total      0.2 - 1.3 mg/dL 0.3   Albumin      3.4 - 5.0 g/dL 3.4   Protein Total      6.8 - 8.8 g/dL 8.1   Alkaline Phosphatase      40 - 150 U/L 74   ALT      0 - 70 U/L 44   AST      0 - 45 U/L 30   Creatinine      0.66 - 1.25 mg/dL 1.00   GFR Estimate      >60 mL/min/1.73:m2 81   GFR Estimate If Black      >60 mL/min/1.73:m2 >90   Ferritin      26 - 388 ng/mL 440 (H)   TSH      0.40 - 4.00 mU/L 1.48   Urea Nitrogen      7 - 30 mg/dL 20   INR      0.86 - 1.14 2.40 (H)       Past Medical History:   Patient Active Problem List   Diagnosis     Sleep apnea     Lumbosacral radiculopathy     CARDIOVASCULAR SCREENING; LDL GOAL LESS THAN 130     Abnormal EKG     SOB (shortness of breath)     Seafood allergy     AK (actinic keratosis)     Myalgia     Advanced directives, counseling/discussion     Essential hypertension with goal blood pressure less than 140/90     Benign essential hypertension     Hypertrophic obstructive cardiomyopathy (H)     Systolic murmur     Atrial fibrillation (H)     Long term (current) use of anticoagulants     Obesity (BMI 35.0-39.9) with comorbidity (H)     Past Medical History:   Diagnosis Date     Hypertension      Hypertrophic cardiomyopathy (H)      Past Surgical History:   Procedure Laterality Date     COLONOSCOPY  4/12/2012    Procedure:COLONOSCOPY; Colonoscopy, screening; Surgeon:ARANZA TEE; Location: OR       Social History:  Patient reports that he has never smoked. He has never used smokeless tobacco. He reports previous alcohol use. He reports that he does not use drugs.    Family History:  Family History   Problem Relation Age of Onset     Cancer Mother         breast     Cancer Father         Multiple myeloma     Hypertension Father      Arrhythmia No family hx of      Myocardial Infarction No family hx of      Diabetes No family hx of      Coronary Artery  Disease No family hx of      Cerebrovascular Disease No family hx of        Medications:  Current Outpatient Medications   Medication     cetirizine (ZYRTEC) 10 MG tablet     clobetasol (TEMOVATE) 0.05 % external ointment     losartan (COZAAR) 50 MG tablet     metoprolol succinate ER (TOPROL-XL) 50 MG 24 hr tablet     multivitamin, therapeutic with minerals (MULTI-VITAMIN) TABS     pramoxine (PRAX) 1 % LOTN lotion     triamcinolone (KENALOG) 0.1 % external cream     warfarin ANTICOAGULANT (COUMADIN) 5 MG tablet     No current facility-administered medications for this visit.           Allergies   Allergen Reactions     Shellfish-Derived Products Anaphylaxis         _____________________________________________________________________________    Teledermatology information:  - Location of patient in [place]: Home  - Patient presented as: return  - Location of teledermatologist:  ([facility] )  - Reason teledermatology is appropriate:  of National Emergency Regarding Coronavirus disease (COVID 19) Outbreak  - Image quality and interpretability: acceptable  - Physician has received verbal consent for a Video/Photos Visit from the patient? Yes  - In-person dermatology visit recommendation: Yes fo rhis skin cancer  - Date of images: 6/16/2020  - Service start time:2:06pm  - Service end time: 2:16pm  - Date of report: 6/9/2020

## 2020-06-10 LAB
ALBUMIN SERPL ELPH-MCNC: 3.7 G/DL (ref 3.7–5.1)
ALPHA1 GLOB SERPL ELPH-MCNC: 0.3 G/DL (ref 0.2–0.4)
ALPHA2 GLOB SERPL ELPH-MCNC: 0.8 G/DL (ref 0.5–0.9)
B-GLOBULIN SERPL ELPH-MCNC: 1 G/DL (ref 0.6–1)
GAMMA GLOB SERPL ELPH-MCNC: 1.7 G/DL (ref 0.7–1.6)
M PROTEIN SERPL ELPH-MCNC: 0 G/DL
PROT PATTERN SERPL ELPH-IMP: ABNORMAL

## 2020-06-11 ENCOUNTER — TELEPHONE (OUTPATIENT)
Dept: DERMATOLOGY | Facility: CLINIC | Age: 60
End: 2020-06-11

## 2020-06-11 ASSESSMENT — PAIN SCALES - GENERAL: PAINLEVEL: MODERATE PAIN (4)

## 2020-06-11 NOTE — NURSING NOTE
"Teledermatology Nurse Call for RETURN patients seen within the last 3 years:    The patient was contacted by phone and we reviewed, \"Due to the coronavirus pandemic, we are calling to review your visit and offer you a teledermatology visit where you send in photos via [employer]. These photos will be seen by an MD or JOSE CARLOS. This will be billed to you and your insurance.\"  The patient was also told that \"a teledermatology visit is not as thorough as an in-person visit and that the quality of the photograph sent may not be of the same quality as that taken by the dermatology clinic, but the patient would like to proceed with an teledermatology because of National Emergency Regarding Coronavirus disease (COVID 19) Outbreak.\"  \"If a prescription is necessary we can send it directly to your pharmacy.  If lab work is needed we can place an order for that and you can then stop by our lab to have the test done at a later time.\"    The patient understood that they may receive a call from the clinic to review additional history, may still be instructed to come to clinic even after photo review and be billed for both visits with an MD. They were told that a photo assessment does not replace an in person skin exam. The patient understood that teledermatology is not for urgent issues and would require up to 3 business days for review. The patient denied skin pain, fever, mucosal symptoms (lesions, blisters, sores in the mouth, nose, eyes, or genitals). IF PATIENT ENDORSES ANY OF THESE STOP AND PAGE  ON CALL ATTENDING. IF OTHER POSSIBLY URGENT SYMPTOMS THEN PAGE PHYSICIAN YOU ARE SCHEDULING WITH OR ON CALL IF NO ANSWER.     Patient in [place] - a .       The patient chose to:                                                                                                                                                                                                                    Consent to a teledermatology visit with " [employer] photos. The patient understood they must upload a [employer] photo for this visit to be completed. They indicated that the photo will be taken at their home address(if other address please document here). Patient told nursing these are already uploaded .  The patient was instructed to take photos of all all areas of concern and all areas of any rash from near and far away.                                                                                                                                                                                                                               The patient is verified to be in the [facility] at their home address at the time of the encounter.     The physician must be notified by nurse if the patient is not in the state at the time of the encounter. DELETE THIS PHRASE FROM NOTE AFTER COMPLETING IF NEEDED                                                                                                                                                                                                            Patient concerns for this return visit:   Chief Complaint   Patient presents with     Derm Problem     Prurigo nodularis f/u.        Rash comes and goes since last visit. Painful areas. Denies itching. Did not start Cetrizine; too costly. Triamcinolone every night; slight improvement. Makes the areas hurt less. He is still doing bleach baths 3-4 times weekly; curves it from getting worse.           Photo instructions reviewed with patients as below:  -ALL patient needs to send photos unless they have a phone only visit approved by the clinic:  o To send photos to your doctor, respond to the message in [employer] as many times as needed to upload your photos. Each message allows for 3 photos.  o For spots or lesions of concern, please take at least 2 photos of each site you are worried about (at different angles if needed, and at least one close up and one  farther away so we can tell where it is on the body. Be sure all photographs are in focus)  o For rashes, take photos of the entire body because it is important for us to see which areas are involved and which areas are not involved.  At a minimum, please include photos of the arms, legs, front of trunk, back of trunk, face, and a few close-ups of the rash.  Leave undergarments in place unless the rash involves the skin in these areas  o For acne, please take photos of the face, upper chest and neck, and upper chest and back  o For hair loss, please send views of the top of the head, sides of the head, back of the head and a picture of your face with your hair pulled back. Also, a photos of both hands with nails.    -For ADULT NEW patients video visits are needed, to receive an invitation and connect with your provider, the [employer] video visit technology must be accessible from your smartphone or personal device. Please click the link below for setup instructions: [email or web address]    Nursing tasks completed  -Pharmacy preference was updated.  -The nurse has dropped in the AVS information *(For adults the phrase is umdermhteleavs and for pediatrics it is their own) for the physician to route in the AVS.                                                                                                                                                                                                                         -The patient was told to contact the clinic if they have not received correspondence within 72 hours.

## 2020-06-11 NOTE — TELEPHONE ENCOUNTER
Do you have any of the following symptoms:  a)      Fever (or reported chills)NO  b)      Shortness of BreathNO  c)      Rash NO                    dCough in the last 14 days NO      If a patient reports yes to any of these symptoms, obtain direction from the provider and call the patient back to let them know if they can come in or not.  1.    Provider needs to determine if this patient should still be seen in clinic.  2.    If decision to not see in clinic, call patient back and refer them to COVID- 19 [email or web address] or schedule COVID-19 phone visit.  3.    Turn in-person visit into telephone visit (FOR RETURN PATIENTS ONLY)    Remind patients that visitors are not allowed on site. Only one legal guardian who screens negative to the above questions will be allowed to accompany patients. If a patient indicates that they will be bringing a legal guardian with them to the appointment please make sure to screen both the patient and the legal guardian for symptoms using the tool above.

## 2020-06-11 NOTE — PATIENT INSTRUCTIONS
[facility] Teledermatology Visit    Thank you for allowing us to participate in your care. Your findings, instructions and follow-up plan are as follows:  -start the ceterizine once daily, do not take if you will drive, can make you drive  -triamcinolone twice daily for 2 weeks.     When should I call my doctor?    If you are worsening or not improving, please, contact us or seek urgent care as noted below.     Who should I call with questions (adults)?    [facility] (adult and pediatric): [phone]     [facility] (adult): [phone]    For urgent needs outside of business hours call the [facility] at [phone] and ask for the dermatology resident on call    If this is a medical emergency and you are unable to reach an ER, Call [phone]      Who should I call with questions (pediatric)?  [facility]- Pediatric Dermatology  Dr. Sharonda Guerrero, Dr. Sukhdev Servin, Dr. Niya Dawson, Rebekah Harrisnhallie, SAMANTHA Hodges, Dr. Liz Hogan & Dr. Stef Chun  Non Urgent  Nurse Triage Line; [phone]- Susan and Lizzy RN Care Coordinators   [place] (/Complex ) [phone]    If you need a prescription refill, please contact your pharmacy. Refills are approved or denied by our Physicians during normal business hours, Monday through Fridays  Per office policy, refills will not be granted if you have not been seen within the past year (or sooner depending on your child's condition)    Scheduling Information:  Pediatric Appointment Scheduling and Call Center [phone]  Radiology Scheduling- [phone]  Sedation Unit Scheduling- [phone]  [place] Scheduling- General [phone]; Pediatric Dermatology [phone]  Main  Services: [phone]  Portuguese: [phone]  Ethiopian: [phone]  [place]mong/[place] citizen: [phone]  Preadmission Nursing  Department Fax Number: [phone] (Fax all pre-operative paperwork to this number)    For urgent matters arising during evenings, weekends, or holidays that cannot wait for normal business hours please call [phone] and ask for the Dermatology Resident On-Call to be paged.

## 2020-06-15 ENCOUNTER — OFFICE VISIT (OUTPATIENT)
Dept: DERMATOLOGY | Facility: CLINIC | Age: 60
End: 2020-06-15
Payer: COMMERCIAL

## 2020-06-15 VITALS — HEART RATE: 57 BPM | SYSTOLIC BLOOD PRESSURE: 158 MMHG | DIASTOLIC BLOOD PRESSURE: 99 MMHG

## 2020-06-15 DIAGNOSIS — C44.329 SQUAMOUS CELL CANCER OF SKIN OF LEFT CHEEK: Primary | ICD-10-CM

## 2020-06-15 PROCEDURE — 17311 MOHS 1 STAGE H/N/HF/G: CPT | Performed by: DERMATOLOGY

## 2020-06-15 PROCEDURE — 12052 INTMD RPR FACE/MM 2.6-5.0 CM: CPT | Mod: 51 | Performed by: DERMATOLOGY

## 2020-06-15 RX ORDER — MUPIROCIN 20 MG/G
OINTMENT TOPICAL ONCE
Status: COMPLETED | OUTPATIENT
Start: 2020-06-15 | End: 2020-06-15

## 2020-06-15 RX ADMIN — MUPIROCIN: 20 OINTMENT TOPICAL at 09:30

## 2020-06-15 ASSESSMENT — PAIN SCALES - GENERAL: PAINLEVEL: NO PAIN (0)

## 2020-06-15 NOTE — LETTER
6/15/2020          RE: Owen Sahni  [address] [address] [address]        Dear Colleague,    Thank you for referring your patient, Owen Sahni, to the [facility]. Please see a copy of my visit note below.    [facility] Mohs Dermatologic Surgery Procedure Note      Date of Service:  Migue 15, 2020  Surgery: Mohs micrographic surgery    Case 1  Repair Type: intermediate  Repair Size: 4.6 cm  Suture Material: Fast Absorbing Gut 5-0  Tumor Type: SCC - Squamous cell carcinoma  Location: left angle of jaw  Derm-Path Accession #:   PreOp Size: 1.5X1.3 cm  PostOp Size: 2.5X2.5 cm  Mohs Accession #: [id number]  Level of Defect: fat      Procedure:  We discussed the principles of treatment and most likely complications including scarring, bleeding, infection, swelling, pain, crusting, nerve damage, large wound,  incomplete excision, wound dehiscence,  nerve damage, recurrence, and a second procedure may be recommended to obtain the best cosmetic or functional result.    Informed consent was obtained and the patient underwent the procedure as follows:  The patient was placed supine on the operating table.  The cancer was identified, outlined with a marker, and verified by the patient.  The entire surgical field was prepped with Hibiclens.  The surgical site was anesthetized using Lidocaine 1% with epi 1:100,000.      The area of clinically apparent tumor was debulked. The layer of tissue was then surgically excised using a #15 blade and was then transferred onto a specimen sheet maintaining the orientation of the specimen. Hemostasis was obtained using bipolar electrocoagulation. The wound site was then covered with a dressing while the tissue samples were processed for examination.    The excised tissue was transported to the Mohs histology laboratory maintaining the tissue orientation.  The tissue specimen was relaxed so that the entire surgical margin was in a  a single horizontal plane for sectioning and inked for precise mapping.  A precise reference map was drawn to reflect the sectioning of the specimen, colored inking of the margins, and orientation on the patient. The tissue was processed using horizontal sectioning of the base and continuous peripheral margins.  The histopathologic sections were reviewed in conjunction with the reference map.    Total blocks: 1    Total slides:  4    There were no cancer cells visualized on examination, therefore Mohs surgery was complete.    REPAIR: An intermediate layered linear closure was selected as the procedure which would maximally preserve both function and cosmesis.    After the excision of the tumor, the area was carefully undermined. Hemostasis was obtained with electrocoagulation.  Closure was oriented so that the wound was in the patient's natural skin tension lines. The subcutaneous and dermal layers were then closed with 4-0 monocryl sutures. The epidermis was then carefully approximated along the length of the wound using 5-0 Fast Absorbing Gut simple running sutures.     The final wound length was 4.6 cm. A total of 6 ml of anesthesia was administered for all surgical sites. Estimated blood loss was less than 10 ml for all surgical sites. A sterile pressure dressing was applied and wound care instructions, with a written handout, were given. The patient was discharged from the [facility] alert and ambulatory.    Follow-up as needed for wound evaluation.       Anatomic Pathology Results: pending      Staff Involved:  I personally performed the procedures today.    Jass John DO    Department of Dermatology  [facility]: Phone: [phone], Fax:[phone]  [facility]: Phone: [phone], Fax: [phone]    Again, thank you for allowing me to participate in the care of  your patient.        Sincerely,        Jass John MD

## 2020-06-15 NOTE — NURSING NOTE
Owen Sahni's goals for this visit include:   Chief Complaint   Patient presents with     Procedure     MOHS left angle of jaw SCC       He requests these members of his care team be copied on today's visit information:     PCP: Braulio Coronel    Referring Provider:  No referring provider defined for this encounter.    BP (!) 156/106   Pulse 57     Do you need any medication refills at today's visit? No    Amelie Kelly LPN

## 2020-06-15 NOTE — PATIENT INSTRUCTIONS
MOHS Wound Care Instructions  I will experience scar, altered skin color, bleeding, swelling, pain, crusting and redness. I may experience altered sensation. Risks are excessive bleeding, infection, muscle weakness, thick (hypertrophic or keloidal) scar, and recurrence,. A second procedure may be recommended to obtain the best cosmetic or functional result.  Possible complications of any surgical procedure are bleeding, infection, scarring, alteration in skin color and sensation, muscle weakness in the area, wound dehiscence or seperation, or recurrence of the lesion or disease. On occasion, after healing, a secondary procedure or revision may be recommended in order to obtain the best cosmetic or functional result.   After your surgery, a pressure bandage will be placed over the area that has sutures. This will help prevent bleeding. Please follow these instructions as they will help you to prevent complications as your wound heals.  For the First 48 hours After Surgery:  1. Leave the pressure bandage on and keep it dry. If it should come loose, you may retape it, but do not take it off.  2. Relax and take it easy. Do not do any vigorous exercise, heavy lifting, or bending forward. This could cause the wound to bleed.  3. Post-operative pain is usually mild. You may take plain or extra strength Tylenol every 4 hours as needed (do not take more than 4,000mg in one day). Do not take any medicine that contains aspirin, ibuprofen or motrin unless you have been recommended these by a doctor.  Avoid alcohol and vitamin E as these may increase your tendency to bleed.  4. You may put an ice pack around the bandaged area for 20 minutes every 2-3 hours. This may help reduce swelling, bruising, and pain. Make sure the ice pack is waterproof so that the pressure bandage does not get wet.   5. You may see a small amount of drainage or blood on your pressure bandage. This is normal. However, if drainage or bleeding continues or  saturates the bandage, you will need to apply firm pressure over the bandage with a washcloth for 15 minutes. If bleeding continues after applying pressure for 15 minutes then go to the nearest emergency room.  48 Hours After Surgery  Carefully remove the bandage and start daily wound care and dressing changes. You may also now shower and get the wound wet. Wash wound with a mild soap and water.  Use caution when washing the wound. Be gentle and do not let the forceful shower stream hit the wound directly.  PAT dry.  Daily Wound Care:  1. Wash wound with a mild soap and water.  Use caution when washing the wound, be gentle and do not let the forceful shower stream hit the wound directly.  2. PAT DRY.  3. Apply Vaseline (from a new container or tube) over the suture line with a Q-tip. It is very important to keep the wound continuously moist, as wounds heal best in a moist environment.  4.  Keep the site covered until sutures are removed, you can cover it with a Telfa (non-stick) dressing and tape or a band-aid.    5. If you are unable to keep wound covered, you must apply Vaseline every 2 - 3 hours (while awake) to ensure it is being kept moist for optimal healing. A dressing overnight is recommended to keep the area moist.   Call Us If:  1. You have pain that is not controlled with Tylenol.  2. You have signs or symptoms of an infection, such as: fever over 100 degrees F, redness, warmth, or foul-smelling or yellow/creamy drainage from the wound.  Who should I call with questions?    [facility]: [phone]     [facility]: [phone]    For urgent needs outside of business hours call the [facility] at [phone] and ask for the dermatology resident on call

## 2020-06-15 NOTE — NURSING NOTE
Mupirocin, Vaseline and pressure dressing applied to Mohs site on left angle of jawline.  Wound care instructions reviewed with patient and AVS provided.  Patient verbalized understanding. No further questions or concerns at this time.      The following medication was given:     MEDICATION:  Lidocaine with epinephrine 1% 1:[id number]  ROUTE: SQ  SITE: see procedure note  DOSE: 6cc  LOT #: -DK  : Hospira  EXPIRATION DATE: 1-nov-2020  NDC#: 1661-7995-13   Was there drug waste? none  Multi-dose vial: Yes    Amelie Kelly LPN  Allyssa 15, 2020

## 2020-06-16 ENCOUNTER — VIRTUAL VISIT (OUTPATIENT)
Dept: DERMATOLOGY | Facility: CLINIC | Age: 60
End: 2020-06-16
Payer: COMMERCIAL

## 2020-06-16 DIAGNOSIS — R21 RASH AND OTHER NONSPECIFIC SKIN ERUPTION: ICD-10-CM

## 2020-06-16 DIAGNOSIS — L28.1 PRURIGO NODULARIS: ICD-10-CM

## 2020-06-16 PROCEDURE — 99213 OFFICE O/P EST LOW 20 MIN: CPT | Mod: 95 | Performed by: DERMATOLOGY

## 2020-06-16 RX ORDER — TRIAMCINOLONE ACETONIDE 1 MG/G
CREAM TOPICAL 2 TIMES DAILY
Qty: 454 G | Refills: 0 | Status: SHIPPED | OUTPATIENT
Start: 2020-06-16

## 2020-06-16 RX ORDER — CETIRIZINE HYDROCHLORIDE 10 MG/1
10 TABLET ORAL DAILY
Qty: 60 TABLET | Refills: 0 | Status: SHIPPED | OUTPATIENT
Start: 2020-06-16 | End: 2020-10-12

## 2020-06-16 NOTE — LETTER
6/16/2020         RE: Owen Sahni  [address] [address] [address]        Dear Colleague,    Thank you for referring your patient, Owen Sahni, to the [facility]. Please see a copy of my visit note below.    [facility]  Store and Forward and Telephone [phone]      Impression and Recommendations (Patient Counseled on the Following):     1. Superficially invasive squamous cell carcinoma, left angle of the jaw   - s/p mohs  2. Prurigo nodularis with significant element of excoriating, s/p biopsies 1/27/20 and pruritus work up pending. Reviewed nature with patient. Now negative hep C.   -continue bleach baths 3 times weekly  -Start ceterizine once daily, this can make you drowsy, do not do when driving  - Continue triamcinolone 0.1% cream to affected areas.   - SPEP slightly elevated at 1.7, will send for PCP follow up but does not appear to be relevant  -recommend Keeping nails short, wearing gloves at night, and wearing bandages over the lesions that itch the most can limit the damage from scratching.   -follow up in 2 weeks, pt may cancel if doing well and extend to 4 week follow up and treated his abdomen and legs      Follow-up:   Follow-up with dermatology in approximately 2 weeks photos and phone. Earlier for new or changing lesions or rash.      Staff only:    Amy Oakley MD    Department of Dermatology  [facility]: Phone: [phone], Fax:[phone]  [facility]: Phone: [phone], Fax: [phone]        _____________________________________________________________________________       Dermatology Problem List:  1. 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   -s/p  shave biopsy on the right retro auricular neck  and right base of the thumb   -Fungal scraping negative for mites on 1/27/20   -Current t/x: Bleach baths  -s/p permethrin cream without relief.   -work up with hepatitis, cbc with diff, tsh with reflex T4 and ferritin negative  2. SCC, left angle of the jaw, s/p biopsy 1/27/20 now s/p MOhs    Encounter Date: Jun 16, 2020    CC:   Chief Complaint   Patient presents with     Derm Problem     Prurigo nodularis f/u.        History of Present Illness:  I have reviewed the teledermatology information and the nursing intake corresponding to this issue. Owen Sahni is a 59 year old male who presents via teledermatology for prurigo. At last visit start topical steroids and ceterizine. HE never started certizine. He didn't pick it up. He will go and pick it up.       ROS: Patient is generally feeling well today  But frusterated    Physical Examination:  General: Well-appearing  appropriately-developed individual.  Skin: Focused examination within the teledermatology photograph(s) including trunk and extremities was performed.   -erythematous and some excoriated macules on the occipital scale, extremity and abdomen    Labs:  Component      Latest Ref Rng & Units 6/8/2020   WBC      4.0 - 11.0 10e9/L 8.6   RBC Count      4.4 - 5.9 10e12/L 4.83   Hemoglobin      13.3 - 17.7 g/dL 14.3   Hematocrit      40.0 - 53.0 % 44.4   MCV      78 - 100 fl 92   MCH      26.5 - 33.0 pg 29.6   MCHC      31.5 - 36.5 g/dL 32.2   RDW      10.0 - 15.0 % 13.7   Platelet Count      150 - 450 10e9/L 268   % Neutrophils      % 70.5   % Lymphocytes      % 17.1   % Monocytes      % 9.0   % Eosinophils      % 3.2   % Basophils      % 0.2   Absolute Neutrophil      1.6 - 8.3 10e9/L 6.1   Absolute Lymphocytes      0.8 - 5.3 10e9/L 1.5   Absolute Monocytes      0.0 - 1.3 10e9/L 0.8   Absolute Eosinophils      0.0 - 0.7 10e9/L 0.3   Absolute Basophils      0.0 - 0.2 10e9/L 0.0   Diff Method       Automated  Method   Albumin Fraction      3.7 - 5.1 g/dL PENDING   Alpha 1 Fraction      0.2 - 0.4 g/dL PENDING   Alpha 2 Fraction      0.5 - 0.9 g/dL PENDING   Beta Fraction      0.6 - 1.0 g/dL PENDING   Gamma Fraction      0.7 - 1.6 g/dL PENDING   Monoclonal Peak      0.0 g/dL PENDING   ELP Interpretation:       PENDING   Bilirubin Direct      0.0 - 0.2 mg/dL 0.1   Bilirubin Total      0.2 - 1.3 mg/dL 0.3   Albumin      3.4 - 5.0 g/dL 3.4   Protein Total      6.8 - 8.8 g/dL 8.1   Alkaline Phosphatase      40 - 150 U/L 74   ALT      0 - 70 U/L 44   AST      0 - 45 U/L 30   Creatinine      0.66 - 1.25 mg/dL 1.00   GFR Estimate      >60 mL/min/1.73:m2 81   GFR Estimate If Black      >60 mL/min/1.73:m2 >90   Ferritin      26 - 388 ng/mL 440 (H)   TSH      0.40 - 4.00 mU/L 1.48   Urea Nitrogen      7 - 30 mg/dL 20   INR      0.86 - 1.14 2.40 (H)       Past Medical History:   Patient Active Problem List   Diagnosis     Sleep apnea     Lumbosacral radiculopathy     CARDIOVASCULAR SCREENING; LDL GOAL LESS THAN 130     Abnormal EKG     SOB (shortness of breath)     Seafood allergy     AK (actinic keratosis)     Myalgia     Advanced directives, counseling/discussion     Essential hypertension with goal blood pressure less than 140/90     Benign essential hypertension     Hypertrophic obstructive cardiomyopathy (H)     Systolic murmur     Atrial fibrillation (H)     Long term (current) use of anticoagulants     Obesity (BMI 35.0-39.9) with comorbidity (H)     Past Medical History:   Diagnosis Date     Hypertension      Hypertrophic cardiomyopathy (H)      Past Surgical History:   Procedure Laterality Date     COLONOSCOPY  4/12/2012    Procedure:COLONOSCOPY; Colonoscopy, screening; Surgeon:ARANZA TEE; Location: OR       Social History:  Patient reports that he has never smoked. He has never used smokeless tobacco. He reports previous alcohol use. He reports that he does not use drugs.    Family History:  Family History   Problem  Relation Age of Onset     Cancer Mother         breast     Cancer Father         Multiple myeloma     Hypertension Father      Arrhythmia No family hx of      Myocardial Infarction No family hx of      Diabetes No family hx of      Coronary Artery Disease No family hx of      Cerebrovascular Disease No family hx of        Medications:  Current Outpatient Medications   Medication     cetirizine (ZYRTEC) 10 MG tablet     clobetasol (TEMOVATE) 0.05 % external ointment     losartan (COZAAR) 50 MG tablet     metoprolol succinate ER (TOPROL-XL) 50 MG 24 hr tablet     multivitamin, therapeutic with minerals (MULTI-VITAMIN) TABS     pramoxine (PRAX) 1 % LOTN lotion     triamcinolone (KENALOG) 0.1 % external cream     warfarin ANTICOAGULANT (COUMADIN) 5 MG tablet     No current facility-administered medications for this visit.           Allergies   Allergen Reactions     Shellfish-Derived Products Anaphylaxis         _____________________________________________________________________________    Teledermatology information:  - Location of patient in [place]: Home  - Patient presented as: return  - Location of teledermatologist:  ([facility] )  - Reason teledermatology is appropriate:  of National Emergency Regarding Coronavirus disease (COVID 19) Outbreak  - Image quality and interpretability: acceptable  - Physician has received verbal consent for a Video/Photos Visit from the patient? Yes  - In-person dermatology visit recommendation: Yes fo rhis skin cancer  - Date of images: 6/16/2020  - Service start time:2:06pm  - Service end time: 2:16pm  - Date of report: 6/9/2020                   Again, thank you for allowing me to participate in the care of your patient.        Sincerely,        Amy Oakley MD

## 2020-06-29 ENCOUNTER — VIRTUAL VISIT (OUTPATIENT)
Dept: DERMATOLOGY | Facility: CLINIC | Age: 60
End: 2020-06-29

## 2020-06-29 DIAGNOSIS — I42.1 HYPERTROPHIC OBSTRUCTIVE CARDIOMYOPATHY (H): ICD-10-CM

## 2020-06-29 DIAGNOSIS — I10 ESSENTIAL HYPERTENSION WITH GOAL BLOOD PRESSURE LESS THAN 140/90: ICD-10-CM

## 2020-06-29 DIAGNOSIS — C44.329 SQUAMOUS CELL CANCER OF SKIN OF JAWLINE: Primary | ICD-10-CM

## 2020-06-29 PROCEDURE — 99024 POSTOP FOLLOW-UP VISIT: CPT | Mod: 95 | Performed by: DERMATOLOGY

## 2020-06-29 NOTE — PATIENT INSTRUCTIONS
[facility] Teledermatology Visit    Thank you for allowing us to participate in your care. Your findings, instructions and follow-up plan are as follows:    Continue wound care on your left jaw.   Continue sun avoidance and sun protection.        When should I call my doctor?    If you are worsening or not improving, please, contact us or seek urgent care as noted below.     Who should I call with questions (adults)?    [facility] (adult and pediatric): [phone]     [facility] (adult): [phone]    For urgent needs outside of business hours call the [facility] at [phone] and ask for the dermatology resident on call    If this is a medical emergency and you are unable to reach an ER, Call [phone]      Who should I call with questions (pediatric)?  [facility]- Pediatric Dermatology  Dr. Sharonda Guerrero, Dr. Sukhdev Servin, Dr. Niya Dawson, Rebekah Petersen, PA  Dr. Tori Hodges, Dr. Liz Hogan & Dr. Stef Chun  Non Urgent  Nurse Triage Line; [phone]- Susan and Lizzy HOPKINS Care Coordinators   [place] (/Complex ) [phone]    If you need a prescription refill, please contact your pharmacy. Refills are approved or denied by our Physicians during normal business hours, Monday through Fridays  Per office policy, refills will not be granted if you have not been seen within the past year (or sooner depending on your child's condition)    Scheduling Information:  Pediatric Appointment Scheduling and Call Center [phone]  Radiology Scheduling- [phone]  Sedation Unit Scheduling- [phone]  [place] Scheduling- General [phone]; Pediatric Dermatology [phone]  Main  Services: [phone]  Colombian: [phone]  Czech: [phone]  Hmong/Maltese/Malian: [phone]  Preadmission Nursing Department Fax Number: [phone] (Fax all  pre-operative paperwork to this number)    For urgent matters arising during evenings, weekends, or holidays that cannot wait for normal business hours please call [phone] and ask for the Dermatology Resident On-Call to be paged.

## 2020-06-29 NOTE — LETTER
July 8, 2020    Owen Sahni  [address] [address] [address]    Dear Owen,       We recently received a refill request for metoprolol succinate ER (TOPROL-XL).  We have refilled this for a one time  day supply only because you are due for a:    Blood pressure office visit for further refills.        Please call at your earliest convenience so that there will not be a delay with your future refills.          Thank you,   [facility]  [phone]

## 2020-06-29 NOTE — PROGRESS NOTES
[facility]  Store and Forward and Telephone [phone]      Impression and Recommendations (Patient Counseled on the Following):  1. SCC, left angle of jaw  2 weeks s/p Mohs and linear repair   No pictures available.   From description, it is healing well.  Follow up in Dec 2020 for skin cancer screening.        Staff only:    Jass John DO    Department of Dermatology  [facility]: Phone: [phone], Fax:[phone]  [facility]: Phone: [phone], Fax: [phone]    _____________________________________________________________________________    Dermatology Problem List:  1. SCC, left angle of the jaw, s/p Mohs 6/15/20    2. 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   -s/p shave biopsy on the right retro auricular neck  and right base of the thumb   -Fungal scraping negative for mites on 1/27/20   -Current t/x: Bleach baths  -s/p permethrin cream without relief.     Encounter Date: Jun 29, 2020    CC:   Chief Complaint   Patient presents with     post op     2 weeks s/p Mohs.  Bumps on both ends of scar.       History of Present Illness:  I have reviewed the teledermatology information and the nursing intake corresponding to this issue. Owen Sahni is a 60 year old male who presents via teledermatology for Mohs follow up.    Still a puckered area of skin at the top of the linear repair.   Sutures still intact.   No pain bleeding or weeping.       ROS: Patient is generally feeling well today     Physical Examination:  General: Well-appearing, appropriately-developed individual.  Skin: No photos submitted.     Labs:  N/A    Past Medical History:   Patient Active Problem List   Diagnosis     Sleep apnea      Lumbosacral radiculopathy     CARDIOVASCULAR SCREENING; LDL GOAL LESS THAN 130     Abnormal EKG     SOB (shortness of breath)     Seafood allergy     AK (actinic keratosis)     Myalgia     Advanced directives, counseling/discussion     Essential hypertension with goal blood pressure less than 140/90     Benign essential hypertension     Hypertrophic obstructive cardiomyopathy (H)     Systolic murmur     Atrial fibrillation (H)     Long term (current) use of anticoagulants     Obesity (BMI 35.0-39.9) with comorbidity (H)     Past Medical History:   Diagnosis Date     Hypertension      Hypertrophic cardiomyopathy (H)      Past Surgical History:   Procedure Laterality Date     COLONOSCOPY  4/12/2012    Procedure:COLONOSCOPY; Colonoscopy, screening; Surgeon:ARANZA TEE; Location: OR       Social History:  Patient reports that he has never smoked. He has never used smokeless tobacco. He reports previous alcohol use. He reports that he does not use drugs.    Family History:  Family History   Problem Relation Age of Onset     Cancer Mother         breast     Cancer Father         Multiple myeloma     Hypertension Father      Arrhythmia No family hx of      Myocardial Infarction No family hx of      Diabetes No family hx of      Coronary Artery Disease No family hx of      Cerebrovascular Disease No family hx of        Medications:  Current Outpatient Medications   Medication     cetirizine (ZYRTEC) 10 MG tablet     clobetasol (TEMOVATE) 0.05 % external ointment     losartan (COZAAR) 50 MG tablet     metoprolol succinate ER (TOPROL-XL) 50 MG 24 hr tablet     multivitamin, therapeutic with minerals (MULTI-VITAMIN) TABS     pramoxine (PRAX) 1 % LOTN lotion     triamcinolone (KENALOG) 0.1 % external cream     warfarin ANTICOAGULANT (COUMADIN) 5 MG tablet     No current facility-administered medications for this visit.           Allergies   Allergen Reactions     Shellfish-Derived Products Anaphylaxis  "        _____________________________________________________________________________    Teledermatology information:  - Location of patient: [place]  - Patient presented as: return  - Location of teledermatologist:  ([facility] )  - Reason teledermatology is appropriate:  of National Emergency Regarding Coronavirus disease (COVID 19) Outbreak  - Image quality and interpretability: None  - Physician has received verbal consent for a Video/Photos Visit from the patient? Yes  - In-person dermatology visit recommendation: no  - Date of images: 6/29/20  - Service start time:1118  - Service end time:1122  - Date of report: 6/29/2020       Teledermatology Nurse Call for RETURN patients seen within the last 3 years:    The patient was contacted by phone and we reviewed, \"Due to the coronavirus pandemic, we are calling to review your visit and offer you a teledermatology visit where you send in photos via [employer]. These photos will be seen by an MD or JOSE CARLOS. This will be billed to you and your insurance.\"  The patient was also told that \"a teledermatology visit is not as thorough as an in-person visit and that the quality of the photograph sent may not be of the same quality as that taken by the dermatology clinic, but the patient would like to proceed with an teledermatology because of National Emergency Regarding Coronavirus disease (COVID 19) Outbreak.\"  \"If a prescription is necessary we can send it directly to your pharmacy.  If lab work is needed we can place an order for that and you can then stop by our lab to have the test done at a later time.\"    The patient understood that they may receive a call from the clinic to review additional history, may still be instructed to come to clinic even after photo review and be billed for both visits with an MD. Visits are billed at different rates depending on your insurance coverage. Please reach out to your insurance provider with any questions.They were " told that a photo assessment does not replace an in person skin exam. The patient understood that teledermatology is not for urgent issues and would require up to 3 business days for review. The patient denied skin pain, fever, mucosal symptoms (lesions, blisters, sores in the mouth, nose, eyes, or genitals).      The patient chose to:                                                                                                                                                                                                                    Consent to a teledermatology visit with mychart photos. The patient understood they must upload a mychart photo for this visit to be completed. They indicated that the photo will be taken at their home address(if other address please document here). Patient told nursing these will be uploaded prior to visit.  The patient was instructed to take photos of all all areas of concern and all areas of any rash from near and far away.    The patient is verified to be in the [facility] at the time of the encounter.                                                            Patient concerns for this return visit:   Chief Complaint   Patient presents with     post op     2 weeks s/p Mohs.  Bumps on both ends of scar.       Nursing tasks completed  -Pharmacy preference was updated.  -The nurse has dropped in the AVS information *(For adults the phrase is umdermhteleavs and for pediatrics it is their own) for the physician to route in the AVS.    Sarah Noyola RN

## 2020-06-29 NOTE — LETTER
6/29/2020         RE: Owen Sahni  [address] [address]        Dear Colleague,    Thank you for referring your patient, Owen Sahni, to the [facility]. Please see a copy of my visit note below.    [facility]  Store and Forward and Telephone [phone]      Impression and Recommendations (Patient Counseled on the Following):  1. SCC, left angle of jaw  2 weeks s/p Mohs and linear repair   No pictures available.   From description, it is healing well.  Follow up in Dec 2020 for skin cancer screening.        Staff only:    Jass John DO    Department of Dermatology  [facility]: Phone: [phone], Fax:[phone]  [facility]: Phone: [phone], Fax: [phone]    _____________________________________________________________________________    Dermatology Problem List:  1. SCC, left angle of the jaw, s/p Mohs 6/15/20    2. 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   -s/p shave biopsy on the right retro auricular neck  and right base of the thumb   -Fungal scraping negative for mites on 1/27/20   -Current t/x: Bleach baths  -s/p permethrin cream without relief.     Encounter Date: Jun 29, 2020    CC:   Chief Complaint   Patient presents with     post op     2 weeks s/p Mohs.  Bumps on both ends of scar.       History of Present Illness:  I have reviewed the teledermatology information and the nursing intake corresponding to this issue. Owen Sahni is a 60 year old male who presents via teledermatology for Mohs follow up.    Still a puckered area of skin at the top of the linear repair.   Sutures still intact.   No pain bleeding or weeping.       ROS: Patient is  generally feeling well today     Physical Examination:  General: Well-appearing, appropriately-developed individual.  Skin: No photos submitted.     Labs:  N/A    Past Medical History:   Patient Active Problem List   Diagnosis     Sleep apnea     Lumbosacral radiculopathy     CARDIOVASCULAR SCREENING; LDL GOAL LESS THAN 130     Abnormal EKG     SOB (shortness of breath)     Seafood allergy     AK (actinic keratosis)     Myalgia     Advanced directives, counseling/discussion     Essential hypertension with goal blood pressure less than 140/90     Benign essential hypertension     Hypertrophic obstructive cardiomyopathy (H)     Systolic murmur     Atrial fibrillation (H)     Long term (current) use of anticoagulants     Obesity (BMI 35.0-39.9) with comorbidity (H)     Past Medical History:   Diagnosis Date     Hypertension      Hypertrophic cardiomyopathy (H)      Past Surgical History:   Procedure Laterality Date     COLONOSCOPY  4/12/2012    Procedure:COLONOSCOPY; Colonoscopy, screening; Surgeon:ARANZA TEE; Location: OR       Social History:  Patient reports that he has never smoked. He has never used smokeless tobacco. He reports previous alcohol use. He reports that he does not use drugs.    Family History:  Family History   Problem Relation Age of Onset     Cancer Mother         breast     Cancer Father         Multiple myeloma     Hypertension Father      Arrhythmia No family hx of      Myocardial Infarction No family hx of      Diabetes No family hx of      Coronary Artery Disease No family hx of      Cerebrovascular Disease No family hx of        Medications:  Current Outpatient Medications   Medication     cetirizine (ZYRTEC) 10 MG tablet     clobetasol (TEMOVATE) 0.05 % external ointment     losartan (COZAAR) 50 MG tablet     metoprolol succinate ER (TOPROL-XL) 50 MG 24 hr tablet     multivitamin, therapeutic with minerals (MULTI-VITAMIN) TABS     pramoxine (PRAX) 1 % LOTN lotion     triamcinolone  "(KENALOG) 0.1 % external cream     warfarin ANTICOAGULANT (COUMADIN) 5 MG tablet     No current facility-administered medications for this visit.           Allergies   Allergen Reactions     Shellfish-Derived Products Anaphylaxis         _____________________________________________________________________________    Teledermatology information:  - Location of patient: [place]  - Patient presented as: return  - Location of teledermatologist:  ([facility] )  - Reason teledermatology is appropriate:  of National Emergency Regarding Coronavirus disease (COVID 19) Outbreak  - Image quality and interpretability: None  - Physician has received verbal consent for a Video/Photos Visit from the patient? Yes  - In-person dermatology visit recommendation: no  - Date of images: 6/29/20  - Service start time:1118  - Service end time:1122  - Date of report: 6/29/2020       Teledermatology Nurse Call for RETURN patients seen within the last 3 years:    The patient was contacted by phone and we reviewed, \"Due to the coronavirus pandemic, we are calling to review your visit and offer you a teledermatology visit where you send in photos via [employer]. These photos will be seen by an MD or JOSE CARLOS. This will be billed to you and your insurance.\"  The patient was also told that \"a teledermatology visit is not as thorough as an in-person visit and that the quality of the photograph sent may not be of the same quality as that taken by the dermatology clinic, but the patient would like to proceed with an teledermatology because of National Emergency Regarding Coronavirus disease (COVID 19) Outbreak.\"  \"If a prescription is necessary we can send it directly to your pharmacy.  If lab work is needed we can place an order for that and you can then stop by our lab to have the test done at a later time.\"    The patient understood that they may receive a call from the clinic to review additional history, may still be instructed to " come to clinic even after photo review and be billed for both visits with an MD. Visits are billed at different rates depending on your insurance coverage. Please reach out to your insurance provider with any questions.They were told that a photo assessment does not replace an in person skin exam. The patient understood that teledermatology is not for urgent issues and would require up to 3 business days for review. The patient denied skin pain, fever, mucosal symptoms (lesions, blisters, sores in the mouth, nose, eyes, or genitals).      The patient chose to:                                                                                                                                                                                                                    Consent to a teledermatology visit with mychart photos. The patient understood they must upload a mychart photo for this visit to be completed. They indicated that the photo will be taken at their home address(if other address please document here). Patient told nursing these will be uploaded prior to visit.  The patient was instructed to take photos of all all areas of concern and all areas of any rash from near and far away.    The patient is verified to be in the [facility] at the time of the encounter.                                                            Patient concerns for this return visit:   Chief Complaint   Patient presents with     post op     2 weeks s/p Mohs.  Bumps on both ends of scar.       Nursing tasks completed  -Pharmacy preference was updated.  -The nurse has dropped in the AVS information *(For adults the phrase is umdermhteleavs and for pediatrics it is their own) for the physician to route in the AVS.    Sarah Noyola RN      Again, thank you for allowing me to participate in the care of your patient.        Sincerely,        Jass John MD

## 2020-07-06 DIAGNOSIS — I42.1 HYPERTROPHIC OBSTRUCTIVE CARDIOMYOPATHY (H): ICD-10-CM

## 2020-07-06 DIAGNOSIS — I10 ESSENTIAL HYPERTENSION WITH GOAL BLOOD PRESSURE LESS THAN 140/90: ICD-10-CM

## 2020-07-06 RX ORDER — METOPROLOL SUCCINATE 50 MG/1
50 TABLET, EXTENDED RELEASE ORAL DAILY
Qty: 90 TABLET | Refills: 3 | Status: SHIPPED | OUTPATIENT
Start: 2020-07-06 | End: 2021-06-03

## 2020-07-06 NOTE — TELEPHONE ENCOUNTER
Pending Prescriptions:                       Disp   Refills    metoprolol succinate ER (TOPROL-XL) 50 MG*90 tab*3            Sig: Take 1 tablet (50 mg) by mouth daily    Last Visit 7/10/2019 w/Can  Last Filled 3/7/2020  Quantity 90  Req. Received 7/6/2020  TI Worrell

## 2020-07-08 RX ORDER — METOPROLOL SUCCINATE 50 MG/1
TABLET, EXTENDED RELEASE ORAL
Qty: 90 TABLET | Refills: 0 | Status: SHIPPED | OUTPATIENT
Start: 2020-07-08 | End: 2021-01-07

## 2020-07-09 NOTE — PROGRESS NOTES
[facility] Dermatologic Surgery Procedure Note      Date of Service:  Migue 15, 2020  Surgery: Mohs micrographic surgery    Case 1  Repair Type: intermediate  Repair Size: 4.6 cm  Suture Material: Fast Absorbing Gut 5-0  Tumor Type: SCC - Squamous cell carcinoma  Location: left angle of jaw  Derm-Path Accession #:   PreOp Size: 1.5X1.3 cm  PostOp Size: 2.5X2.5 cm  Mohs Accession #: [id number]  Level of Defect: fat      Procedure:  We discussed the principles of treatment and most likely complications including scarring, bleeding, infection, swelling, pain, crusting, nerve damage, large wound,  incomplete excision, wound dehiscence,  nerve damage, recurrence, and a second procedure may be recommended to obtain the best cosmetic or functional result.    Informed consent was obtained and the patient underwent the procedure as follows:  The patient was placed supine on the operating table.  The cancer was identified, outlined with a marker, and verified by the patient.  The entire surgical field was prepped with Hibiclens.  The surgical site was anesthetized using Lidocaine 1% with epi 1:100,000.      The area of clinically apparent tumor was debulked. The layer of tissue was then surgically excised using a #15 blade and was then transferred onto a specimen sheet maintaining the orientation of the specimen. Hemostasis was obtained using bipolar electrocoagulation. The wound site was then covered with a dressing while the tissue samples were processed for examination.    The excised tissue was transported to the Mohs histology laboratory maintaining the tissue orientation.  The tissue specimen was relaxed so that the entire surgical margin was in a a single horizontal plane for sectioning and inked for precise mapping.  A precise reference map was drawn to reflect the sectioning of the specimen, colored inking of the margins, and orientation on the patient. The tissue was processed using  horizontal sectioning of the base and continuous peripheral margins.  The histopathologic sections were reviewed in conjunction with the reference map.    Total blocks: 1    Total slides:  4    There were no cancer cells visualized on examination, therefore Mohs surgery was complete.    REPAIR: An intermediate layered linear closure was selected as the procedure which would maximally preserve both function and cosmesis.    After the excision of the tumor, the area was carefully undermined. Hemostasis was obtained with electrocoagulation.  Closure was oriented so that the wound was in the patient's natural skin tension lines. The subcutaneous and dermal layers were then closed with 4-0 monocryl sutures. The epidermis was then carefully approximated along the length of the wound using 5-0 Fast Absorbing Gut simple running sutures.     The final wound length was 4.6 cm. A total of 6 ml of anesthesia was administered for all surgical sites. Estimated blood loss was less than 10 ml for all surgical sites. A sterile pressure dressing was applied and wound care instructions, with a written handout, were given. The patient was [facility] from the [facility] alert and ambulatory.    Follow-up as needed for wound evaluation.       Anatomic Pathology Results: pending      Staff Involved:  I personally performed the procedures today.    Jass John DO    Department of Dermatology  [facility]: Phone: [phone], Fax:[phone]  [facility]: Phone: [phone], Fax: [phone]

## 2020-07-15 ENCOUNTER — ANTICOAGULATION THERAPY VISIT (OUTPATIENT)
Dept: NURSING | Facility: CLINIC | Age: 60
End: 2020-07-15

## 2020-07-15 DIAGNOSIS — Z79.01 LONG TERM (CURRENT) USE OF ANTICOAGULANTS: ICD-10-CM

## 2020-07-15 DIAGNOSIS — I48.91 ATRIAL FIBRILLATION, UNSPECIFIED TYPE (H): ICD-10-CM

## 2020-07-15 LAB
CAPILLARY BLOOD COLLECTION: NORMAL
INR PPP: 1.9 (ref 0.86–1.14)

## 2020-07-15 PROCEDURE — 36416 COLLJ CAPILLARY BLOOD SPEC: CPT | Performed by: FAMILY MEDICINE

## 2020-07-15 PROCEDURE — 99207 ZZC NO CHARGE NURSE ONLY: CPT

## 2020-07-15 PROCEDURE — 85610 PROTHROMBIN TIME: CPT | Performed by: FAMILY MEDICINE

## 2020-07-15 NOTE — PROGRESS NOTES
ANTICOAGULATION FOLLOW-UP CLINIC VISIT    Patient Name:  Owen Sahni  Date:  7/15/2020  Contact Type:  Telephone    SUBJECTIVE:  Patient Findings     Positives:   Change in diet/appetite    Comments:   Sub at 1.9. denies missed dose. States ate large salad a couple days ago which is unusual for him. No concerns. Continue same.         Clinical Outcomes     Comments:   Sub at 1.9. denies missed dose. States ate large salad a couple days ago which is unusual for him. No concerns. Continue same.            OBJECTIVE    Recent labs: (last 7 days)     07/15/20  1020   INR 1.90*       ASSESSMENT / PLAN  INR assessment SUB    Recheck INR In: 4 WEEKS    INR Location Clinic      Anticoagulation Summary  As of 7/15/2020    INR goal:   2.0-3.0   TTR:   52.7 % (11.9 mo)   INR used for dosin.90! (7/15/2020)   Warfarin maintenance plan:   5 mg (5 mg x 1) every Mon, Thu; 7.5 mg (5 mg x 1.5) all other days   Full warfarin instructions:   5 mg every Mon, Thu; 7.5 mg all other days   Weekly warfarin total:   47.5 mg   No change documented:   Sushila Velazquez RN   Plan last modified:   Sushila Velazquez RN (2020)   Next INR check:   2020   Priority:   Maintenance   Target end date:       Indications    Atrial fibrillation (H) [I48.91]  Long term (current) use of anticoagulants [Z79.01]             Anticoagulation Episode Summary     INR check location:       Preferred lab:       Send INR reminders to:   VIDA EPPERSON    Comments:         Anticoagulation Care Providers     Provider Role Specialty Phone number    Evelio Oliver MD Referring Cardiology [phone]            See the Encounter Report to view Anticoagulation Flowsheet and Dosing Calendar (Go to Encounters tab in chart review, and find the Anticoagulation Therapy Visit)    Dosage adjustment made based on physician directed care plan.    Sushila Velazquez RN

## 2020-08-30 NOTE — NURSING NOTE
Owen Sahni's goals for this visit include:   Chief Complaint   Patient presents with     Rash     using triamcinolone and taking benadryl        He requests these members of his care team be copied on today's visit information:     PCP: Braulio Coronel    Referring Provider:  No referring provider defined for this encounter.    There were no vitals taken for this visit.    Do you need any medication refills at today's visit? Noris Kelly LPN      
None

## 2020-08-31 ENCOUNTER — DOCUMENTATION ONLY (OUTPATIENT)
Dept: LAB | Facility: CLINIC | Age: 60
End: 2020-08-31

## 2020-09-10 ENCOUNTER — TELEPHONE (OUTPATIENT)
Dept: FAMILY MEDICINE | Facility: CLINIC | Age: 60
End: 2020-09-10

## 2020-09-10 NOTE — TELEPHONE ENCOUNTER
No show for INR apt on. Called patient and he forgot. Rescheduled for 9/16, next available for his work schedule. Sushila Velazquez RN

## 2020-09-30 DIAGNOSIS — I10 ESSENTIAL HYPERTENSION WITH GOAL BLOOD PRESSURE LESS THAN 140/90: ICD-10-CM

## 2020-09-30 RX ORDER — LOSARTAN POTASSIUM 50 MG/1
TABLET ORAL
Qty: 90 TABLET | Refills: 0 | Status: SHIPPED | OUTPATIENT
Start: 2020-09-30 | End: 2021-01-07

## 2020-09-30 NOTE — LETTER
September 30, 2020    Owen Sahni  [address] [address]    Dear Owen,       We recently received a refill request for losartan (COZAAR) 50 MG tablet .  We have refilled this for a one time 90 day supply only because you are due for a:    Physical office visit and fasting lab appointment      Please schedule this lab appointment 4-5 days prior to the office visit.     Please call at your earliest convenience so that there will not be a delay with your future refills.          Thank you,   Your [facility] Team/  [phone]

## 2020-09-30 NOTE — TELEPHONE ENCOUNTER
TC, patient due for:  Physical    Health Maintenance Due   Topic Date Due     HIV SCREENING  06/27/1975     PREVENTIVE CARE VISIT  11/10/2017     LIPID  06/03/2020     INFLUENZA VACCINE (1) 09/01/2020     ADVANCE CARE PLANNING  10/06/2020     Chanel BUTLERN, RN

## 2020-10-07 DIAGNOSIS — L28.1 PRURIGO NODULARIS: ICD-10-CM

## 2020-10-12 RX ORDER — CETIRIZINE HYDROCHLORIDE 10 MG/1
TABLET ORAL
Qty: 60 TABLET | Refills: 3 | Status: SHIPPED | OUTPATIENT
Start: 2020-10-12 | End: 2021-03-11

## 2020-10-13 ENCOUNTER — TELEPHONE (OUTPATIENT)
Dept: FAMILY MEDICINE | Facility: CLINIC | Age: 60
End: 2020-10-13

## 2020-10-13 NOTE — TELEPHONE ENCOUNTER
Owen Sahni is overdue for INR check.      Left message for patient to call and schedule INR check as soon as possible. If returning call, please schedule. . Sushila Velazquez RN

## 2020-10-14 ENCOUNTER — ANTICOAGULATION THERAPY VISIT (OUTPATIENT)
Dept: NURSING | Facility: CLINIC | Age: 60
End: 2020-10-14

## 2020-10-14 DIAGNOSIS — Z79.01 LONG TERM (CURRENT) USE OF ANTICOAGULANTS: ICD-10-CM

## 2020-10-14 DIAGNOSIS — I48.91 ATRIAL FIBRILLATION, UNSPECIFIED TYPE (H): ICD-10-CM

## 2020-10-14 LAB
CAPILLARY BLOOD COLLECTION: NORMAL
INR PPP: 2.1 (ref 0.86–1.14)

## 2020-10-14 PROCEDURE — 85610 PROTHROMBIN TIME: CPT | Performed by: FAMILY MEDICINE

## 2020-10-14 PROCEDURE — 99207 PR NO CHARGE NURSE ONLY: CPT

## 2020-10-14 PROCEDURE — 36416 COLLJ CAPILLARY BLOOD SPEC: CPT | Performed by: FAMILY MEDICINE

## 2020-10-14 NOTE — PROGRESS NOTES
ANTICOAGULATION MANAGEMENT     Patient Name:  Owen Sahni  Date:  10/14/2020    ASSESSMENT /SUBJECTIVE:    Today's INR result of 2.1 is therapeutic. Goal INR of 2.0-3.0      Warfarin dose taken: Warfarin taken as instructed    Diet: No new diet changes affecting INR    Medication changes/ interactions: No new medications/supplements affecting INR    Previous INR: Subtherapeutic 1.9    S/S of bleeding or thromboembolism: No    New injury or illness: No    Upcoming surgery, procedure or cardioversion: No    Additional findings: None      PLAN:    Telephone call with Owen regarding INR result and instructed:     Warfarin Dosing Instructions: Continue your current warfarin dose 5 mg mon,thu / 7.5 mg row    Instructed patient to follow up no later than: 4 weeks  Lab visit scheduled    Education provided: None required      Collin verbalizes understanding and agrees to warfarin dosing plan.    Instructed to call the Anticoagulation Clinic for any changes, questions or concerns. (#[phone])        Sushila Velazquez RN      OBJECTIVE:  Recent labs: (last 7 days)     10/14/20  1123   INR 2.10*         INR assessment THER    Recheck INR In: 4 WEEKS    INR Location Clinic      Anticoagulation Summary  As of 10/14/2020    INR goal:  2.0-3.0   TTR:  48.9 % (9.1 mo)   INR used for dosin.10 (10/14/2020)   Warfarin maintenance plan:  5 mg (5 mg x 1) every Mon, Thu; 7.5 mg (5 mg x 1.5) all other days   Full warfarin instructions:  5 mg every Mon, Thu; 7.5 mg all other days   Weekly warfarin total:  47.5 mg   No change documented:  Sushila Velazquez RN   Plan last modified:  Sushila Velazquez RN (2020)   Next INR check:  2020   Priority:  Maintenance   Target end date:      Indications    Atrial fibrillation (H) [I48.91]  Long term (current) use of anticoagulants [Z79.01]             Anticoagulation Episode Summary     INR check location:      Preferred lab:      Send INR reminders to:  VIDA EPPERSON     Comments:        Anticoagulation Care Providers     Provider Role Specialty Phone number    Evelio Oliver MD Referring Cardiology [phone]       ANTICOAGULATION FOLLOW-UP CLINIC VISIT    Patient Name:  Owen Sahni  Date:  10/14/2020  Contact Type:  Telephone    SUBJECTIVE:  Patient Findings     Comments:  The patient was assessed for diet, medication, and activity level changes, missed or extra doses, bruising or bleeding, with no problem findings.          Clinical Outcomes     Negatives:  Major bleeding event, Thromboembolic event, Anticoagulation-related hospital admission, Anticoagulation-related ED visit, Anticoagulation-related fatality    Comments:  The patient was assessed for diet, medication, and activity level changes, missed or extra doses, bruising or bleeding, with no problem findings.             OBJECTIVE    Recent labs: (last 7 days)     10/14/20  1123   INR 2.10*       ASSESSMENT / PLAN  INR assessment THER    Recheck INR In: 4 WEEKS    INR Location Clinic      Anticoagulation Summary  As of 10/14/2020    INR goal:  2.0-3.0   TTR:  48.9 % (9.1 mo)   INR used for dosin.10 (10/14/2020)   Warfarin maintenance plan:  5 mg (5 mg x 1) every Mon, Thu; 7.5 mg (5 mg x 1.5) all other days   Full warfarin instructions:  5 mg every Mon, Thu; 7.5 mg all other days   Weekly warfarin total:  47.5 mg   No change documented:  Sushila Velazquez RN   Plan last modified:  Sushila Velazquez RN (2020)   Next INR check:  2020   Priority:  Maintenance   Target end date:      Indications    Atrial fibrillation (H) [I48.91]  Long term (current) use of anticoagulants [Z79.01]             Anticoagulation Episode Summary     INR check location:      Preferred lab:      Send INR reminders to:  VIDA EPPERSON    Comments:        Anticoagulation Care Providers     Provider Role Specialty Phone number    Evelio Oliver MD Referring Cardiology [phone]            See the Encounter Report to view  Anticoagulation Flowsheet and Dosing Calendar (Go to Encounters tab in chart review, and find the Anticoagulation Therapy Visit)    Dosage adjustment made based on physician directed care plan.    Sushila Velazquez RN

## 2020-10-26 ENCOUNTER — TELEPHONE (OUTPATIENT)
Dept: FAMILY MEDICINE | Facility: CLINIC | Age: 60
End: 2020-10-26

## 2020-10-26 DIAGNOSIS — I48.91 ATRIAL FIBRILLATION, UNSPECIFIED TYPE (H): Primary | ICD-10-CM

## 2020-10-26 DIAGNOSIS — I48.91 ATRIAL FIBRILLATION, UNSPECIFIED TYPE (H): ICD-10-CM

## 2020-10-26 RX ORDER — WARFARIN SODIUM 5 MG/1
5 TABLET ORAL DAILY
Qty: 114 TABLET | Refills: 0 | Status: SHIPPED | OUTPATIENT
Start: 2020-10-26 | End: 2021-01-20

## 2020-10-26 NOTE — TELEPHONE ENCOUNTER
ANTICOAGULATION MANAGEMENT      Owen Sahni due for annual renewal of referral to anticoagulation monitoring. Order pended for your review and signature.      ANTICOAGULATION SUMMARY      Warfarin indication(s)     Atrial fibrillation    Heart valve present?  NO       Current goal range   INR: 2.0-3.0     Goal appropriate for indication? Yes, INR 2-3 appropriate for hx of DVT, PE, hypercoagulable state, Afib, LVAD, or bileaflet AVR without risk factors     Current duration of therapy Indefinite/long term therapy   Time in Therapeutic Range (TTR)  (Goal > 60%) 48.9 %       Office visit with referring provider's group within last year yes on 1/3/20 with Khris Velazquez RN

## 2020-11-11 ENCOUNTER — TELEPHONE (OUTPATIENT)
Dept: FAMILY MEDICINE | Facility: CLINIC | Age: 60
End: 2020-11-11

## 2020-11-11 NOTE — TELEPHONE ENCOUNTER
ANTICOAGULATION     Owen Harperen is overdue for INR check.   No show for INR lab apt today. States forgot.    Spoke with Collin and scheduled lab appointment on 11/20/20.  Encouraged to put reminder in his phone to help remember. He  agreed.   Sushila Velazquez RN

## 2020-11-25 ENCOUNTER — TELEPHONE (OUTPATIENT)
Dept: FAMILY MEDICINE | Facility: CLINIC | Age: 60
End: 2020-11-25

## 2020-11-25 NOTE — LETTER
November 25, 2020      Owen Sahni  [address] [address]      Dear Owen,    We are contacting you because our records show you were due for an INR on 11/11/20.      There are potentially serious risks when taking warfarin without careful monitoring, and we want to make sure you are safely managed.    Please call the INR clinic at [phone] and we will be happy to help you schedule an appointment.  If there has been a change in your care, or other concerns, please let us know so we can help and/or update our records.    Sincerely,    Sushila HOPKINS  [facility]   [phone]      MARIO SHEETS MD

## 2020-11-25 NOTE — TELEPHONE ENCOUNTER
ANTICOAGULATION     Owen Sahni is overdue for INR check.   No show for last 2 scheduled appointments for INR.   Reminder letter sent    Sushila Velazquez RN

## 2020-12-04 NOTE — TELEPHONE ENCOUNTER
Patient called back, 1st opening for [place] lab was 12/17/20, patient scheduled.    Palma Walsh RN

## 2020-12-04 NOTE — TELEPHONE ENCOUNTER
ANTICOAGULATION     Owen Sahni is overdue for INR check.      Left message for patient to call and schedule lab appointment as soon as possible. If returning call, please schedule.      Due 11/11/20    Niya oTrres RN

## 2020-12-18 ENCOUNTER — ANTICOAGULATION THERAPY VISIT (OUTPATIENT)
Dept: NURSING | Facility: CLINIC | Age: 60
End: 2020-12-18

## 2020-12-18 DIAGNOSIS — I48.91 ATRIAL FIBRILLATION, UNSPECIFIED TYPE (H): ICD-10-CM

## 2020-12-18 DIAGNOSIS — Z79.01 LONG TERM (CURRENT) USE OF ANTICOAGULANTS: ICD-10-CM

## 2020-12-18 LAB
CAPILLARY BLOOD COLLECTION: NORMAL
INR PPP: 2.7 (ref 0.86–1.14)

## 2020-12-18 PROCEDURE — 99207 PR NO CHARGE NURSE ONLY: CPT

## 2020-12-18 PROCEDURE — 85610 PROTHROMBIN TIME: CPT | Performed by: FAMILY MEDICINE

## 2020-12-18 PROCEDURE — 36416 COLLJ CAPILLARY BLOOD SPEC: CPT | Performed by: FAMILY MEDICINE

## 2020-12-18 NOTE — PROGRESS NOTES
ANTICOAGULATION MANAGEMENT     Patient Name:  Owen Sahni  Date:  2020    ASSESSMENT /SUBJECTIVE:    Today's INR result of 2.7 is therapeutic. Goal INR of 2.0-3.0      Warfarin dose taken: Warfarin taken as instructed    Diet: No new diet changes affecting INR    Medication changes/ interactions: No new medications/supplements affecting INR    Previous INR: Therapeutic     S/S of bleeding or thromboembolism: No    New injury or illness: Yes: reports had pneumonia about a month ago. This has resolved.    Upcoming surgery, procedure or cardioversion: No    Additional findings: last INR 9 weeks ago. Missed several lab apts and states if he does not get a reminder he will not remember apts. Discussed using his phone for reminders and he says he will try to set that up.       PLAN:    Telephone call with Owen regarding INR result and instructed:     Warfarin Dosing Instructions: Continue your current warfarin dose 5 mg Mon, thur and 7.5 mg all other days    Instructed patient to follow up no later than: 5 weeks  Lab visit scheduled    Education provided: Please call back if any changes to your diet, medications or how you've been taking warfarin and Contact the anticoagulation clinic with any changes, questions or concerns at #[phone]       Collin verbalizes understanding and agrees to warfarin dosing plan.    Instructed to call the Anticoagulation Clinic for any changes, questions or concerns. (#[phone])        Sushila Velazquez RN      OBJECTIVE:  Recent labs: (last 7 days)     20  1412   INR 2.70*         No question data found.  Anticoagulation Summary  As of 2020    INR goal:  2.0-3.0   TTR:  67.9 % (9.1 mo)   INR used for dosin.70 (2020)   Warfarin maintenance plan:  5 mg (5 mg x 1) every Mon, Thu; 7.5 mg (5 mg x 1.5) all other days   Full warfarin instructions:  5 mg every Mon, Thu; 7.5 mg all other days   Weekly warfarin total:  47.5 mg   Plan last modified:  Marcus  Sushila JONES RN (2/26/2020)   Next INR check:  12/18/2020   Priority:  Maintenance   Target end date:  10/26/2022    Indications    Atrial fibrillation (H) [I48.91]  Long term (current) use of anticoagulants [Z79.01]  Atrial fibrillation  unspecified type (H) [I48.91]             Anticoagulation Episode Summary     INR check location:      Preferred lab:      Send INR reminders to:  VIDA EPPERSON    Comments:        Anticoagulation Care Providers     Provider Role Specialty Phone number    Evelio Oliver MD Referring Cardiovascular Disease [phone]    Braulio Coronel MD Referring Family Medicine [phone]

## 2021-01-04 DIAGNOSIS — I10 ESSENTIAL HYPERTENSION WITH GOAL BLOOD PRESSURE LESS THAN 140/90: ICD-10-CM

## 2021-01-04 NOTE — LETTER
January 7, 2021    Owen Sahni  [address] [address] [address]    Dear Owen,       We recently received a refill request for losartan (COZAAR) 50 MG tablet .  We were unable to refill this because you are due for a:    Blood pressure/medication check office visit-you have not been in for your blood pressure in over 1 year.      Please call at your earliest convenience so that there will not be a delay with your future refills.          Thank you,   Your [facility] Team/  [phone]

## 2021-01-06 RX ORDER — LOSARTAN POTASSIUM 50 MG/1
TABLET ORAL
Qty: 90 TABLET | Refills: 0 | OUTPATIENT
Start: 2021-01-06

## 2021-01-06 NOTE — TELEPHONE ENCOUNTER
"Routing refill request to provider for review/approval because:  Failed protocol.  No future appointment scheduled. Please advise. Thank you. Lucy Gunderson R.N.  Requested Prescriptions   Pending Prescriptions Disp Refills    losartan (COZAAR) 50 MG tablet [Pharmacy Med Name: LOSARTAN POTASSIUM 50 MG TAB] 90 tablet 0     Sig: TAKE 1 TABLET BY MOUTH EVERY DAY       Angiotensin-II Receptors Failed - 1/4/2021  1:13 PM        Failed - Last blood pressure under 140/90 in past 12 months     BP Readings from Last 3 Encounters:   06/15/20 (!) 158/99   01/03/20 122/86   10/18/19 (!) 144/90                 Failed - Normal serum potassium on file in past 12 months     Recent Labs   Lab Test 06/03/19  0848   POTASSIUM 4.7                    Passed - Recent (12 mo) or future (30 days) visit within the authorizing provider's specialty     Patient has had an office visit with the authorizing provider or a provider within the authorizing providers department within the previous 12 mos or has a future within next 30 days. See \"Patient Info\" tab in inbasket, or \"Choose Columns\" in Meds & Orders section of the refill encounter.              Passed - Medication is active on med list        Passed - Patient is age 18 or older        Passed - Normal serum creatinine on file in past 12 months     Recent Labs   Lab Test 06/08/20  1031   CR 1.00       Ok to refill medication if creatinine is low                     "

## 2021-01-20 DIAGNOSIS — I48.91 ATRIAL FIBRILLATION, UNSPECIFIED TYPE (H): ICD-10-CM

## 2021-01-20 RX ORDER — WARFARIN SODIUM 5 MG/1
5 TABLET ORAL DAILY
Qty: 114 TABLET | Refills: 0 | Status: SHIPPED | OUTPATIENT
Start: 2021-01-20 | End: 2021-03-11

## 2021-02-12 ENCOUNTER — ANTICOAGULATION THERAPY VISIT (OUTPATIENT)
Dept: NURSING | Facility: CLINIC | Age: 61
End: 2021-02-12

## 2021-02-12 DIAGNOSIS — I48.91 ATRIAL FIBRILLATION, UNSPECIFIED TYPE (H): ICD-10-CM

## 2021-02-12 DIAGNOSIS — Z79.01 LONG TERM (CURRENT) USE OF ANTICOAGULANTS: ICD-10-CM

## 2021-02-12 LAB
CAPILLARY BLOOD COLLECTION: NORMAL
INR PPP: 1.8 (ref 0.86–1.14)

## 2021-02-12 PROCEDURE — 99207 PR NO CHARGE NURSE ONLY: CPT

## 2021-02-12 PROCEDURE — 36416 COLLJ CAPILLARY BLOOD SPEC: CPT | Performed by: FAMILY MEDICINE

## 2021-02-12 PROCEDURE — 85610 PROTHROMBIN TIME: CPT | Performed by: FAMILY MEDICINE

## 2021-02-12 NOTE — PROGRESS NOTES
ANTICOAGULATION MANAGEMENT     Patient Name:  Owen Sahni  Date:  2/12/2021    ASSESSMENT /SUBJECTIVE:    Today's INR result of 1.80 is subtherapeutic. Goal INR of 2.0-3.0      Warfarin dose taken: reports has been taking differently than in chart directions for months    Diet: diet varies a little with work    Medication changes/ interactions: No new medications/supplements affecting INR    Previous INR: Therapeutic     S/S of bleeding or thromboembolism: No    New injury or illness: No    Upcoming surgery, procedure or cardioversion: No    Additional findings: repeatedly asked patient what dose he was taking and was reversed of what has been in chart for past 6 months. He states he has been taking 7.5 mg on Mon and Thur and 5 mg all other days and says has not changed in all these months. I asked if he looks at bottle of pills and directions and he says no, he just fills his pill ruben every week like he has done for months. Denies any missed dose or any concerns. Diet varies due to work and he misses lab apts at times due to his work as . Will continue same dose and dose calendar updated with his current dose.       PLAN:    Telephone call with Owen regarding INR result and instructed:     Warfarin Dosing Instructions: Continue your current warfarin dose 7.5 mg Mon,Thur and 5 mg all other days     Instructed patient to follow up no later than: 2 weeks  Lab visit scheduled pushed out to 3 wks due to schedule.    Education provided: Please call back if any changes to your diet, medications or how you've been taking warfarin      Collin verbalizes understanding and agrees to warfarin dosing plan.    Instructed to call the Anticoagulation Clinic for any changes, questions or concerns. (#[phone])        Sushila Velazquez RN      OBJECTIVE:  Recent labs: (last 7 days)     02/12/21  1402   INR 1.80*         No question data found.  Anticoagulation Summary  As of 2/12/2021    INR goal:  2.0-3.0   TTR:   79.7 % (9.1 mo)   INR used for dosin.80 (2021)   Warfarin maintenance plan:  5 mg (5 mg x 1) every Mon, Thu; 7.5 mg (5 mg x 1.5) all other days   Full warfarin instructions:  5 mg every Mon, Thu; 7.5 mg all other days   Weekly warfarin total:  47.5 mg   Plan last modified:  Sushila Velazquez RN (2020)   Next INR check:     Priority:  Maintenance   Target end date:  10/26/2022    Indications    Atrial fibrillation (H) [I48.91]  Long term (current) use of anticoagulants [Z79.01]  Atrial fibrillation  unspecified type (H) [I48.91]             Anticoagulation Episode Summary     INR check location:      Preferred lab:      Send INR reminders to:  VIDA EPPERSON    Comments:        Anticoagulation Care Providers     Provider Role Specialty Phone number    Evelio Oliver MD Referring Cardiovascular Disease [phone]    Braulio Coronel MD Referring Family Medicine [phone]

## 2021-02-12 NOTE — PROGRESS NOTES
Chart addendum for change in weekly dose that patient reported on [facility] visit 2/12/21. Patient reported that he has been taking dose differently than in chart directions. Taking 7.5 mg Mon and Thursday and 5 mg all other days instead of 5 mg Mon,Thur and 7.5 mg all other days. Reported he has been taking this dose for months.  Sushila Velazquez RN

## 2021-03-09 ENCOUNTER — TELEPHONE (OUTPATIENT)
Dept: FAMILY MEDICINE | Facility: CLINIC | Age: 61
End: 2021-03-09

## 2021-03-09 DIAGNOSIS — I10 ESSENTIAL HYPERTENSION WITH GOAL BLOOD PRESSURE LESS THAN 140/90: ICD-10-CM

## 2021-03-09 NOTE — TELEPHONE ENCOUNTER
ANTICOAGULATION     Owen Sahni is overdue for INR check.      Left message for patient to call and schedule lab appointment as soon as possible. If returning call, please schedule.   INR due 3/5/21  Sushila Velazquez RN

## 2021-03-09 NOTE — TELEPHONE ENCOUNTER
Routing refill request to provider for review/approval because:  Labs not current:    Potassium   Date Value Ref Range Status   06/03/2019 4.7 3.4 - 5.3 mmol/L Final       BP Readings from Last 6 Encounters:   06/15/20 (!) 158/99   01/03/20 122/86   10/18/19 (!) 144/90   07/10/19 (!) 160/94   06/03/19 136/88   06/08/18 122/76     Ynes Alvarez RN

## 2021-03-10 DIAGNOSIS — I10 ESSENTIAL HYPERTENSION WITH GOAL BLOOD PRESSURE LESS THAN 140/90: ICD-10-CM

## 2021-03-10 DIAGNOSIS — L28.1 PRURIGO NODULARIS: ICD-10-CM

## 2021-03-10 NOTE — TELEPHONE ENCOUNTER
Routing refill request to provider for review/approval because:  Labs not current:  Potassium    BP Readings from Last 3 Encounters:   06/15/20 (!) 158/99   01/03/20 122/86   10/18/19 (!) 144/90     Chanel Gonzales BSN, RN

## 2021-03-11 ENCOUNTER — ANTICOAGULATION THERAPY VISIT (OUTPATIENT)
Dept: NURSING | Facility: CLINIC | Age: 61
End: 2021-03-11

## 2021-03-11 DIAGNOSIS — I48.91 ATRIAL FIBRILLATION, UNSPECIFIED TYPE (H): ICD-10-CM

## 2021-03-11 DIAGNOSIS — Z79.01 LONG TERM (CURRENT) USE OF ANTICOAGULANTS: ICD-10-CM

## 2021-03-11 LAB
CAPILLARY BLOOD COLLECTION: NORMAL
INR PPP: 1.4 (ref 0.86–1.14)

## 2021-03-11 PROCEDURE — 85610 PROTHROMBIN TIME: CPT | Performed by: FAMILY MEDICINE

## 2021-03-11 PROCEDURE — 36416 COLLJ CAPILLARY BLOOD SPEC: CPT | Performed by: FAMILY MEDICINE

## 2021-03-11 PROCEDURE — 99207 PR NO CHARGE NURSE ONLY: CPT

## 2021-03-11 RX ORDER — LOSARTAN POTASSIUM 50 MG/1
TABLET ORAL
Qty: 90 TABLET | Refills: 0 | Status: SHIPPED | OUTPATIENT
Start: 2021-03-11 | End: 2021-06-03 | Stop reason: DRUGHIGH

## 2021-03-11 RX ORDER — LOSARTAN POTASSIUM 50 MG/1
TABLET ORAL
Qty: 30 TABLET | Refills: 0 | Status: SHIPPED | OUTPATIENT
Start: 2021-03-11 | End: 2021-06-03

## 2021-03-11 RX ORDER — WARFARIN SODIUM 5 MG/1
5 TABLET ORAL DAILY
Qty: 114 TABLET | Refills: 1 | Status: SHIPPED | OUTPATIENT
Start: 2021-03-11 | End: 2021-03-11

## 2021-03-11 RX ORDER — CETIRIZINE HYDROCHLORIDE 10 MG/1
10 TABLET ORAL DAILY
Qty: 90 TABLET | Refills: 1 | Status: SHIPPED | OUTPATIENT
Start: 2021-03-11

## 2021-03-11 RX ORDER — WARFARIN SODIUM 5 MG/1
5 TABLET ORAL DAILY
Qty: 114 TABLET | Refills: 1
Start: 2021-03-11 | End: 2021-04-16

## 2021-03-11 NOTE — TELEPHONE ENCOUNTER
Rx refilled per Anticoagulation protocol.    Need PCP signature per protocol.    CARRIE FariaN, RN  Anticoagulation Clinic

## 2021-03-11 NOTE — PROGRESS NOTES
ANTICOAGULATION MANAGEMENT     Patient Name:  Owen Sahni  Date:  3/11/2021    ASSESSMENT /SUBJECTIVE:    Today's INR result of 1.40 is subtherapeutic. Goal INR of 2.0-3.0      Warfarin dose taken: Warfarin taken differently, but did not change total weekly dose    Diet: No new diet changes affecting INR    Medication changes/ interactions: No new medications/supplements affecting INR    Previous INR: Subtherapeutic     S/S of bleeding or thromboembolism: No    New injury or illness: No    Upcoming surgery, procedure or cardioversion: No    Additional findings: reports he takes 7.5 mg on Tuesday and Thursday which is different than on dose calendar. Denies any missed dose or change in diet or meds. Reports his ongoing rash has improved some and continues on antihistamines for this. Works as  and schedule varies and this is why he misses apts. He will call for next apt when he gets his work schedule.       PLAN:    Telephone call with Owen regarding INR result and instructed:     Warfarin Dosing Instructions: 10 mg today then change your warfarin dose to 7.5 mg tue,thur,sat and 5mg all other days  . (6.3 % change)    Instructed patient to follow up no later than: 2 weeks  Patient offered & declined to schedule next visit he will call when he gets his work schedule    Education provided: Please call back if any changes to your diet, medications or how you've been taking warfarin      Collin verbalizes understanding and agrees to warfarin dosing plan.    Instructed to call the Anticoagulation Clinic for any changes, questions or concerns. (#[phone])        Sushila Velazquez RN      OBJECTIVE:  Recent labs: (last 7 days)     21  1016   INR 1.40*         No question data found.  Anticoagulation Summary  As of 3/11/2021    INR goal:  2.0-3.0   TTR:  75.6 % (9.1 mo)   INR used for dosin.40 (3/11/2021)   Warfarin maintenance plan:  7.5 mg (5 mg x 1.5) every Mon, Thu; 5 mg (5 mg x 1) all other  days   Full warfarin instructions:  7.5 mg every Mon, Thu; 5 mg all other days   Weekly warfarin total:  40 mg   Plan last modified:  Sushila Velazquez RN (2/12/2021)   Next INR check:     Priority:  High   Target end date:  10/26/2022    Indications    Atrial fibrillation (H) [I48.91]  Long term (current) use of anticoagulants [Z79.01]  Atrial fibrillation  unspecified type (H) [I48.91]             Anticoagulation Episode Summary     INR check location:      Preferred lab:      Send INR reminders to:  VIDA EPPERSON    Comments:        Anticoagulation Care Providers     Provider Role Specialty Phone number    Evelio Oliver MD Referring Cardiovascular Disease [phone]    Braulio Coronel MD Referring Family Medicine [phone]

## 2021-03-11 NOTE — TELEPHONE ENCOUNTER
Pt stopped into the clinic to request his warfarin , he has a INR appt today and would like to know if he needs to be seen in order to get med?     Maria Victoria Lloyd, [place]  Staff

## 2021-03-26 ENCOUNTER — TELEPHONE (OUTPATIENT)
Dept: FAMILY MEDICINE | Facility: CLINIC | Age: 61
End: 2021-03-26

## 2021-03-26 NOTE — TELEPHONE ENCOUNTER
ANTICOAGULATION     Owen Sahni is overdue for INR check.      Spoke with Collin and scheduled lab appointment on 4/6/21    Sushila Velazquez RN

## 2021-03-31 ENCOUNTER — IMMUNIZATION (OUTPATIENT)
Dept: PEDIATRICS | Facility: CLINIC | Age: 61
End: 2021-03-31
Payer: COMMERCIAL

## 2021-03-31 PROCEDURE — 91300 PR COVID VAC PFIZER DIL RECON 30 MCG/0.3 ML IM: CPT

## 2021-03-31 PROCEDURE — 0001A PR COVID VAC PFIZER DIL RECON 30 MCG/0.3 ML IM: CPT

## 2021-04-01 ENCOUNTER — DOCUMENTATION ONLY (OUTPATIENT)
Dept: ADMINISTRATIVE | Facility: CLINIC | Age: 61
End: 2021-04-01

## 2021-04-09 ENCOUNTER — TELEPHONE (OUTPATIENT)
Dept: FAMILY MEDICINE | Facility: CLINIC | Age: 61
End: 2021-04-09

## 2021-04-09 NOTE — TELEPHONE ENCOUNTER
ANTICOAGULATION     Owen Sahni is overdue for INR check.      Spoke with Collin and scheduled lab appointment on 4/13    Sushila Velazquez RN

## 2021-04-13 ENCOUNTER — ANTICOAGULATION THERAPY VISIT (OUTPATIENT)
Dept: NURSING | Facility: CLINIC | Age: 61
End: 2021-04-13

## 2021-04-13 ENCOUNTER — TELEPHONE (OUTPATIENT)
Dept: FAMILY MEDICINE | Facility: CLINIC | Age: 61
End: 2021-04-13

## 2021-04-13 DIAGNOSIS — I48.91 ATRIAL FIBRILLATION, UNSPECIFIED TYPE (H): ICD-10-CM

## 2021-04-13 DIAGNOSIS — Z79.01 LONG TERM (CURRENT) USE OF ANTICOAGULANTS: ICD-10-CM

## 2021-04-13 LAB
CAPILLARY BLOOD COLLECTION: NORMAL
INR PPP: 1.8 (ref 0.86–1.14)

## 2021-04-13 PROCEDURE — 99207 PR NO CHARGE NURSE ONLY: CPT

## 2021-04-13 PROCEDURE — 36416 COLLJ CAPILLARY BLOOD SPEC: CPT | Performed by: FAMILY MEDICINE

## 2021-04-13 PROCEDURE — 85610 PROTHROMBIN TIME: CPT | Performed by: FAMILY MEDICINE

## 2021-04-13 NOTE — TELEPHONE ENCOUNTER
Patient would like a call back to schedule apt with pcp for follow up on BP and blood in urine. Thank you. Sushila Velazquez RN

## 2021-04-13 NOTE — PROGRESS NOTES
ANTICOAGULATION MANAGEMENT     Patient Name:  Owne Sahni  Date:  2021    ASSESSMENT /SUBJECTIVE:    Today's INR result of 1.80 is subtherapeutic. 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 He will schedule apt with provider for this in  Next 1-2 wks and can check INR same day      PLAN:    Telephone call with Owen regarding INR result and instructed:     Warfarin Dosing Instructions: Change your warfarin dose to 5 mg Mon,Wed,Fri and 7.5 mg all other days  . (5.9 % change)    Instructed patient to follow up no later than: 2 weeks  Check at provider office visit    Education provided: Please call back if any changes to your diet, medications or how you've been taking warfarin      Collin verbalizes understanding and agrees to warfarin dosing plan.    Instructed to call the Anticoagulation Clinic for any changes, questions or concerns. (#[phone])        Sushila Velazquez RN      OBJECTIVE:  Recent labs: (last 7 days)     21  0812   INR 1.80*         No question data found.  Anticoagulation Summary  As of 2021    INR goal:  2.0-3.0   TTR:  71.0 % (9.1 mo)   INR used for dosin.80 (2021)   Warfarin maintenance plan:  7.5 mg (5 mg x 1.5) every Tue, Thu, Sat; 5 mg (5 mg x 1) all other days   Full warfarin instructions:  7.5 mg every Tue, Thu, Sat; 5 mg all other days   Weekly warfarin total:  42.5 mg   Plan last modified:  Sushila Velazquez RN (3/11/2021)   Next INR check:     Priority:  High   Target end date:  10/26/2022    Indications    Atrial fibrillation (H) [I48.91]  Long  term (current) use of anticoagulants [Z79.01]  Atrial fibrillation  unspecified type (H) [I48.91]             Anticoagulation Episode Summary     INR check location:      Preferred lab:      Send INR reminders to:  VIDA EPPERSON    Comments:        Anticoagulation Care Providers     Provider Role Specialty Phone number    Evelio Oliver MD Referring Cardiovascular Disease [phone]    Braulio Coronel MD Referring Family Medicine [phone]

## 2021-04-16 ENCOUNTER — ANTICOAGULATION THERAPY VISIT (OUTPATIENT)
Dept: NURSING | Facility: CLINIC | Age: 61
End: 2021-04-16

## 2021-04-16 ENCOUNTER — ALLIED HEALTH/NURSE VISIT (OUTPATIENT)
Dept: FAMILY MEDICINE | Facility: CLINIC | Age: 61
End: 2021-04-16

## 2021-04-16 VITALS — DIASTOLIC BLOOD PRESSURE: 80 MMHG | HEART RATE: 70 BPM | SYSTOLIC BLOOD PRESSURE: 130 MMHG

## 2021-04-16 DIAGNOSIS — Z79.01 LONG TERM (CURRENT) USE OF ANTICOAGULANTS: ICD-10-CM

## 2021-04-16 DIAGNOSIS — I48.91 ATRIAL FIBRILLATION, UNSPECIFIED TYPE (H): ICD-10-CM

## 2021-04-16 DIAGNOSIS — Z01.30 BLOOD PRESSURE CHECK: Primary | ICD-10-CM

## 2021-04-16 LAB
CAPILLARY BLOOD COLLECTION: NORMAL
INR PPP: 1.7 (ref 0.86–1.14)

## 2021-04-16 PROCEDURE — 36416 COLLJ CAPILLARY BLOOD SPEC: CPT | Performed by: FAMILY MEDICINE

## 2021-04-16 PROCEDURE — 85610 PROTHROMBIN TIME: CPT | Performed by: FAMILY MEDICINE

## 2021-04-16 PROCEDURE — 99207 PR NO CHARGE NURSE ONLY: CPT | Performed by: FAMILY MEDICINE

## 2021-04-16 PROCEDURE — 99207 PR NO CHARGE NURSE ONLY: CPT

## 2021-04-16 RX ORDER — WARFARIN SODIUM 5 MG/1
5 TABLET ORAL DAILY
Qty: 114 TABLET | Refills: 1
Start: 2021-04-16 | End: 2021-05-26

## 2021-04-16 NOTE — PROGRESS NOTES
Owen Sahni was evaluated at [facility] on April 16, 2021 at which time his blood pressure was:    BP Readings from Last 3 Encounters:   04/16/21 130/80   06/15/20 (!) 158/99   01/03/20 122/86     Pulse Readings from Last 3 Encounters:   04/16/21 70   06/15/20 57   01/03/20 73       Reviewed lifestyle modifications for blood pressure control and reduction: including making healthy food choices, managing weight, getting regular exercise, smoking cessation, reducing alcohol consumption, monitoring blood pressure regularly.     Symptoms: None    BP Goal:< 140/90 mmHg    BP Assessment:  BP at goal    Potential Reasons for BP too high: NA - Not applicable    BP Follow-Up Plan: Recheck BP in 6 months at pharmacy    Recommendation to Provider: None    Note completed by: Albaro Garcia RPh.  [facility]  [phone]

## 2021-04-29 ENCOUNTER — TELEPHONE (OUTPATIENT)
Dept: NURSING | Facility: CLINIC | Age: 61
End: 2021-04-29

## 2021-04-29 NOTE — TELEPHONE ENCOUNTER
ANTICOAGULATION     Owen ADONIS Sahni is overdue for INR check.      Left message for patient to call and schedule lab appointment as soon as possible. If returning call, please schedule.     INR was due: 4/26    Last INR result:  INR   Date Value Ref Range Status   04/16/2021 1.70 (H) 0.86 - 1.14 Final     Comment:     This test is intended for monitoring Coumadin therapy.  Results are not   accurate in patients with prolonged INR due to factor deficiency.          Shonda Robbins RN CACP

## 2021-05-06 ENCOUNTER — TELEPHONE (OUTPATIENT)
Dept: FAMILY MEDICINE | Facility: CLINIC | Age: 61
End: 2021-05-06

## 2021-05-06 NOTE — TELEPHONE ENCOUNTER
ANTICOAGULATION     wOen Sahni is overdue for INR check.      Spoke with Collin and scheduled lab appointment on 5/12/21    Sushila Velazquez RN

## 2021-05-12 ENCOUNTER — ANTICOAGULATION THERAPY VISIT (OUTPATIENT)
Dept: NURSING | Facility: CLINIC | Age: 61
End: 2021-05-12

## 2021-05-12 DIAGNOSIS — I48.91 ATRIAL FIBRILLATION, UNSPECIFIED TYPE (H): ICD-10-CM

## 2021-05-12 DIAGNOSIS — Z79.01 LONG TERM (CURRENT) USE OF ANTICOAGULANTS: ICD-10-CM

## 2021-05-12 LAB
CAPILLARY BLOOD COLLECTION: NORMAL
INR PPP: 1.6 (ref 0.86–1.14)

## 2021-05-12 PROCEDURE — 36416 COLLJ CAPILLARY BLOOD SPEC: CPT | Performed by: FAMILY MEDICINE

## 2021-05-12 PROCEDURE — 99207 PR NO CHARGE NURSE ONLY: CPT

## 2021-05-12 PROCEDURE — 85610 PROTHROMBIN TIME: CPT | Performed by: FAMILY MEDICINE

## 2021-05-12 NOTE — PROGRESS NOTES
ANTICOAGULATION MANAGEMENT     Patient Name:  Owen Sahni  Date:  2021    ASSESSMENT /SUBJECTIVE:    Today's INR result of 1.60 is subtherapeutic. Goal INR of 2.0-3.0      Warfarin dose taken: Warfarin taken as instructed    Diet: No new diet changes affecting INR    Medication changes/ interactions: No new medications/supplements affecting INR    Previous INR: Subtherapeutic     S/S of bleeding or thromboembolism: No    New injury or illness: No    Upcoming surgery, procedure or cardioversion: No    Additional findings: missed apt 2 wks ago. Reminded again to put reminder in his phone for apt time. Needs am apts only due to work schedule as       PLAN:    Telephone call with Owen regarding INR result and instructed:     Warfarin Dosing Instructions: Change your warfarin dose to 5 mg Monday and 7.5 mg all other days  . (11.1 % change)    Instructed patient to follow up no later than: 2 weeks  Lab visit scheduled    Education provided: Please call back if any changes to your diet, medications or how you've been taking warfarin      Collin verbalizes understanding and agrees to warfarin dosing plan.    Instructed to call the Anticoagulation Clinic for any changes, questions or concerns. (#[phone])        Sushila Velazquez RN      OBJECTIVE:  Recent labs: (last 7 days)     21  0952   INR 1.60*         No question data found.  Anticoagulation Summary  As of 2021    INR goal:  2.0-3.0   TTR:  60.4 % (9.1 mo)   INR used for dosin.60 (2021)   Warfarin maintenance plan:  5 mg (5 mg x 1) every Mon, Wed, Fri; 7.5 mg (5 mg x 1.5) all other days   Full warfarin instructions:  5 mg every Mon, Wed, Fri; 7.5 mg all other days   Weekly warfarin total:  45 mg   Plan last modified:  Sushila Velazquez RN (2021)   Next INR check:     Priority:  High   Target end date:  10/26/2022    Indications    Atrial fibrillation (H) [I48.91]  Long term (current) use of anticoagulants  [Z79.01]  Atrial fibrillation  unspecified type (H) [I48.91]             Anticoagulation Episode Summary     INR check location:      Preferred lab:      Send INR reminders to:  [facility]    Comments:        Anticoagulation Care Providers     Provider Role Specialty Phone number    Evelio Oliver MD Referring Cardiovascular Disease [phone]    Braulio Coronel MD Referring Family Medicine [phone]

## 2021-05-26 ENCOUNTER — ANTICOAGULATION THERAPY VISIT (OUTPATIENT)
Dept: FAMILY MEDICINE | Facility: CLINIC | Age: 61
End: 2021-05-26

## 2021-05-26 DIAGNOSIS — I48.91 ATRIAL FIBRILLATION, UNSPECIFIED TYPE (H): ICD-10-CM

## 2021-05-26 DIAGNOSIS — Z79.01 LONG TERM (CURRENT) USE OF ANTICOAGULANTS: ICD-10-CM

## 2021-05-26 LAB
CAPILLARY BLOOD COLLECTION: NORMAL
INR PPP: 2.8 (ref 0.86–1.14)

## 2021-05-26 PROCEDURE — 36416 COLLJ CAPILLARY BLOOD SPEC: CPT | Performed by: FAMILY MEDICINE

## 2021-05-26 PROCEDURE — 85610 PROTHROMBIN TIME: CPT | Performed by: FAMILY MEDICINE

## 2021-05-26 RX ORDER — WARFARIN SODIUM 5 MG/1
5 TABLET ORAL DAILY
Qty: 140 TABLET | Refills: 1 | Status: SHIPPED | OUTPATIENT
Start: 2021-05-26 | End: 2021-06-23

## 2021-05-26 NOTE — PROGRESS NOTES
ANTICOAGULATION MANAGEMENT     Patient Name:  Owen Sahni  Date:  2021    ASSESSMENT /SUBJECTIVE:    Today's INR result of 2.8 is therapeutic. Goal INR of 2.0-3.0      Warfarin dose taken: Warfarin taken as instructed    Diet: No new diet changes affecting INR    Medication changes/ interactions: No new medications/supplements affecting INR    Previous INR: Subtherapeutic     S/S of bleeding or thromboembolism: No    New injury or illness: No    Upcoming surgery, procedure or cardioversion: No    Additional findings: None      PLAN:    Telephone call with Owen regarding INR result and instructed:     Warfarin Dosing Instructions: Continue your current warfarin dose 5 mg Mon and 7.5 mg ROW    Instructed patient to follow up no later than: 3 weeks  Lab visit scheduled    Education provided: Target INR goal and significance of current INR result      Collin verbalizes understanding and agrees to warfarin dosing plan.    Instructed to call the Anticoagulation Clinic for any changes, questions or concerns. (#[phone])        Ruthy Barrow RN      OBJECTIVE:  Recent labs: (last 7 days)     21  0956   INR 2.80*         No question data found.  Anticoagulation Summary  As of 2021    INR goal:  2.0-3.0   TTR:  58.7 % (9.1 mo)   INR used for dosin.80 (2021)   Warfarin maintenance plan:  5 mg (5 mg x 1) every Mon; 7.5 mg (5 mg x 1.5) all other days   Full warfarin instructions:  5 mg every Mon; 7.5 mg all other days   Weekly warfarin total:  50 mg   No change documented:  Ruthy Barrow RN   Plan last modified:  Sushila Velazquez RN (2021)   Next INR check:  2021   Priority:  High   Target end date:  10/26/2022    Indications    Atrial fibrillation (H) [I48.91]  Long term (current) use of anticoagulants [Z79.01]  Atrial fibrillation  unspecified type (H) [I48.91]             Anticoagulation Episode Summary     INR check location:      Preferred lab:      Send INR reminders to:   [facility]    Comments:        Anticoagulation Care Providers     Provider Role Specialty Phone number    Evelio Oliver MD Referring Cardiovascular Disease [phone]    Braulio Coronel MD Referring Family Medicine [phone]

## 2021-05-27 DIAGNOSIS — I10 ESSENTIAL HYPERTENSION WITH GOAL BLOOD PRESSURE LESS THAN 140/90: ICD-10-CM

## 2021-05-27 NOTE — TELEPHONE ENCOUNTER
"Requested Prescriptions   Pending Prescriptions Disp Refills    losartan (COZAAR) 50 MG tablet [Pharmacy Med Name: LOSARTAN POTASSIUM 50 MG TAB] 90 tablet 0     Sig: TAKE 1 TABLET BY MOUTH EVERY DAY       Angiotensin-II Receptors Failed - 5/27/2021 11:54 AM        Failed - Recent (12 mo) or future (30 days) visit within the authorizing provider's specialty     Patient has had an office visit with the authorizing provider or a provider within the authorizing providers department within the previous 12 mos or has a future within next 30 days. See \"Patient Info\" tab in inbasket, or \"Choose Columns\" in Meds & Orders section of the refill encounter.              Failed - Normal serum potassium on file in past 12 months     Recent Labs   Lab Test 06/03/19  0848   POTASSIUM 4.7                    Passed - Last blood pressure under 140/90 in past 12 months     BP Readings from Last 3 Encounters:   04/16/21 130/80   06/15/20 (!) 158/99   01/03/20 122/86                 Passed - Medication is active on med list        Passed - Patient is age 18 or older        Passed - Normal serum creatinine on file in past 12 months     Recent Labs   Lab Test 06/08/20  1031   CR 1.00       Ok to refill medication if creatinine is low               "

## 2021-05-28 RX ORDER — LOSARTAN POTASSIUM 50 MG/1
TABLET ORAL
Qty: 90 TABLET | Refills: 0 | OUTPATIENT
Start: 2021-05-28

## 2021-06-03 ENCOUNTER — OFFICE VISIT (OUTPATIENT)
Dept: FAMILY MEDICINE | Facility: CLINIC | Age: 61
End: 2021-06-03
Payer: COMMERCIAL

## 2021-06-03 VITALS
OXYGEN SATURATION: 96 % | SYSTOLIC BLOOD PRESSURE: 142 MMHG | HEART RATE: 73 BPM | BODY MASS INDEX: 42.66 KG/M2 | HEIGHT: 72 IN | DIASTOLIC BLOOD PRESSURE: 90 MMHG | WEIGHT: 315 LBS | TEMPERATURE: 97.1 F

## 2021-06-03 DIAGNOSIS — I10 ESSENTIAL HYPERTENSION WITH GOAL BLOOD PRESSURE LESS THAN 140/90: ICD-10-CM

## 2021-06-03 DIAGNOSIS — I42.1 HYPERTROPHIC OBSTRUCTIVE CARDIOMYOPATHY (H): ICD-10-CM

## 2021-06-03 DIAGNOSIS — N40.1 BENIGN PROSTATIC HYPERPLASIA WITH NOCTURIA: Primary | ICD-10-CM

## 2021-06-03 DIAGNOSIS — R35.1 BENIGN PROSTATIC HYPERPLASIA WITH NOCTURIA: Primary | ICD-10-CM

## 2021-06-03 LAB
ALBUMIN UR-MCNC: ABNORMAL MG/DL
APPEARANCE UR: CLEAR
BILIRUB UR QL STRIP: NEGATIVE
COLOR UR AUTO: YELLOW
GLUCOSE UR STRIP-MCNC: NEGATIVE MG/DL
HGB UR QL STRIP: ABNORMAL
HYALINE CASTS #/AREA URNS LPF: ABNORMAL /LPF
KETONES UR STRIP-MCNC: NEGATIVE MG/DL
LEUKOCYTE ESTERASE UR QL STRIP: NEGATIVE
NITRATE UR QL: NEGATIVE
NON-SQ EPI CELLS #/AREA URNS LPF: ABNORMAL /LPF
PH UR STRIP: 5.5 PH (ref 5–7)
RBC #/AREA URNS AUTO: ABNORMAL /HPF
SOURCE: ABNORMAL
SP GR UR STRIP: 1.02 (ref 1–1.03)
UROBILINOGEN UR STRIP-ACNC: 0.2 EU/DL (ref 0.2–1)
WBC #/AREA URNS AUTO: ABNORMAL /HPF

## 2021-06-03 PROCEDURE — 81001 URINALYSIS AUTO W/SCOPE: CPT | Performed by: FAMILY MEDICINE

## 2021-06-03 PROCEDURE — 99214 OFFICE O/P EST MOD 30 MIN: CPT | Performed by: FAMILY MEDICINE

## 2021-06-03 RX ORDER — LOSARTAN POTASSIUM 100 MG/1
100 TABLET ORAL DAILY
Qty: 90 TABLET | Refills: 3 | Status: SHIPPED | OUTPATIENT
Start: 2021-06-03 | End: 2022-05-24

## 2021-06-03 RX ORDER — METOPROLOL SUCCINATE 50 MG/1
50 TABLET, EXTENDED RELEASE ORAL DAILY
Qty: 90 TABLET | Refills: 3 | Status: SHIPPED | OUTPATIENT
Start: 2021-06-03 | End: 2024-06-19

## 2021-06-03 ASSESSMENT — MIFFLIN-ST. JEOR: SCORE: 2380.69

## 2021-06-03 NOTE — PATIENT INSTRUCTIONS
1. I will contact you about your results.    2. Increase the Losartan to 100 mg daily.    3. Check the blood pressure several times per month - ideally it should be 130/80 or less. But the cut off is 140/90.    4. Weigh yourself every morning after urinating and without clothes - if you gain 5# in 1 week, let me know.    5. Set up the urology appointment - see phone number below.    6. See you soon for a physical with fasting labs.
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
## 2021-06-03 NOTE — LETTER
June 7, 2021      Owen Sahni  [address] [address]      Josesito Collin,     The urine did not show any infection. There were some red blood cells. Please discuss this with your urologist.     Regards,     Braulio Coronel M.D.     Resulted Orders   UA with Microscopic reflex to Culture   Result Value Ref Range    Color Urine Yellow     Appearance Urine Clear     Glucose Urine Negative NEG^Negative mg/dL    Bilirubin Urine Negative NEG^Negative    Ketones Urine Negative NEG^Negative mg/dL    Specific Gravity Urine 1.025 1.003 - 1.035    pH Urine 5.5 5.0 - 7.0 pH    Protein Albumin Urine Trace (A) NEG^Negative mg/dL    Urobilinogen Urine 0.2 0.2 - 1.0 EU/dL    Nitrite Urine Negative NEG^Negative    Blood Urine Small (A) NEG^Negative    Leukocyte Esterase Urine Negative NEG^Negative    Source Midstream Urine     WBC Urine 0 - 5 OTO5^0 - 5 /HPF    RBC Urine 2-5 (A) OTO2^O - 2 /HPF    Hyaline Casts 10-25 (A) OTO2^O - 2 /LPF    Squamous Epithelial /LPF Urine Few FEW^Few /LPF       If you have any questions or concerns, please call the clinic at the number listed above.       Sincerely,  Braulio Coronel MD/sp

## 2021-06-21 ENCOUNTER — TELEPHONE (OUTPATIENT)
Dept: FAMILY MEDICINE | Facility: CLINIC | Age: 61
End: 2021-06-21

## 2021-06-21 NOTE — TELEPHONE ENCOUNTER
ANTICOAGULATION   INR was due 6/16  Owen Sahni is overdue for INR check.      Spoke with Collin and scheduled lab appointment on 6/23  Note patient reports he changed his warfarin dose because he woke up with a bruise on his face a couple weeks ago. Advised he should always call with concerns like bruising and discuss with nurse before changing his dose. He cannot tell me at this time what he changed his dose to but has it written down at home.   Sushila Velazquez RN

## 2021-06-23 ENCOUNTER — ANTICOAGULATION THERAPY VISIT (OUTPATIENT)
Dept: NURSING | Facility: CLINIC | Age: 61
End: 2021-06-23

## 2021-06-23 DIAGNOSIS — I48.91 ATRIAL FIBRILLATION, UNSPECIFIED TYPE (H): ICD-10-CM

## 2021-06-23 DIAGNOSIS — Z79.01 LONG TERM (CURRENT) USE OF ANTICOAGULANTS: ICD-10-CM

## 2021-06-23 LAB
CAPILLARY BLOOD COLLECTION: NORMAL
INR PPP: 3 (ref 0.86–1.14)

## 2021-06-23 PROCEDURE — 85610 PROTHROMBIN TIME: CPT | Performed by: FAMILY MEDICINE

## 2021-06-23 PROCEDURE — 36416 COLLJ CAPILLARY BLOOD SPEC: CPT | Performed by: FAMILY MEDICINE

## 2021-06-23 RX ORDER — WARFARIN SODIUM 5 MG/1
TABLET ORAL
Qty: 140 TABLET | Refills: 1 | COMMUNITY
Start: 2021-06-23 | End: 2021-09-28

## 2021-06-23 NOTE — PROGRESS NOTES
ANTICOAGULATION MANAGEMENT     Owen Sahni 60 year old male is on warfarin with therapeutic INR result. (Goal INR 2.0-3.0)    Recent labs: (last 7 days)     06/23/21  0918   INR 3.00*       ASSESSMENT     Source(s): Patient/Caregiver Call       Warfarin doses taken: Less warfarin taken than planned which may be affecting INR -- patient has been taking 5mg every other day, alternating with 7.5mg     Diet: No new diet changes identified    New illness, injury, or hospitalization: No    Medication/supplement changes: None noted    Signs or symptoms of bleeding or clotting: No    Previous INR: Therapeutic last visit; previously outside of goal range    Additional findings: None     PLAN     Recommended plan for ongoing change(s) affecting INR     Dosing Instructions:  Decrease your warfarin dose (10% change) with next INR in 2 weeks (change noted, however this is what patient has been taking)    Summary  As of 6/23/2021    Full warfarin instructions:  5 mg every Mon, Wed, Fri; 7.5 mg all other days   Next INR check:  7/8/2021             Telephone call with Owen whom verbalizes understanding and agrees to plan    Check at provider office visit    Education provided: Importance of taking warfarin as instructed -- it would be better to take the doses on the same days of the week instead of alternating days, since every other week you would be taking a different amount of warfarin. A half tablet per week can make the difference between being in or out of your goal range.     Recheck the INR in 2 weeks when you are already at the clinic.     Plan made per [facility] anticoagulation protocol    Shonda Robbins RN  Anticoagulation Clinic  6/23/2021    _______________________________________________________________________     Anticoagulation Episode Summary     Current INR goal:  2.0-3.0   TTR:  58.7 % (9.1 mo)   Target end date:  10/26/2022   Send INR reminders to:  [facility]    Indications    Atrial fibrillation  (H) [I48.91]  Long term (current) use of anticoagulants [Z79.01]  Atrial fibrillation  unspecified type (H) [I48.91]           Comments:           Anticoagulation Care Providers     Provider Role Specialty Phone number    Evelio Oliver MD Referring Cardiovascular Disease [phone]    Braulio Coronel MD Referring Family Medicine [phone]

## 2021-07-08 ENCOUNTER — ANTICOAGULATION THERAPY VISIT (OUTPATIENT)
Dept: NURSING | Facility: CLINIC | Age: 61
End: 2021-07-08

## 2021-07-08 ENCOUNTER — OFFICE VISIT (OUTPATIENT)
Dept: FAMILY MEDICINE | Facility: CLINIC | Age: 61
End: 2021-07-08
Payer: COMMERCIAL

## 2021-07-08 VITALS
HEART RATE: 68 BPM | BODY MASS INDEX: 42.66 KG/M2 | OXYGEN SATURATION: 97 % | SYSTOLIC BLOOD PRESSURE: 148 MMHG | HEIGHT: 72 IN | TEMPERATURE: 96 F | DIASTOLIC BLOOD PRESSURE: 80 MMHG | WEIGHT: 315 LBS

## 2021-07-08 DIAGNOSIS — I48.91 ATRIAL FIBRILLATION, UNSPECIFIED TYPE (H): ICD-10-CM

## 2021-07-08 DIAGNOSIS — Z79.01 LONG TERM (CURRENT) USE OF ANTICOAGULANTS: ICD-10-CM

## 2021-07-08 DIAGNOSIS — Z00.00 ROUTINE GENERAL MEDICAL EXAMINATION AT A HEALTH CARE FACILITY: Primary | ICD-10-CM

## 2021-07-08 LAB
ALT SERPL W P-5'-P-CCNC: 52 U/L (ref 0–70)
ANION GAP SERPL CALCULATED.3IONS-SCNC: 6 MMOL/L (ref 3–14)
BASOPHILS # BLD AUTO: 0 10E9/L (ref 0–0.2)
BASOPHILS NFR BLD AUTO: 0.5 %
BUN SERPL-MCNC: 16 MG/DL (ref 7–30)
CALCIUM SERPL-MCNC: 9 MG/DL (ref 8.5–10.1)
CHLORIDE SERPL-SCNC: 110 MMOL/L (ref 94–109)
CHOLEST SERPL-MCNC: 196 MG/DL
CO2 SERPL-SCNC: 26 MMOL/L (ref 20–32)
CREAT SERPL-MCNC: 1.01 MG/DL (ref 0.66–1.25)
DIFFERENTIAL METHOD BLD: NORMAL
EOSINOPHIL # BLD AUTO: 0.4 10E9/L (ref 0–0.7)
EOSINOPHIL NFR BLD AUTO: 4.4 %
ERYTHROCYTE [DISTWIDTH] IN BLOOD BY AUTOMATED COUNT: 13.5 % (ref 10–15)
GFR SERPL CREATININE-BSD FRML MDRD: 79 ML/MIN/{1.73_M2}
GLUCOSE SERPL-MCNC: 128 MG/DL (ref 70–99)
HCT VFR BLD AUTO: 47.5 % (ref 40–53)
HDLC SERPL-MCNC: 40 MG/DL
HGB BLD-MCNC: 15.2 G/DL (ref 13.3–17.7)
INR PPP: 2.1 (ref 0.86–1.14)
LDLC SERPL CALC-MCNC: 127 MG/DL
LYMPHOCYTES # BLD AUTO: 1.5 10E9/L (ref 0.8–5.3)
LYMPHOCYTES NFR BLD AUTO: 17.7 %
MCH RBC QN AUTO: 29.5 PG (ref 26.5–33)
MCHC RBC AUTO-ENTMCNC: 32 G/DL (ref 31.5–36.5)
MCV RBC AUTO: 92 FL (ref 78–100)
MONOCYTES # BLD AUTO: 0.7 10E9/L (ref 0–1.3)
MONOCYTES NFR BLD AUTO: 7.5 %
NEUTROPHILS # BLD AUTO: 6.1 10E9/L (ref 1.6–8.3)
NEUTROPHILS NFR BLD AUTO: 69.9 %
NONHDLC SERPL-MCNC: 156 MG/DL
PLATELET # BLD AUTO: 248 10E9/L (ref 150–450)
POTASSIUM SERPL-SCNC: 4.7 MMOL/L (ref 3.4–5.3)
PSA SERPL-ACNC: 0.83 UG/L (ref 0–4)
RBC # BLD AUTO: 5.16 10E12/L (ref 4.4–5.9)
SODIUM SERPL-SCNC: 142 MMOL/L (ref 133–144)
TRIGL SERPL-MCNC: 144 MG/DL
WBC # BLD AUTO: 8.7 10E9/L (ref 4–11)

## 2021-07-08 PROCEDURE — 80061 LIPID PANEL: CPT | Performed by: FAMILY MEDICINE

## 2021-07-08 PROCEDURE — 85025 COMPLETE CBC W/AUTO DIFF WBC: CPT | Performed by: FAMILY MEDICINE

## 2021-07-08 PROCEDURE — 99396 PREV VISIT EST AGE 40-64: CPT | Mod: 25 | Performed by: FAMILY MEDICINE

## 2021-07-08 PROCEDURE — 90732 PPSV23 VACC 2 YRS+ SUBQ/IM: CPT | Performed by: FAMILY MEDICINE

## 2021-07-08 PROCEDURE — G0009 ADMIN PNEUMOCOCCAL VACCINE: HCPCS | Performed by: FAMILY MEDICINE

## 2021-07-08 PROCEDURE — 36415 COLL VENOUS BLD VENIPUNCTURE: CPT | Performed by: FAMILY MEDICINE

## 2021-07-08 PROCEDURE — 80048 BASIC METABOLIC PNL TOTAL CA: CPT | Performed by: FAMILY MEDICINE

## 2021-07-08 PROCEDURE — 85610 PROTHROMBIN TIME: CPT | Performed by: FAMILY MEDICINE

## 2021-07-08 PROCEDURE — G0103 PSA SCREENING: HCPCS | Performed by: FAMILY MEDICINE

## 2021-07-08 PROCEDURE — 84460 ALANINE AMINO (ALT) (SGPT): CPT | Performed by: FAMILY MEDICINE

## 2021-07-08 ASSESSMENT — MIFFLIN-ST. JEOR: SCORE: 2380.23

## 2021-07-08 NOTE — PROGRESS NOTES
ANTICOAGULATION MANAGEMENT     Owen Sahni 61 year old male is on warfarin with therapeutic INR result. (Goal INR 2.0-3.0)    Recent labs: (last 7 days)     07/08/21  0945   INR 2.10*       ASSESSMENT     Source(s): Patient/Caregiver Call       Warfarin doses taken: Warfarin taken as instructed    Diet: No new diet changes identified    New illness, injury, or hospitalization: No    Medication/supplement changes: None noted    Signs or symptoms of bleeding or clotting: No    Previous INR: Therapeutic last 2(+) visits    Additional findings: None     PLAN     Recommended plan for no diet, medication or health factor changes affecting INR     Dosing Instructions: Continue your current warfarin dose with next INR in 4 weeks       Summary  As of 7/8/2021    Full warfarin instructions:  5 mg every Mon, Wed, Fri; 7.5 mg all other days   Next INR check:               Telephone call with Owen who verbalizes understanding and agrees to plan and who agrees to plan and repeated back plan correctly    Lab visit scheduled    Education provided: Please call back if any changes to your diet, medications or how you've been taking warfarin, Importance of notifying clinic for changes in medications; a sooner lab recheck maybe needed., Importance of notifying clinic for diarrhea, nausea/vomiting, reduced intake, and/or illness; a sooner lab recheck maybe needed. and Contact [phone]  with any changes, questions or concerns.     Plan made per [facility] anticoagulation protocol    Sushila Velazquez RN  Anticoagulation Clinic  7/8/2021    _______________________________________________________________________     Anticoagulation Episode Summary     Current INR goal:  2.0-3.0   TTR:  58.7 % (9.1 mo)   Target end date:  10/26/2022   Send INR reminders to:  [facility]    Indications    Atrial fibrillation (H) [I48.91]  Long term (current) use of anticoagulants [Z79.01]  Atrial fibrillation  unspecified type (H) [I48.91]            Comments:           Anticoagulation Care Providers     Provider Role Specialty Phone number    Evelio Oliver MD Referring Cardiovascular Disease [phone]    Braulio Coronel MD Referring Family Medicine [phone]

## 2021-07-08 NOTE — LETTER
July 12, 2021      Owen Harperen  [address] [facility] [address]          Josesito Polanco,     The cholesterol is still high. This puts you at risk for a heart attack. I know you don't want to take statin medications. Please let me know if you change your mind.     Your glucose was previously in the prediabetes range. But now it is in the diabetes range at 128 (126 defines diabetes). It is important that you continue your efforts in weight loss via proper diet and regular exercise.     We should recheck your test in 6 months to make sure it is not getting worse. Please make a lab appointment for 6 months.     All other labs were fine.     Regards,     Braulio Coronel M.D.       Resulted Orders   Basic metabolic panel   Result Value Ref Range    Sodium 142 133 - 144 mmol/L    Potassium 4.7 3.4 - 5.3 mmol/L    Chloride 110 (H) 94 - 109 mmol/L    Carbon Dioxide 26 20 - 32 mmol/L    Anion Gap 6 3 - 14 mmol/L    Glucose 128 (H) 70 - 99 mg/dL      Comment:      Fasting specimen    Urea Nitrogen 16 7 - 30 mg/dL    Creatinine 1.01 0.66 - 1.25 mg/dL    GFR Estimate 79 >60 mL/min/[1.73_m2]      Comment:      Non  GFR Calc  Starting 12/18/2018, serum creatinine based estimated GFR (eGFR) will be   calculated using the Chronic Kidney Disease Epidemiology Collaboration   (CKD-EPI) equation.      GFR Estimate If Black >90 >60 mL/min/[1.73_m2]      Comment:       GFR Calc  Starting 12/18/2018, serum creatinine based estimated GFR (eGFR) will be   calculated using the Chronic Kidney Disease Epidemiology Collaboration   (CKD-EPI) equation.      Calcium 9.0 8.5 - 10.1 mg/dL   ALT   Result Value Ref Range    ALT 52 0 - 70 U/L   CBC with platelets and differential   Result Value Ref Range    WBC 8.7 4.0 - 11.0 10e9/L    RBC Count 5.16 4.4 - 5.9 10e12/L    Hemoglobin 15.2 13.3 - 17.7 g/dL    Hematocrit 47.5 40.0 - 53.0 %    MCV 92 78 - 100 fl    MCH 29.5 26.5 - 33.0 pg    MCHC 32.0 31.5 - 36.5 g/dL     RDW 13.5 10.0 - 15.0 %    Platelet Count 248 150 - 450 10e9/L    % Neutrophils 69.9 %    % Lymphocytes 17.7 %    % Monocytes 7.5 %    % Eosinophils 4.4 %    % Basophils 0.5 %    Absolute Neutrophil 6.1 1.6 - 8.3 10e9/L    Absolute Lymphocytes 1.5 0.8 - 5.3 10e9/L    Absolute Monocytes 0.7 0.0 - 1.3 10e9/L    Absolute Eosinophils 0.4 0.0 - 0.7 10e9/L    Absolute Basophils 0.0 0.0 - 0.2 10e9/L    Diff Method Automated Method    Lipid panel reflex to direct LDL Fasting   Result Value Ref Range    Cholesterol 196 <200 mg/dL    Triglycerides 144 <150 mg/dL      Comment:      Fasting specimen    HDL Cholesterol 40 >39 mg/dL    LDL Cholesterol Calculated 127 (H) <100 mg/dL      Comment:      Above desirable:  100-129 mg/dl  Borderline High:  130-159 mg/dL  High:             160-189 mg/dL  Very high:       >189 mg/dl      Non HDL Cholesterol 156 (H) <130 mg/dL      Comment:      Above Desirable:  130-159 mg/dl  Borderline high:  160-189 mg/dl  High:             190-219 mg/dl  Very high:       >219 mg/dl     PSA, screen   Result Value Ref Range    PSA 0.83 0 - 4 ug/L      Comment:      Assay Method:  Chemiluminescence using Siemens Vista analyzer   INR   Result Value Ref Range    INR 2.10 (H) 0.86 - 1.14      Comment:      This test is intended for monitoring Coumadin therapy.  Results are not   accurate in patients with prolonged INR due to factor deficiency.
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
## 2021-07-08 NOTE — PATIENT INSTRUCTIONS
1. Follow a healthy diet - reliable information is available at: myplate.gov.    2. Get regular exercise based on your abilities like walking, jogging, biking, swimming and strength training (150 minutes per week).      3. Avoid the sun or otherwise use sun screen to prevent skin cancer.    4. See the dentist and eye doctor regularly.     5. Advanced directive or living will is a good idea. Fill out the form by going to: https://mn-care-directive.[employer], then have it notarized and send me a copy.     6. Check your weight every morning without clothes after urinating - report if up by 5 # in one week.    7. Check your blood pressure a few times per week - ideally it should be 130/80 or less but if 140/90 or higher persistently, let me know.    8. I will contact you with results. If all is well, see you in one year for a physical with fasting labs.

## 2021-07-12 DIAGNOSIS — R73.03 PREDIABETES: Primary | ICD-10-CM

## 2021-07-12 NOTE — RESULT ENCOUNTER NOTE
Please mail letter:    Josesito Polanco,    The cholesterol is still high. This puts you at risk for a heart attack. I know you don't want to take statin medications. Please let me know if you change your mind.    Your glucose was previously in the prediabetes range. But now it is in the diabetes range at 128 (126 defines diabetes). It is important that you continue your efforts in weight loss via proper diet and regular exercise.    We should recheck your test in 6 months to make sure it is not getting worse. Please make a lab appointment for 6 months.    All other labs were fine.    Regards,    Braulio Coronel M.D.

## 2021-07-30 DIAGNOSIS — I48.91 ATRIAL FIBRILLATION, UNSPECIFIED TYPE (H): Primary | ICD-10-CM

## 2021-08-05 ENCOUNTER — LAB (OUTPATIENT)
Dept: LAB | Facility: CLINIC | Age: 61
End: 2021-08-05
Payer: COMMERCIAL

## 2021-08-05 ENCOUNTER — ANTICOAGULATION THERAPY VISIT (OUTPATIENT)
Dept: ANTICOAGULATION | Facility: CLINIC | Age: 61
End: 2021-08-05

## 2021-08-05 DIAGNOSIS — I48.91 ATRIAL FIBRILLATION, UNSPECIFIED TYPE (H): ICD-10-CM

## 2021-08-05 DIAGNOSIS — Z79.01 LONG TERM (CURRENT) USE OF ANTICOAGULANTS: ICD-10-CM

## 2021-08-05 DIAGNOSIS — I48.91 ATRIAL FIBRILLATION (H): Primary | ICD-10-CM

## 2021-08-05 LAB — INR BLD: 2.3 (ref 0.9–1.1)

## 2021-08-05 PROCEDURE — 85610 PROTHROMBIN TIME: CPT

## 2021-08-05 NOTE — PROGRESS NOTES
Anticoagulation Management    Unable to reach Collin today.    Today's INR result of 2.3 is therapeutic (goal INR of 2.0-3.0).  Result received from: Clinic Lab    Follow up required to confirm warfarin dose taken and assess for changes    left message to call nurse back today  Plan if no factors to continue same warfarin dose and recheck in 4-5 weeks.     Anticoagulation clinic to follow up    Sushila Velazquez RN

## 2021-09-17 ENCOUNTER — TELEPHONE (OUTPATIENT)
Dept: LAB | Facility: CLINIC | Age: 61
End: 2021-09-17

## 2021-09-17 NOTE — TELEPHONE ENCOUNTER
ANTICOAGULATION     Owen Sahni is overdue for INR check.      Spoke with Collin and scheduled lab appointment on 9/23/21    Gricelda Dhillon RN

## 2021-09-27 DIAGNOSIS — I48.91 ATRIAL FIBRILLATION, UNSPECIFIED TYPE (H): ICD-10-CM

## 2021-09-28 ENCOUNTER — TELEPHONE (OUTPATIENT)
Dept: FAMILY MEDICINE | Facility: CLINIC | Age: 61
End: 2021-09-28

## 2021-09-28 RX ORDER — WARFARIN SODIUM 5 MG/1
TABLET ORAL
Qty: 114 TABLET | Refills: 0 | Status: SHIPPED | OUTPATIENT
Start: 2021-09-28 | End: 2021-12-21

## 2021-09-28 NOTE — TELEPHONE ENCOUNTER
ANTICOAGULATION     Owen Sahni is overdue for INR check.      Spoke with Collin and scheduled lab appointment on 10/7//21    Sushila Velazquez RN

## 2021-09-28 NOTE — TELEPHONE ENCOUNTER
Prescription approved per [facility] Refill Protocol. Sushila Velazquez RN

## 2021-10-07 ENCOUNTER — DOCUMENTATION ONLY (OUTPATIENT)
Dept: ANTICOAGULATION | Facility: CLINIC | Age: 61
End: 2021-10-07

## 2021-10-07 ENCOUNTER — ANTICOAGULATION THERAPY VISIT (OUTPATIENT)
Dept: ANTICOAGULATION | Facility: CLINIC | Age: 61
End: 2021-10-07

## 2021-10-07 ENCOUNTER — LAB (OUTPATIENT)
Dept: LAB | Facility: CLINIC | Age: 61
End: 2021-10-07
Payer: COMMERCIAL

## 2021-10-07 DIAGNOSIS — I48.91 ATRIAL FIBRILLATION, UNSPECIFIED TYPE (H): ICD-10-CM

## 2021-10-07 DIAGNOSIS — I48.91 ATRIAL FIBRILLATION (H): Primary | ICD-10-CM

## 2021-10-07 DIAGNOSIS — Z79.01 LONG TERM (CURRENT) USE OF ANTICOAGULANTS: ICD-10-CM

## 2021-10-07 LAB — INR BLD: 3 (ref 0.9–1.1)

## 2021-10-07 PROCEDURE — 36416 COLLJ CAPILLARY BLOOD SPEC: CPT

## 2021-10-07 PROCEDURE — 85610 PROTHROMBIN TIME: CPT

## 2021-10-07 NOTE — PROGRESS NOTES
ANTICOAGULATION MANAGEMENT     Owen Sahni 61 year old male is on warfarin with therapeutic INR result. (Goal INR 2.0-3.0)    Recent labs: (last 7 days)     10/07/21  1007   INR 3.0*       ASSESSMENT     Source(s): Chart Review and Patient/Caregiver Call       Warfarin doses taken: Warfarin taken as instructed    Diet: No new diet changes identified    New illness, injury, or hospitalization: No    Medication/supplement changes: None noted    Signs or symptoms of bleeding or clotting: No    Previous INR: Therapeutic last 2(+) visits    Additional findings: None     PLAN     Recommended plan for no diet, medication or health factor changes affecting INR     Dosing Instructions: Continue your current warfarin dose with next INR in 6 weeks       Summary  As of 10/7/2021    Full warfarin instructions:  5 mg every Mon, Wed, Fri; 7.5 mg all other days   Next INR check:  11/18/2021             Telephone call with Owen who verbalizes understanding and agrees to plan    Lab visit scheduled    Education provided: Please call back if any changes to your diet, medications or how you've been taking warfarin    Plan made per [facility] anticoagulation protocol    Kaylin Braga, RN  Anticoagulation Clinic  10/7/2021    _______________________________________________________________________     Anticoagulation Episode Summary     Current INR goal:  2.0-3.0   TTR:  70.4 % (11.9 mo)   Target end date:  10/26/2022   Send INR reminders to:  [facility]    Indications    Atrial fibrillation (H) [I48.91]  Long term (current) use of anticoagulants [Z79.01]  Atrial fibrillation  unspecified type (H) [I48.91]           Comments:           Anticoagulation Care Providers     Provider Role Specialty Phone number    Evelio Oliver MD Referring Cardiovascular Disease [phone]    Braulio Coronel MD Referring Family Medicine [phone]

## 2021-10-07 NOTE — PROGRESS NOTES
ANTICOAGULATION MANAGEMENT      Owen Sahni due for annual renewal of referral to anticoagulation monitoring. Order pended for your review and signature.      ANTICOAGULATION SUMMARY      Warfarin indication(s)     Atrial fibrillation    Heart valve present?  NO       Current goal range   INR: 2.0-3.0     Goal appropriate for indication? Yes, INR 2-3 appropriate for hx of DVT, PE, hypercoagulable state, Afib, LVAD, or bileaflet AVR without risk factors     Current duration of therapy 10/26/2022   Time in Therapeutic Range (TTR)  (Goal > 60%) 70%       Office visit with referring provider's group within last year yes on 7/8/21       Kaylin Braga, SANDEEP

## 2021-12-10 ENCOUNTER — ANTICOAGULATION THERAPY VISIT (OUTPATIENT)
Dept: ANTICOAGULATION | Facility: CLINIC | Age: 61
End: 2021-12-10

## 2021-12-10 ENCOUNTER — LAB (OUTPATIENT)
Dept: LAB | Facility: CLINIC | Age: 61
End: 2021-12-10
Payer: COMMERCIAL

## 2021-12-10 DIAGNOSIS — I48.91 ATRIAL FIBRILLATION, UNSPECIFIED TYPE (H): ICD-10-CM

## 2021-12-10 DIAGNOSIS — Z79.01 LONG TERM (CURRENT) USE OF ANTICOAGULANTS: ICD-10-CM

## 2021-12-10 DIAGNOSIS — I48.91 ATRIAL FIBRILLATION (H): Primary | ICD-10-CM

## 2021-12-10 LAB — INR BLD: 2.1 (ref 0.9–1.1)

## 2021-12-10 PROCEDURE — 36416 COLLJ CAPILLARY BLOOD SPEC: CPT

## 2021-12-10 PROCEDURE — 85610 PROTHROMBIN TIME: CPT

## 2021-12-10 NOTE — PROGRESS NOTES
"ANTICOAGULATION MANAGEMENT     Owen Sahni 61 year old male is on warfarin with therapeutic INR result. (Goal INR 2.0-3.0)    Recent labs: (last 7 days)     12/10/21  0931   INR 2.1*       ASSESSMENT     Source(s): Chart Review and Patient/Caregiver Call       Warfarin doses taken: Warfarin taken as instructed    Diet: No new diet changes identified    New illness, injury, or hospitalization: No    Medication/supplement changes: None noted    Signs or symptoms of bleeding or clotting: No    Previous INR: Therapeutic last 2(+) visits    Additional findings: None states \"feeling very well and working hard\"     PLAN     Recommended plan for no diet, medication or health factor changes affecting INR     Dosing Instructions: Continue your current warfarin dose with next INR in 6 weeks       Summary  As of 12/10/2021    Full warfarin instructions:  5 mg every Mon, Wed, Fri; 7.5 mg all other days   Next INR check:  1/20/2022             Telephone call with Owen who verbalizes understanding and agrees to plan    Lab visit scheduled    Education provided: Please call back if any changes to your diet, medications or how you've been taking warfarin and Contact [phone]  with any changes, questions or concerns.     Plan made per [facility] anticoagulation protocol    Sushila Velazquez RN  Anticoagulation Clinic  12/10/2021    _______________________________________________________________________     Anticoagulation Episode Summary     Current INR goal:  2.0-3.0   TTR:  71.0 % (1 y)   Target end date:  10/26/2022   Send INR reminders to:  [facility]    Indications    Atrial fibrillation (H) [I48.91]  Long term (current) use of anticoagulants [Z79.01]  Atrial fibrillation  unspecified type (H) [I48.91]           Comments:           Anticoagulation Care Providers     Provider Role Specialty Phone number    Evelio Oliver MD Referring Cardiovascular Disease [phone]    Braulio Coronel MD Referring Family Medicine " [phone]

## 2021-12-21 DIAGNOSIS — I48.91 ATRIAL FIBRILLATION, UNSPECIFIED TYPE (H): ICD-10-CM

## 2021-12-21 RX ORDER — WARFARIN SODIUM 5 MG/1
TABLET ORAL
Qty: 114 TABLET | Refills: 1 | Status: SHIPPED | OUTPATIENT
Start: 2021-12-21 | End: 2022-06-24

## 2022-01-12 ENCOUNTER — ALLIED HEALTH/NURSE VISIT (OUTPATIENT)
Dept: FAMILY MEDICINE | Facility: CLINIC | Age: 62
End: 2022-01-12
Payer: COMMERCIAL

## 2022-01-12 VITALS — SYSTOLIC BLOOD PRESSURE: 174 MMHG | HEART RATE: 64 BPM | DIASTOLIC BLOOD PRESSURE: 112 MMHG

## 2022-01-12 DIAGNOSIS — Z01.30 BLOOD PRESSURE CHECK: Primary | ICD-10-CM

## 2022-01-12 PROCEDURE — 99207 PR NO CHARGE LOS: CPT | Performed by: FAMILY MEDICINE

## 2022-01-12 NOTE — Clinical Note
Routing message to PCP for review because BP checked at pharmacy is above goal. Recommended patient to follow-up with PCP.  PCP please close this encounter.   Albaro Garcia RPh.  [facility]  [phone]

## 2022-01-12 NOTE — PROGRESS NOTES
Owen Sahni was evaluated at [facility] on January 12, 2022 at which time his blood pressure was:    BP Readings from Last 3 Encounters:   01/12/22 (!) 174/112   07/08/21 (!) 148/80   06/03/21 (!) 142/90     Pulse Readings from Last 3 Encounters:   01/12/22 64   07/08/21 68   06/03/21 73       Reviewed lifestyle modifications for blood pressure control and reduction: including making healthy food choices, managing weight, getting regular exercise, smoking cessation, reducing alcohol consumption, monitoring blood pressure regularly.     Symptoms: None    BP Goal:< 140/90 mmHg    BP Assessment:  BP too high    Potential Reasons for BP too high: Unknown/Other: Uknown    BP Follow-Up Plan: Recheck BP in 30 days at pharmacy    Recommendation to Provider: Review Meds    Note completed by: Albaro Garcia RPh.  [facility]  [phone]

## 2022-01-27 ENCOUNTER — OFFICE VISIT (OUTPATIENT)
Dept: FAMILY MEDICINE | Facility: CLINIC | Age: 62
End: 2022-01-27
Payer: COMMERCIAL

## 2022-01-27 ENCOUNTER — ANTICOAGULATION THERAPY VISIT (OUTPATIENT)
Dept: ANTICOAGULATION | Facility: CLINIC | Age: 62
End: 2022-01-27

## 2022-01-27 ENCOUNTER — DOCUMENTATION ONLY (OUTPATIENT)
Dept: ANTICOAGULATION | Facility: CLINIC | Age: 62
End: 2022-01-27

## 2022-01-27 VITALS
WEIGHT: 315 LBS | BODY MASS INDEX: 42.66 KG/M2 | DIASTOLIC BLOOD PRESSURE: 74 MMHG | HEIGHT: 72 IN | TEMPERATURE: 97.1 F | OXYGEN SATURATION: 95 % | HEART RATE: 75 BPM | SYSTOLIC BLOOD PRESSURE: 160 MMHG

## 2022-01-27 DIAGNOSIS — J40 BRONCHITIS: Primary | ICD-10-CM

## 2022-01-27 DIAGNOSIS — R05.9 COUGH: ICD-10-CM

## 2022-01-27 DIAGNOSIS — I48.91 ATRIAL FIBRILLATION, UNSPECIFIED TYPE (H): ICD-10-CM

## 2022-01-27 DIAGNOSIS — I48.91 ATRIAL FIBRILLATION (H): Primary | ICD-10-CM

## 2022-01-27 DIAGNOSIS — Z79.01 LONG TERM (CURRENT) USE OF ANTICOAGULANTS: ICD-10-CM

## 2022-01-27 LAB
ALBUMIN SERPL-MCNC: 3.4 G/DL (ref 3.4–5)
ALP SERPL-CCNC: 55 U/L (ref 40–150)
ALT SERPL W P-5'-P-CCNC: 47 U/L (ref 0–70)
ANION GAP SERPL CALCULATED.3IONS-SCNC: 8 MMOL/L (ref 3–14)
AST SERPL W P-5'-P-CCNC: 39 U/L (ref 0–45)
BASOPHILS # BLD AUTO: 0 10E3/UL (ref 0–0.2)
BASOPHILS NFR BLD AUTO: 0 %
BILIRUB SERPL-MCNC: 0.9 MG/DL (ref 0.2–1.3)
BUN SERPL-MCNC: 15 MG/DL (ref 7–30)
CALCIUM SERPL-MCNC: 8.9 MG/DL (ref 8.5–10.1)
CHLORIDE BLD-SCNC: 104 MMOL/L (ref 94–109)
CO2 SERPL-SCNC: 25 MMOL/L (ref 20–32)
CREAT SERPL-MCNC: 1.2 MG/DL (ref 0.66–1.25)
EOSINOPHIL # BLD AUTO: 0.2 10E3/UL (ref 0–0.7)
EOSINOPHIL NFR BLD AUTO: 3 %
ERYTHROCYTE [DISTWIDTH] IN BLOOD BY AUTOMATED COUNT: 14.4 % (ref 10–15)
GFR SERPL CREATININE-BSD FRML MDRD: 68 ML/MIN/1.73M2
GLUCOSE BLD-MCNC: 101 MG/DL (ref 70–99)
HCT VFR BLD AUTO: 46.3 % (ref 40–53)
HGB BLD-MCNC: 15.3 G/DL (ref 13.3–17.7)
INR PPP: 1.72 (ref 0.85–1.15)
LYMPHOCYTES # BLD AUTO: 0.8 10E3/UL (ref 0.8–5.3)
LYMPHOCYTES NFR BLD AUTO: 15 %
MCH RBC QN AUTO: 30.1 PG (ref 26.5–33)
MCHC RBC AUTO-ENTMCNC: 33 G/DL (ref 31.5–36.5)
MCV RBC AUTO: 91 FL (ref 78–100)
MONOCYTES # BLD AUTO: 0.7 10E3/UL (ref 0–1.3)
MONOCYTES NFR BLD AUTO: 12 %
NEUTROPHILS # BLD AUTO: 4.1 10E3/UL (ref 1.6–8.3)
NEUTROPHILS NFR BLD AUTO: 71 %
PLATELET # BLD AUTO: 183 10E3/UL (ref 150–450)
POTASSIUM BLD-SCNC: 4 MMOL/L (ref 3.4–5.3)
PROCALCITONIN SERPL-MCNC: 0.05 NG/ML
PROT SERPL-MCNC: 8 G/DL (ref 6.8–8.8)
RBC # BLD AUTO: 5.09 10E6/UL (ref 4.4–5.9)
SODIUM SERPL-SCNC: 137 MMOL/L (ref 133–144)
WBC # BLD AUTO: 5.7 10E3/UL (ref 4–11)

## 2022-01-27 PROCEDURE — 80053 COMPREHEN METABOLIC PANEL: CPT | Performed by: FAMILY MEDICINE

## 2022-01-27 PROCEDURE — U0003 INFECTIOUS AGENT DETECTION BY NUCLEIC ACID (DNA OR RNA); SEVERE ACUTE RESPIRATORY SYNDROME CORONAVIRUS 2 (SARS-COV-2) (CORONAVIRUS DISEASE [COVID-19]), AMPLIFIED PROBE TECHNIQUE, MAKING USE OF HIGH THROUGHPUT TECHNOLOGIES AS DESCRIBED BY CMS-2020-01-R: HCPCS | Mod: 90 | Performed by: FAMILY MEDICINE

## 2022-01-27 PROCEDURE — 99214 OFFICE O/P EST MOD 30 MIN: CPT | Mod: 25 | Performed by: FAMILY MEDICINE

## 2022-01-27 PROCEDURE — 85025 COMPLETE CBC W/AUTO DIFF WBC: CPT | Performed by: FAMILY MEDICINE

## 2022-01-27 PROCEDURE — 96372 THER/PROPH/DIAG INJ SC/IM: CPT | Performed by: FAMILY MEDICINE

## 2022-01-27 PROCEDURE — 84145 PROCALCITONIN (PCT): CPT | Performed by: FAMILY MEDICINE

## 2022-01-27 PROCEDURE — 36415 COLL VENOUS BLD VENIPUNCTURE: CPT | Performed by: FAMILY MEDICINE

## 2022-01-27 PROCEDURE — U0005 INFEC AGEN DETEC AMPLI PROBE: HCPCS | Mod: 90 | Performed by: FAMILY MEDICINE

## 2022-01-27 PROCEDURE — 99000 SPECIMEN HANDLING OFFICE-LAB: CPT | Performed by: FAMILY MEDICINE

## 2022-01-27 PROCEDURE — 85610 PROTHROMBIN TIME: CPT | Performed by: FAMILY MEDICINE

## 2022-01-27 RX ORDER — AZITHROMYCIN 250 MG/1
TABLET, FILM COATED ORAL
Qty: 6 TABLET | Refills: 0 | Status: SHIPPED | OUTPATIENT
Start: 2022-01-27 | End: 2022-02-01

## 2022-01-27 RX ORDER — BENZONATATE 200 MG/1
200 CAPSULE ORAL 3 TIMES DAILY PRN
Qty: 20 CAPSULE | Refills: 0 | Status: SHIPPED | OUTPATIENT
Start: 2022-01-27 | End: 2022-09-12

## 2022-01-27 RX ORDER — CEFTRIAXONE SODIUM 1 G
1 VIAL (EA) INJECTION ONCE
Status: COMPLETED | OUTPATIENT
Start: 2022-01-27 | End: 2022-01-27

## 2022-01-27 RX ADMIN — Medication 1 G: at 09:37

## 2022-01-27 ASSESSMENT — PAIN SCALES - GENERAL: PAINLEVEL: NO PAIN (0)

## 2022-01-27 ASSESSMENT — MIFFLIN-ST. JEOR: SCORE: 2298.58

## 2022-01-27 NOTE — PROGRESS NOTES
Assessment & Plan     Bronchitis  Patient is here for concern of productive cough of white sputum and bloody in the last 4 days   Blood is mixed with the phlegm.   Had pneumonia a year ago.   No fever   Had couple night chills   He feels weak   Exam showed lungs clear to auscultation.  There is a left supraclavicular lymph node that is tender.  This could be reactive versus malignancy.  He will return 1 to 2 weeks for follow-up and possibly obtain CT neck soft tissue or chest CT scan.  He had pneumonia in the past.  His current symptoms could be due to bronchitis versus early pneumonia.  Patient was given ceftriaxone 1 g injection in the clinic.  We will start him on Augmentin and azithromycin to cover for community-acquired pneumonia.  We will also obtain Covid test.  He declined going to the ED today for evaluation of hemoptysis, Red flags and warning signs discussed with patient that would mandate going to the ED.  Advised the patient to call 911 or go to the ED if he develops any of theses symptoms  - CBC with platelets and differential; Future  - Procalcitonin; Future  - Comprehensive metabolic panel (BMP + Alb, Alk Phos, ALT, AST, Total. Bili, TP); Future  - cefTRIAXone (ROCEPHIN) in lidocaine 1% (PF) injection 1 g  - amoxicillin-clavulanate (AUGMENTIN) 875-125 MG tablet; Take 1 tablet by mouth 2 times daily  - azithromycin (ZITHROMAX) 250 MG tablet; Take 2 tablets (500 mg) by mouth daily for 1 day, THEN 1 tablet (250 mg) daily for 4 days.  - Symptomatic; Unknown COVID-19 Virus (Coronavirus) by PCR Nose  - benzonatate (TESSALON) 200 MG capsule; Take 1 capsule (200 mg) by mouth 3 times daily as needed for cough  - Comprehensive metabolic panel (BMP + Alb, Alk Phos, ALT, AST, Total. Bili, TP)  - Procalcitonin  - CBC with platelets and differential  Instructions regarding  diagnosis, treatment options and plan of care reviewed with the patient.  Written instructions provided in after visit summary and  reviewed.  Patient instructed to return to the clinic for new or persistent symptoms.   Red flag symptoms and warning signes reviewed and patient has been instructed to go to the Ed ot call 911 if experience any of these   Please contact pharmacy for medication questions.  Patient instructed to take medications as directed on package.    Continue other medications as previously prescribed.  Patient verbalized understanding and agreed on the plan of care. All questions answered.     Cough  Symptomatic treatment with Tessalon  - benzonatate (TESSALON) 200 MG capsule; Take 1 capsule (200 mg) by mouth 3 times daily as needed for cough    Atrial fibrillation, unspecified type (H)  Stable.  No current symptoms  On warfarin for stroke prevention, follows with INR clinic  Continue Toprol-XL for rate control  - INR; Future  - INR      32 minutes spent on the date of the encounter doing chart review, history and exam, documentation and further activities per the note       BMI:   Estimated body mass index is 43.67 kg/m  as calculated from the following:    Height as of this encounter: 1.829 m (6').    Weight as of this encounter: 146.1 kg (322 lb).           Return in about 1 week (around 2/3/2022).    Natasha Galindo MD  [facility] ZHOU Polanco is a 61 year old who presents for the following health issues     History of Present Illness       He eats 0-1 servings of fruits and vegetables daily.He consumes 0 sweetened beverage(s) daily.He exercises with enough effort to increase his heart rate 10 to 19 minutes per day.  He exercises with enough effort to increase his heart rate 4 days per week.   He is taking medications regularly.       Concern - Pneumonia  Onset: 3 days  Description: spitting up white mucus & blood   Intensity: moderate  Progression of Symptoms:  worsening  Accompanying Signs & Symptoms: hacking cough  Previous history of similar problem: yes last winter  Precipitating factors:         Worsened by: cold weather  Alleviating factors:        Improved by: nothing  Therapies tried and outcome:  none       Patient is here for concern of productive cough of white sputum and bloody in the last 4 days   Blood is mixed with the phlegm.   Had pneumonia a year ago.   No fever   Had couple night chills   He feels weak         Review of Systems   Constitutional, HEENT, cardiovascular, pulmonary, gi and gu systems are negative, except as otherwise noted.      Objective    BP (!) 160/74 (BP Location: Left arm, Patient Position: Sitting, Cuff Size: Adult Large)   Pulse 75   Temp 97.1  F (36.2  C) (Tympanic)   Ht 1.829 m (6')   Wt 146.1 kg (322 lb)   SpO2 95%   BMI 43.67 kg/m    Body mass index is 43.67 kg/m .  Physical Exam  Vitals and nursing note reviewed.   Constitutional:       General: He is not in acute distress.     Appearance: Normal appearance. He is obese. He is not ill-appearing, toxic-appearing or diaphoretic.   HENT:      Head: Normocephalic and atraumatic.   Neck:     Cardiovascular:      Rate and Rhythm: Normal rate and regular rhythm.      Pulses: Normal pulses.      Heart sounds: Normal heart sounds. No murmur heard.  No friction rub. No gallop.    Pulmonary:      Effort: Pulmonary effort is normal. No respiratory distress.      Breath sounds: Normal breath sounds. No stridor. No wheezing, rhonchi or rales.   Chest:      Chest wall: No tenderness.   Breasts:      Right: No supraclavicular adenopathy.      Left: Supraclavicular adenopathy present.       Lymphadenopathy:      Cervical: Cervical adenopathy present.      Upper Body:      Right upper body: No supraclavicular adenopathy.      Left upper body: Supraclavicular adenopathy present.   Skin:     Capillary Refill: Capillary refill takes less than 2 seconds.

## 2022-01-27 NOTE — NURSING NOTE
Clinic Administered Medication Documentation    Administrations This Visit     cefTRIAXone (ROCEPHIN) in lidocaine 1% (PF) injection 1 g     Admin Date  01/27/2022 Action  Given Dose  1 g Route  Intramuscular Site  Right Gluteus Kareem Administered By  Darlene Serrato CMA    Ordering Provider: Natasha Galindo MD    Patient Supplied?: No                  Injectable Medication Documentation    Patient was given Ceftriaxone Sodium (Rocephin). Prior to medication administration, verified patients identity using patient s name and date of birth. Please see MAR and medication order for additional information. Patient instructed to remain in clinic for 15 minutes.      Was entire vial of medication used? Yes  Vial/Syringe: Single dose vial  Expiration Date:  07/2024  Was this medication supplied by the patient? No     Darlene Serrato CMA

## 2022-01-27 NOTE — PROGRESS NOTES
ANTICOAGULATION MANAGEMENT     Owen Sahni 61 year old male is on warfarin with subtherapeutic INR result. (Goal INR 2.0-3.0)    Recent labs: (last 7 days)     01/27/22  0942   INR 1.72*       ASSESSMENT     Source(s): Chart Review       Warfarin doses taken: Warfarin taken as instructed    Diet: No new diet changes identified    New illness, injury, or hospitalization: Yes: in clinic today for pnemonia    Medication/supplement changes: had shot of ceftriaxone in clinic will start z pack and agumentin today    Signs or symptoms of bleeding or clotting: No    Previous INR: Therapeutic last 2(+) visits    Additional findings: None     PLAN     Recommended plan for no diet, medication or health factor changes affecting INR     Dosing Instructions: Continue your current warfarin dose with next INR in 1 week     Did not increase dose due to ABX three different kinds expecting inr to raise.  Summary  As of 1/27/2022    Full warfarin instructions:  5 mg every Mon, Wed, Fri; 7.5 mg all other days   Next INR check:               Telephone call with Owen who verbalizes understanding and agrees to plan    Lab visit scheduled    Education provided: Please call back if any changes to your diet, medications or how you've been taking warfarin, Monitoring for bleeding signs and symptoms and Monitoring for clotting signs and symptoms    Plan made per [facility] anticoagulation protocol    Danielle eMza RN  Anticoagulation Clinic  1/27/2022    _______________________________________________________________________     Anticoagulation Episode Summary     Current INR goal:  2.0-3.0   TTR:  61.3 % (1 y)   Target end date:  10/26/2022   Send INR reminders to:  [facility]    Indications    Atrial fibrillation (H) [I48.91]  Long term (current) use of anticoagulants [Z79.01]  Atrial fibrillation  unspecified type (H) [I48.91]           Comments:           Anticoagulation Care Providers     Provider Role Specialty Phone number     Evelio Oliver MD Referring Cardiovascular Disease [phone]    Braulio Coronel MD Referring Family Medicine [phone]

## 2022-01-27 NOTE — PROGRESS NOTES
ANTICOAGULATION  MANAGEMENT     Interacting Medication Review    Interacting medication(s): Azithromycin (Zithromax, Z-joss) and agumentin and ceftriaxone with warfarin.    Duration: 5 days    Indication: Pneumonia    New medication?: Yes, interaction may increase INR and risk of bleeding       PLAN     Continue current warfarin dose. Recommend to check INR on next week    Spoke with Owen    Anticoagulation Calendar updated    Danielle Meza RN

## 2022-01-27 NOTE — PATIENT INSTRUCTIONS
1. Ceftriaxone injection today     2. Start Zpack and Augmentin       3. Call INR clinic to let them know about meds    4. COVID test today     5.labs     6. return in 7 days for follow up , you need CT scan for concern of left supraclavicular lymph node enlargement

## 2022-01-28 LAB — SARS-COV-2 RNA RESP QL NAA+PROBE: NOT DETECTED

## 2022-02-01 ENCOUNTER — TELEPHONE (OUTPATIENT)
Dept: FAMILY MEDICINE | Facility: CLINIC | Age: 62
End: 2022-02-01
Payer: COMMERCIAL

## 2022-02-01 NOTE — TELEPHONE ENCOUNTER

## 2022-02-11 ENCOUNTER — TELEPHONE (OUTPATIENT)
Dept: FAMILY MEDICINE | Facility: CLINIC | Age: 62
End: 2022-02-11
Payer: COMMERCIAL

## 2022-02-11 NOTE — TELEPHONE ENCOUNTER
ANTICOAGULATION     Owen Sahni is overdue for INR check.      Spoke with Collin and scheduled lab appointment on 2/16/22 this is [employer] available that works for you    Sushila Velazquez RN

## 2022-02-16 ENCOUNTER — LAB (OUTPATIENT)
Dept: LAB | Facility: CLINIC | Age: 62
End: 2022-02-16
Payer: COMMERCIAL

## 2022-02-16 ENCOUNTER — ANTICOAGULATION THERAPY VISIT (OUTPATIENT)
Dept: ANTICOAGULATION | Facility: CLINIC | Age: 62
End: 2022-02-16

## 2022-02-16 DIAGNOSIS — Z79.01 LONG TERM (CURRENT) USE OF ANTICOAGULANTS: ICD-10-CM

## 2022-02-16 DIAGNOSIS — I48.91 ATRIAL FIBRILLATION (H): Primary | ICD-10-CM

## 2022-02-16 DIAGNOSIS — I48.91 ATRIAL FIBRILLATION, UNSPECIFIED TYPE (H): ICD-10-CM

## 2022-02-16 LAB — INR BLD: 2.5 (ref 0.9–1.1)

## 2022-02-16 PROCEDURE — 85610 PROTHROMBIN TIME: CPT

## 2022-02-16 PROCEDURE — 36416 COLLJ CAPILLARY BLOOD SPEC: CPT

## 2022-02-16 NOTE — PROGRESS NOTES
Anticoagulation Management    Unable to reach patient today.      Left message to continue current dose of warfarin 5 mg tonight. Request call back for assessment.      Anticoagulation clinic to follow up    Ruthy Barrow RN

## 2022-02-16 NOTE — PROGRESS NOTES
Anticoagulation Management    Unable to reach patient today.    Today's INR result of 2.5 is therapeutic (goal INR of 2.0-3.0).  Result received from: Clinic Lab    Follow up required to confirm warfarin dose taken and assess for changes    [facility]      Anticoagulation clinic to follow up    Ruthy Barrow RN

## 2022-02-16 NOTE — PROGRESS NOTES
ANTICOAGULATION MANAGEMENT     Owen Sahni 61 year old male is on warfarin with therapeutic INR result. (Goal INR 2.0-3.0)    Recent labs: (last 7 days)     02/16/22  0923   INR 2.5*       ASSESSMENT     Source(s): Chart Review and Patient/Caregiver Call       Warfarin doses taken: Warfarin taken as instructed    Diet: No new diet changes identified    New illness, injury, or hospitalization: No    Medication/supplement changes: None noted    Signs or symptoms of bleeding or clotting: No    Previous INR: Therapeutic last 2(+) visits    Additional findings: None     PLAN     Recommended plan for no diet, medication or health factor changes affecting INR     Dosing Instructions: Continue your current warfarin dose with next INR in 4 weeks       Summary  As of 2/16/2022    Full warfarin instructions:  5 mg every Mon, Wed, Fri; 7.5 mg all other days   Next INR check:  3/16/2022             Telephone call with Owen who verbalizes understanding and agrees to plan    Lab visit scheduled    Education provided: Goal range and significance of current result    Plan made per [facility] anticoagulation protocol    Ruthy Barrow RN  Anticoagulation Clinic  2/16/2022    _______________________________________________________________________     Anticoagulation Episode Summary     Current INR goal:  2.0-3.0   TTR:  63.7 % (1 y)   Target end date:  10/26/2022   Send INR reminders to:  [facility]    Indications    Atrial fibrillation (H) [I48.91]  Long term (current) use of anticoagulants [Z79.01]  Atrial fibrillation  unspecified type (H) [I48.91]           Comments:           Anticoagulation Care Providers     Provider Role Specialty Phone number    Evelio Oliver MD Referring Cardiovascular Disease [phone]    Braulio Coronel MD Referring Family Medicine [phone]

## 2022-03-16 ENCOUNTER — LAB (OUTPATIENT)
Dept: LAB | Facility: CLINIC | Age: 62
End: 2022-03-16
Payer: COMMERCIAL

## 2022-03-16 ENCOUNTER — ANTICOAGULATION THERAPY VISIT (OUTPATIENT)
Dept: ANTICOAGULATION | Facility: CLINIC | Age: 62
End: 2022-03-16

## 2022-03-16 DIAGNOSIS — Z79.01 LONG TERM (CURRENT) USE OF ANTICOAGULANTS: ICD-10-CM

## 2022-03-16 DIAGNOSIS — I48.91 ATRIAL FIBRILLATION, UNSPECIFIED TYPE (H): ICD-10-CM

## 2022-03-16 DIAGNOSIS — I48.91 ATRIAL FIBRILLATION (H): Primary | ICD-10-CM

## 2022-03-16 LAB — INR BLD: 2.6 (ref 0.9–1.1)

## 2022-03-16 PROCEDURE — 36416 COLLJ CAPILLARY BLOOD SPEC: CPT

## 2022-03-16 PROCEDURE — 85610 PROTHROMBIN TIME: CPT

## 2022-03-16 NOTE — PROGRESS NOTES
Pt returning call to discuss INR results. Please contact to discuss and advise.    Marissa Sandoval on 3/16/2022 at 10:59 AM

## 2022-03-16 NOTE — PROGRESS NOTES
ANTICOAGULATION MANAGEMENT     Owen Sahni 61 year old male is on warfarin with therapeutic INR result. (Goal INR 2.0-3.0)    Recent labs: (last 7 days)     03/16/22  0918   INR 2.6*       ASSESSMENT       Source(s): Chart Review and Patient/Caregiver Call       Warfarin doses taken: Warfarin taken as instructed    Diet: No new diet changes identified    New illness, injury, or hospitalization: No    Medication/supplement changes: None noted    Signs or symptoms of bleeding or clotting: No    Previous INR: Therapeutic last visit; previously outside of goal range    Additional findings: None       PLAN     Recommended plan for no diet, medication or health factor changes affecting INR     Dosing Instructions: Continue your current warfarin dose with next INR in 4 weeks       Summary  As of 3/16/2022    Full warfarin instructions:  5 mg every Mon, Wed, Fri; 7.5 mg all other days   Next INR check:  4/13/2022             Telephone call with Owen who verbalizes understanding and agrees to plan    Lab visit scheduled    Education provided: Please call back if any changes to your diet, medications or how you've been taking warfarin and Contact [phone]  with any changes, questions or concerns.     Plan made per [facility] anticoagulation protocol    Sushila Velazquez, RN  Anticoagulation Clinic  3/16/2022    _______________________________________________________________________     Anticoagulation Episode Summary     Current INR goal:  2.0-3.0   TTR:  71.4 % (1 y)   Target end date:  10/26/2022   Send INR reminders to:  [facility]    Indications    Atrial fibrillation (H) [I48.91]  Long term (current) use of anticoagulants [Z79.01]  Atrial fibrillation  unspecified type (H) [I48.91]           Comments:           Anticoagulation Care Providers     Provider Role Specialty Phone number    Evelio Oliver MD Referring Cardiovascular Disease [phone]    Braulio Coronel MD Referring Family Medicine [phone]

## 2022-04-19 ENCOUNTER — LAB (OUTPATIENT)
Dept: LAB | Facility: CLINIC | Age: 62
End: 2022-04-19
Payer: COMMERCIAL

## 2022-04-19 ENCOUNTER — ANTICOAGULATION THERAPY VISIT (OUTPATIENT)
Dept: ANTICOAGULATION | Facility: CLINIC | Age: 62
End: 2022-04-19

## 2022-04-19 DIAGNOSIS — Z79.01 LONG TERM (CURRENT) USE OF ANTICOAGULANTS: ICD-10-CM

## 2022-04-19 DIAGNOSIS — I48.91 ATRIAL FIBRILLATION (H): Primary | ICD-10-CM

## 2022-04-19 DIAGNOSIS — I48.91 ATRIAL FIBRILLATION, UNSPECIFIED TYPE (H): ICD-10-CM

## 2022-04-19 LAB — INR BLD: 3.2 (ref 0.9–1.1)

## 2022-04-19 PROCEDURE — 85610 PROTHROMBIN TIME: CPT

## 2022-04-19 PROCEDURE — 36415 COLL VENOUS BLD VENIPUNCTURE: CPT

## 2022-04-19 NOTE — PROGRESS NOTES
ANTICOAGULATION MANAGEMENT     Owen Sahni 61 year old male is on warfarin with supratherapeutic INR result. (Goal INR 2.0-3.0)    Recent labs: (last 7 days)     04/19/22  1457   INR 3.2*       ASSESSMENT       Source(s): Chart Review and Patient/Caregiver Call       Warfarin doses taken: Warfarin taken as instructed    Diet: No new diet changes identified    New illness, injury, or hospitalization: Yes: foot pain x 2 days, is better now     Medication/supplement changes: Ibuprofen as needed use which has potential for interaction; increasing INR     Signs or symptoms of bleeding or clotting: No    Previous INR: Therapeutic last 2(+) visits    Additional findings: None       PLAN     Recommended plan for temporary change(s) affecting INR     Dosing Instructions: partial hold then continue your current warfarin dose with next INR in 2 weeks       Summary  As of 4/19/2022    Full warfarin instructions:  4/19: 5 mg; Otherwise 5 mg every Mon, Wed, Fri; 7.5 mg all other days   Next INR check:  5/3/2022             Telephone call with Collin who verbalizes understanding and agrees to plan    Patient offered & declined to schedule next visit    Education provided: Goal range and significance of current result, Monitoring for bleeding signs and symptoms and Monitoring for clotting signs and symptoms    Plan made per [facility] anticoagulation protocol    Ene Braga RN  Anticoagulation Clinic  4/19/2022    _______________________________________________________________________     Anticoagulation Episode Summary     Current INR goal:  2.0-3.0   TTR:  77.6 % (1 y)   Target end date:  10/26/2022   Send INR reminders to:  [facility]    Indications    Atrial fibrillation (H) [I48.91]  Long term (current) use of anticoagulants [Z79.01]  Atrial fibrillation  unspecified type (H) [I48.91]           Comments:           Anticoagulation Care Providers     Provider Role Specialty Phone number    Evelio Oliver MD Referring  Cardiovascular Disease [phone]    Braulio Coronel MD [facility] [phone]

## 2022-04-20 ENCOUNTER — TELEPHONE (OUTPATIENT)
Dept: FAMILY MEDICINE | Facility: CLINIC | Age: 62
End: 2022-04-20
Payer: COMMERCIAL

## 2022-04-20 NOTE — TELEPHONE ENCOUNTER
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 I am helping to handle his messages/results while he is gone.     Yes, Eliquis is an option to use for Afib.      Would need to stop warfarin and monitor INR, start eliquis when INR is <2.  Pt should work with Anticoagulation clinic for this and if desires to change I will send the prescription.

## 2022-04-20 NOTE — TELEPHONE ENCOUNTER
Patient states that he Eliquis would be covered by his insurance for a $10 copay monthly.  He is asking if this would be an option over the warfarin and if PCP would order?  Will route to PCP to advise.  Does patient need an appointment to discuss?    Ene Braga RN  [facility]

## 2022-05-03 ENCOUNTER — TELEPHONE (OUTPATIENT)
Dept: FAMILY MEDICINE | Facility: CLINIC | Age: 62
End: 2022-05-03
Payer: COMMERCIAL

## 2022-05-03 NOTE — LETTER
May 3, 2022      Owen Sahni  [address] [address] [address]        Dear Owen,     May 3, 2022      Owen Sahni  [address] [address]      Your healthcare team cares about your health. To provide you with the best care,   we have reviewed your chart and based on our findings, we see that you are due to:     - HYPERTENSION FOLLOW UP: Office Visit  - OTHER FOLLOW UP:  annual physical after 7/7/22    If you have already completed these items, please contact the clinic via phone or   [employer] so your care team can review and update your records. Thank you for   choosing [facility] for your healthcare needs. For any questions,   concerns, or to schedule an appointment please contact the clinic.       Healthy Regards,      Your [facility] Care Team                Sincerely,        MARIO SHEETS MD

## 2022-05-03 NOTE — TELEPHONE ENCOUNTER
Patient Quality Outreach    Patient is due for the following:   Hypertension -  Hypertension follow-up visit  Physical  - Due after 7/7/22    NEXT STEPS:   Schedule a yearly physical    Type of outreach:    Sent letter.      Questions for provider review:    None     Anjelica Dejesus MA

## 2022-05-16 ENCOUNTER — TELEPHONE (OUTPATIENT)
Dept: ANTICOAGULATION | Facility: CLINIC | Age: 62
End: 2022-05-16
Payer: COMMERCIAL

## 2022-05-16 NOTE — TELEPHONE ENCOUNTER
ANTICOAGULATION     Owen Sahni is overdue for INR check.      Spoke with Collin and scheduled lab appointment on 5/24/2022    Tish Desai RN

## 2022-05-24 DIAGNOSIS — I42.1 HYPERTROPHIC OBSTRUCTIVE CARDIOMYOPATHY (H): ICD-10-CM

## 2022-05-24 DIAGNOSIS — I10 ESSENTIAL HYPERTENSION WITH GOAL BLOOD PRESSURE LESS THAN 140/90: ICD-10-CM

## 2022-05-24 RX ORDER — LOSARTAN POTASSIUM 100 MG/1
100 TABLET ORAL DAILY
Qty: 90 TABLET | Refills: 0 | Status: SHIPPED | OUTPATIENT
Start: 2022-05-24 | End: 2022-08-26

## 2022-05-24 NOTE — TELEPHONE ENCOUNTER
"Requested Prescriptions   Pending Prescriptions Disp Refills    losartan (COZAAR) 100 MG tablet [Pharmacy Med Name: LOSARTAN POTASSIUM 100 MG TAB] 90 tablet 3     Sig: Take 1 tablet (100 mg) by mouth daily New dose        Angiotensin-II Receptors Failed - 5/24/2022 12:10 AM        Failed - Last blood pressure under 140/90 in past 12 months       BP Readings from Last 3 Encounters:   01/27/22 (!) 160/74   01/12/22 (!) 174/112   07/08/21 (!) 148/80                 Passed - Recent (12 mo) or future (30 days) visit within the authorizing provider's specialty     Patient has had an office visit with the authorizing provider or a provider within the authorizing providers department within the previous 12 mos or has a future within next 30 days. See \"Patient Info\" tab in inbasket, or \"Choose Columns\" in Meds & Orders section of the refill encounter.              Passed - Medication is active on med list        Passed - Patient is age 18 or older        Passed - Normal serum creatinine on file in past 12 months     Recent Labs   Lab Test 01/27/22  0942   CR 1.20       Ok to refill medication if creatinine is low          Passed - Normal serum potassium on file in past 12 months       Recent Labs   Lab Test 01/27/22  0942   POTASSIUM 4.0                          "

## 2022-06-03 ENCOUNTER — TELEPHONE (OUTPATIENT)
Dept: ANTICOAGULATION | Facility: CLINIC | Age: 62
End: 2022-06-03
Payer: COMMERCIAL

## 2022-06-03 NOTE — TELEPHONE ENCOUNTER
ANTICOAGULATION     Owen Sahni is overdue for INR check.      Spoke with Collin  and scheduled lab appointment on 6/7/2022    Tish Desai RN

## 2022-06-07 ENCOUNTER — ANTICOAGULATION THERAPY VISIT (OUTPATIENT)
Dept: ANTICOAGULATION | Facility: CLINIC | Age: 62
End: 2022-06-07

## 2022-06-07 ENCOUNTER — LAB (OUTPATIENT)
Dept: LAB | Facility: CLINIC | Age: 62
End: 2022-06-07
Payer: COMMERCIAL

## 2022-06-07 DIAGNOSIS — I48.91 ATRIAL FIBRILLATION, UNSPECIFIED TYPE (H): ICD-10-CM

## 2022-06-07 DIAGNOSIS — Z79.01 LONG TERM (CURRENT) USE OF ANTICOAGULANTS: Primary | ICD-10-CM

## 2022-06-07 LAB — INR BLD: 2.3 (ref 0.9–1.1)

## 2022-06-07 PROCEDURE — 85610 PROTHROMBIN TIME: CPT

## 2022-06-07 PROCEDURE — 36415 COLL VENOUS BLD VENIPUNCTURE: CPT

## 2022-06-07 NOTE — PROGRESS NOTES
ANTICOAGULATION MANAGEMENT     Owen Sahni 61 year old male is on warfarin with therapeutic INR result. (Goal INR 2.0-3.0)    Recent labs: (last 7 days)     06/07/22  1617   INR 2.3*       ASSESSMENT       Source(s): Chart Review and Patient/Caregiver Call       Warfarin doses taken: Warfarin taken as instructed    Diet: No new diet changes identified    New illness, injury, or hospitalization: No    Medication/supplement changes: None noted    Signs or symptoms of bleeding or clotting: No    Previous INR: Supratherapeutic    Additional findings: last INR was done 6 weeks ago, scheduled for another 6 weeks since he is range       PLAN     Recommended plan for no diet, medication or health factor changes affecting INR     Dosing Instructions: continue your current warfarin dose with next INR in 6 weeks       Summary  As of 6/7/2022    Full warfarin instructions:  5 mg every Mon, Wed, Fri; 7.5 mg all other days   Next INR check:  7/19/2022             Telephone call with Collin who verbalizes understanding and agrees to plan and who agrees to plan and repeated back plan correctly    Lab visit scheduled    Education provided: Goal range and significance of current result, Monitoring for bleeding signs and symptoms and Monitoring for clotting signs and symptoms    Plan made per [facility] anticoagulation protocol    Ene Braga RN  Anticoagulation Clinic  6/7/2022    _______________________________________________________________________     Anticoagulation Episode Summary     Current INR goal:  2.0-3.0   TTR:  82.3 % (1 y)   Target end date:  10/26/2022   Send INR reminders to:  [facility]    Indications    Atrial fibrillation (H) [I48.91]  Long term (current) use of anticoagulants [Z79.01]  Atrial fibrillation  unspecified type (H) [I48.91]           Comments:           Anticoagulation Care Providers     Provider Role Specialty Phone number    Evelio Oliver MD Referring Cardiovascular Disease [phone]     Braulio Coronel MD [facility] [phone]

## 2022-06-24 DIAGNOSIS — I48.91 ATRIAL FIBRILLATION, UNSPECIFIED TYPE (H): ICD-10-CM

## 2022-06-24 RX ORDER — WARFARIN SODIUM 5 MG/1
TABLET ORAL
Qty: 114 TABLET | Refills: 1 | Status: SHIPPED | OUTPATIENT
Start: 2022-06-24 | End: 2022-10-21

## 2022-06-24 NOTE — TELEPHONE ENCOUNTER
ANTICOAGULATION MANAGEMENT:  Medication Refill    Anticoagulation Summary  As of 6/7/2022    Warfarin maintenance plan:  5 mg (5 mg x 1) every Mon, Wed, Fri; 7.5 mg (5 mg x 1.5) all other days   Next INR check:  7/19/2022   Target end date:  10/26/2022    Indications    Atrial fibrillation (H) [I48.91]  Long term (current) use of anticoagulants [Z79.01]  Atrial fibrillation  unspecified type (H) [I48.91]             Anticoagulation Care Providers     Provider Role Specialty Phone number    Evelio Oliver MD Referring Cardiovascular Disease [phone]    Braulio Coronel MD Referring Family Medicine [phone]          Visit with referring provider/group within last year: Yes    [facility] referral signed within last year: Yes    Owen meets all criteria for refill (current [facility] referral, office visit with referring provider/group in last year, lab monitoring up to date or not exceeding 2 weeks overdue). Rx instructions and quantity supplied updated to match patient's current dosing plan. Warfarin 90 day supply with 1 refill granted per [facility] protocol     Danielle Meza, RN  Anticoagulation Clinic

## 2022-07-12 DIAGNOSIS — Z79.01 LONG TERM (CURRENT) USE OF ANTICOAGULANTS: ICD-10-CM

## 2022-07-12 DIAGNOSIS — I48.91 ATRIAL FIBRILLATION, UNSPECIFIED TYPE (H): Primary | ICD-10-CM

## 2022-07-27 ENCOUNTER — TELEPHONE (OUTPATIENT)
Dept: ANTICOAGULATION | Facility: CLINIC | Age: 62
End: 2022-07-27

## 2022-07-27 NOTE — TELEPHONE ENCOUNTER
ANTICOAGULATION     Owen Sahni is overdue for INR check.      Spoke with Collin and scheduled lab appointment on 8/3/2022    Tish Desai RN

## 2022-08-25 DIAGNOSIS — I10 ESSENTIAL HYPERTENSION WITH GOAL BLOOD PRESSURE LESS THAN 140/90: ICD-10-CM

## 2022-08-25 DIAGNOSIS — I42.1 HYPERTROPHIC OBSTRUCTIVE CARDIOMYOPATHY (H): ICD-10-CM

## 2022-08-26 RX ORDER — LOSARTAN POTASSIUM 100 MG/1
100 TABLET ORAL DAILY
Qty: 30 TABLET | Refills: 0 | Status: SHIPPED | OUTPATIENT
Start: 2022-08-26 | End: 2022-09-14

## 2022-09-12 ENCOUNTER — TELEPHONE (OUTPATIENT)
Dept: FAMILY MEDICINE | Facility: CLINIC | Age: 62
End: 2022-09-12

## 2022-09-12 ENCOUNTER — ANTICOAGULATION THERAPY VISIT (OUTPATIENT)
Dept: ANTICOAGULATION | Facility: CLINIC | Age: 62
End: 2022-09-12

## 2022-09-12 ENCOUNTER — LAB (OUTPATIENT)
Dept: LAB | Facility: CLINIC | Age: 62
End: 2022-09-12
Payer: COMMERCIAL

## 2022-09-12 ENCOUNTER — DOCUMENTATION ONLY (OUTPATIENT)
Dept: ANTICOAGULATION | Facility: CLINIC | Age: 62
End: 2022-09-12

## 2022-09-12 ENCOUNTER — OFFICE VISIT (OUTPATIENT)
Dept: FAMILY MEDICINE | Facility: CLINIC | Age: 62
End: 2022-09-12

## 2022-09-12 VITALS
HEART RATE: 84 BPM | RESPIRATION RATE: 16 BRPM | TEMPERATURE: 96.6 F | BODY MASS INDEX: 43.97 KG/M2 | WEIGHT: 315 LBS | DIASTOLIC BLOOD PRESSURE: 80 MMHG | OXYGEN SATURATION: 97 % | SYSTOLIC BLOOD PRESSURE: 130 MMHG

## 2022-09-12 DIAGNOSIS — I48.91 ATRIAL FIBRILLATION, UNSPECIFIED TYPE (H): ICD-10-CM

## 2022-09-12 DIAGNOSIS — L28.1 PRURIGO NODULARIS: ICD-10-CM

## 2022-09-12 DIAGNOSIS — I48.91 ATRIAL FIBRILLATION (H): Primary | ICD-10-CM

## 2022-09-12 DIAGNOSIS — Z79.01 LONG TERM (CURRENT) USE OF ANTICOAGULANTS: Primary | ICD-10-CM

## 2022-09-12 DIAGNOSIS — I48.91 ATRIAL FIBRILLATION (H): ICD-10-CM

## 2022-09-12 DIAGNOSIS — Z79.01 LONG TERM (CURRENT) USE OF ANTICOAGULANTS: ICD-10-CM

## 2022-09-12 DIAGNOSIS — R63.5 WEIGHT GAIN: Primary | ICD-10-CM

## 2022-09-12 DIAGNOSIS — Z12.11 SCREEN FOR COLON CANCER: ICD-10-CM

## 2022-09-12 LAB
ALBUMIN SERPL-MCNC: 3.7 G/DL (ref 3.4–5)
ALP SERPL-CCNC: 82 U/L (ref 40–150)
ALT SERPL W P-5'-P-CCNC: 32 U/L (ref 0–70)
ANION GAP SERPL CALCULATED.3IONS-SCNC: 6 MMOL/L (ref 3–14)
AST SERPL W P-5'-P-CCNC: 25 U/L (ref 0–45)
BASOPHILS # BLD AUTO: 0.1 10E3/UL (ref 0–0.2)
BASOPHILS NFR BLD AUTO: 1 %
BILIRUB SERPL-MCNC: 0.7 MG/DL (ref 0.2–1.3)
BUN SERPL-MCNC: 17 MG/DL (ref 7–30)
CALCIUM SERPL-MCNC: 9.2 MG/DL (ref 8.5–10.1)
CHLORIDE BLD-SCNC: 105 MMOL/L (ref 94–109)
CO2 SERPL-SCNC: 27 MMOL/L (ref 20–32)
CREAT SERPL-MCNC: 1.15 MG/DL (ref 0.66–1.25)
EOSINOPHIL # BLD AUTO: 0.3 10E3/UL (ref 0–0.7)
EOSINOPHIL NFR BLD AUTO: 3 %
ERYTHROCYTE [DISTWIDTH] IN BLOOD BY AUTOMATED COUNT: 13.6 % (ref 10–15)
GFR SERPL CREATININE-BSD FRML MDRD: 72 ML/MIN/1.73M2
GLUCOSE BLD-MCNC: 113 MG/DL (ref 70–99)
HCT VFR BLD AUTO: 48.1 % (ref 40–53)
HGB BLD-MCNC: 15.6 G/DL (ref 13.3–17.7)
INR BLD: 3.1 (ref 0.9–1.1)
LYMPHOCYTES # BLD AUTO: 1.4 10E3/UL (ref 0.8–5.3)
LYMPHOCYTES NFR BLD AUTO: 17 %
MCH RBC QN AUTO: 29.9 PG (ref 26.5–33)
MCHC RBC AUTO-ENTMCNC: 32.4 G/DL (ref 31.5–36.5)
MCV RBC AUTO: 92 FL (ref 78–100)
MONOCYTES # BLD AUTO: 0.9 10E3/UL (ref 0–1.3)
MONOCYTES NFR BLD AUTO: 11 %
NEUTROPHILS # BLD AUTO: 5.6 10E3/UL (ref 1.6–8.3)
NEUTROPHILS NFR BLD AUTO: 68 %
PLATELET # BLD AUTO: 220 10E3/UL (ref 150–450)
POTASSIUM BLD-SCNC: 4.8 MMOL/L (ref 3.4–5.3)
PROT SERPL-MCNC: 8.3 G/DL (ref 6.8–8.8)
RBC # BLD AUTO: 5.21 10E6/UL (ref 4.4–5.9)
SODIUM SERPL-SCNC: 138 MMOL/L (ref 133–144)
TSH SERPL DL<=0.005 MIU/L-ACNC: 2.27 MU/L (ref 0.4–4)
WBC # BLD AUTO: 8.3 10E3/UL (ref 4–11)

## 2022-09-12 PROCEDURE — 85610 PROTHROMBIN TIME: CPT

## 2022-09-12 PROCEDURE — 99214 OFFICE O/P EST MOD 30 MIN: CPT | Performed by: PHYSICIAN ASSISTANT

## 2022-09-12 PROCEDURE — 36416 COLLJ CAPILLARY BLOOD SPEC: CPT

## 2022-09-12 PROCEDURE — 36415 COLL VENOUS BLD VENIPUNCTURE: CPT | Performed by: PHYSICIAN ASSISTANT

## 2022-09-12 PROCEDURE — 80050 GENERAL HEALTH PANEL: CPT | Performed by: PHYSICIAN ASSISTANT

## 2022-09-12 RX ORDER — FLUOCINONIDE 0.5 MG/G
CREAM TOPICAL 2 TIMES DAILY
Qty: 120 G | Refills: 0 | Status: SHIPPED | OUTPATIENT
Start: 2022-09-12 | End: 2024-08-05

## 2022-09-12 ASSESSMENT — PAIN SCALES - GENERAL: PAINLEVEL: MODERATE PAIN (5)

## 2022-09-12 NOTE — PATIENT INSTRUCTIONS
Kwame Polanco,    Thank you for allowing [facility] to manage your care.    I am unsure of the cause of your symptoms, but your exam is reassuring. We will see what our workup shows.     If you develop worsening/changing symptoms at any time, please call 911 or go to the emergency department for evaluation.    I ordered some lab work, please go to the laboratory to get your studies.    I sent your prescriptions to your pharmacy.    I made a referral, they will be calling in approximately 1 week to set up your appointment.  If you do not hear from them, please call the specialty number on your after visit summary.     Please allow 1-2 business days for our office to contact you in regards to your laboratory/radiological studies.  If not done so, I encourage you to login into [employer] [email or web address] to review your results as well.     If you have any questions or concerns, please feel free to call us at [phone]    Sincerely,    Stan Ferrera PA-C    Did you know?      You can schedule a video visit for follow-up appointments as well as future appointments for certain conditions.  Please see the below link.     [email or web address]    If you have not already done so,  I encourage you to sign up for [employer] ([email or web address]).  This will allow you to review your results, securely communicate with a provider, and schedule virtual visits as well.

## 2022-09-12 NOTE — TELEPHONE ENCOUNTER
Reason for Call:  Anticoagulation returning call    Detailed comments: Collin is returning a call to Tish Desai RN concerning his INR results.    Phone Number Patient can be reached at: Cell number on file:    Telephone Information:   Mobile [phone]       Best Time: Anytime    Can we leave a detailed message on this number? YES    Call taken on 9/12/2022 at 2:08 PM by Tracy Ledesma

## 2022-09-12 NOTE — PROGRESS NOTES
ANTICOAGULATION MANAGEMENT     Owen Sahni 62 year old male is on warfarin with therapeutic INR result. (Goal INR 2.0-3.0)    Recent labs: (last 7 days)     09/12/22  0831   INR 3.1*       ASSESSMENT       Source(s): Chart Review and Patient/Caregiver Call       Warfarin doses taken: Warfarin taken as instructed    Diet: No new diet changes identified    New illness, injury, or hospitalization: No    Medication/supplement changes: None noted    Signs or symptoms of bleeding or clotting: No    Previous INR: Therapeutic last visit; previously outside of goal range    Additional findings: None       PLAN     Recommended plan for no diet, medication or health factor changes affecting INR     Dosing Instructions: Continue your current warfarin dose with next INR in 2 weeks       Summary  As of 9/12/2022    Full warfarin instructions:  5 mg every Mon, Wed, Fri; 7.5 mg all other days   Next INR check:  9/26/2022             Telephone call with Collin who verbalizes understanding and agrees to plan    Lab visit scheduled    Education provided: Goal range and significance of current result, Importance of therapeutic range, Importance of following up at instructed interval and Contact [phone]  with any changes, questions or concerns.     Plan made per [facility] anticoagulation protocol    Tish Desai RN  Anticoagulation Clinic  9/12/2022    _______________________________________________________________________     Anticoagulation Episode Summary     Current INR goal:  2.0-3.0   TTR:  75.9 % (8.9 mo)   Target end date:  10/26/2022   Send INR reminders to:  [facility]    Indications    Atrial fibrillation (H) [I48.91]  Long term (current) use of anticoagulants [Z79.01]  Atrial fibrillation  unspecified type (H) [I48.91]           Comments:           Anticoagulation Care Providers     Provider Role Specialty Phone number    Evelio Oliver MD Referring Cardiovascular Disease [phone]    Braulio Coronel MD Referring  Family Medicine [phone]

## 2022-09-12 NOTE — PROGRESS NOTES
Assessment & Plan   Problem List Items Addressed This Visit    None     Visit Diagnoses     Weight gain    -  Primary    Relevant Orders    CBC with platelets and differential (Completed)    Comprehensive metabolic panel (BMP + Alb, Alk Phos, ALT, AST, Total. Bili, TP)    TSH with free T4 reflex    Screen for colon cancer        Relevant Orders    Colonoscopy Screening  Referral    Prurigo nodularis        Relevant Medications    fluocinonide (LIDEX) 0.05 % external cream    Other Relevant Orders    Adult Dermatology Referral         Patient has ongoing prurigo nodularis confirmed by biopsy.  No signs of concurrent infectious process or anaphylaxis/angioedema.  Symptoms of been stable for years, however now he feels self-conscious with the emergence of the monkeypox pandemic.  Will double cetirizine to 20mg daily as he has felt benefit form 10mg. Will change to Lidex cream and refer him back to dermatology for reevaluation as he has been seen by Dr. Johnson for this in the past, though she has not evaluated him in 2 years.  He would also like to have basic blood work done as he gets this done once a year at his request.  As he has had gradual weight gain, I will also add a TSH to this blood work.  No night sweats, weight loss or other worrisome symptoms.  He declined hereditary cancer screening.  Screening colonoscopy referral placed as he is overdue for this.    Complete history and physical exam as below. AF with normal VS.    DDx and Dx discussed with and explained to the pt to their satisfaction.  All questions were answered at this time. Pt expressed understanding of and agreement with this dx, tx, and plan. No further workup warranted and standard medication warnings given. I have given the patient a list of pertinent indications for re-evaluation. Will go to the Emergency Department if symptoms worsen or new concerning symptoms arise. Patient left in no apparent distress.     Ordering of each unique  test  Prescription drug management  32 minutes spent on the date of the encounter doing chart review, history and exam, documentation and further activities per the note       BMI:   Estimated body mass index is 43.97 kg/m  as calculated from the following:    Height as of 1/27/22: 1.829 m (6').    Weight as of this encounter: 147.1 kg (324 lb 3.2 oz).       See Patient Instructions    Return in about 1 month (around 10/12/2022) for a recheck of your symptoms if not improving wither dermatology.    SAMANTHA Mitchell  [facility] CJ Polanco is a 62 year old presenting for the following health issues:  Orders and Derm Problem      History of Present Illness       Reason for visit:  Blood analysis    He eats 2-3 servings of fruits and vegetables daily.He consumes 1 sweetened beverage(s) daily.He exercises with enough effort to increase his heart rate 10 to 19 minutes per day.  He exercises with enough effort to increase his heart rate 5 days per week.   He is taking medications regularly.       Concern - Sores  Onset: 2.5 years  Description: arms, shoulders, upper leg  Intensity: moderate  Progression of Symptoms:  worsening  Accompanying Signs & Symptoms: None  Previous history of similar problem: Ongoing  Precipitating factors:        Worsened by: No  Alleviating factors:        Improved by: antihistamine  Therapies tried and outcome: [place] skin doctor, hydrogen peroxide    Itching and tender sores to BUEs    Wants blood work for cancer screening. Cancer runs in the family    Review of Systems   Constitutional, HEENT, cardiovascular, pulmonary, gi and gu systems are negative, except as otherwise noted.      Objective    /80   Pulse 84   Temp (!) 96.6  F (35.9  C) (Tympanic)   Resp 16   Wt 147.1 kg (324 lb 3.2 oz)   SpO2 97%   BMI 43.97 kg/m    Body mass index is 43.97 kg/m .  Physical Exam  Vitals and nursing note reviewed.   Constitutional:       General: He is not in  acute distress.     Appearance: He is not ill-appearing or diaphoretic.   HENT:      Head: Normocephalic and atraumatic.      Mouth/Throat:      Mouth: Mucous membranes are moist.   Eyes:      Conjunctiva/sclera: Conjunctivae normal.   Cardiovascular:      Rate and Rhythm: Normal rate and regular rhythm.      Heart sounds: Normal heart sounds. No murmur heard.    No friction rub. No gallop.   Pulmonary:      Effort: Pulmonary effort is normal. No respiratory distress.      Breath sounds: Normal breath sounds. No stridor. No wheezing, rhonchi or rales.   Abdominal:      General: Bowel sounds are normal. There is no distension.      Palpations: Abdomen is soft. There is no mass.      Tenderness: There is no abdominal tenderness. There is no guarding or rebound.      Hernia: No hernia is present.   Skin:     General: Skin is warm and dry.      Comments: Excoriated papular lesions to bilateral UEs.  No other overlying signs of trauma or infection.   Neurological:      General: No focal deficit present.      Mental Status: He is alert. Mental status is at baseline.   Psychiatric:         Mood and Affect: Mood normal.         Behavior: Behavior normal.          Results for orders placed or performed in visit on 09/12/22   CBC with platelets and differential     Status: None   Result Value Ref Range    WBC Count 8.3 4.0 - 11.0 10e3/uL    RBC Count 5.21 4.40 - 5.90 10e6/uL    Hemoglobin 15.6 13.3 - 17.7 g/dL    Hematocrit 48.1 40.0 - 53.0 %    MCV 92 78 - 100 fL    MCH 29.9 26.5 - 33.0 pg    MCHC 32.4 31.5 - 36.5 g/dL    RDW 13.6 10.0 - 15.0 %    Platelet Count 220 150 - 450 10e3/uL    % Neutrophils 68 %    % Lymphocytes 17 %    % Monocytes 11 %    % Eosinophils 3 %    % Basophils 1 %    Absolute Neutrophils 5.6 1.6 - 8.3 10e3/uL    Absolute Lymphocytes 1.4 0.8 - 5.3 10e3/uL    Absolute Monocytes 0.9 0.0 - 1.3 10e3/uL    Absolute Eosinophils 0.3 0.0 - 0.7 10e3/uL    Absolute Basophils 0.1 0.0 - 0.2 10e3/uL   CBC with  platelets and differential     Status: None    Narrative    The following orders were created for panel order CBC with platelets and differential.  Procedure                               Abnormality         Status                     ---------                               -----------         ------                     CBC with platelets and d...[[id number]]                      Final result                 Please view results for these tests on the individual orders.   Results for orders placed or performed in visit on 09/12/22   INR point of care     Status: Abnormal   Result Value Ref Range    INR 3.1 (H) 0.9 - 1.1    Narrative    This test is intended for monitoring Coumadin therapy. Results are not accurate in patients with prolonged INR due to factor deficiency.

## 2022-09-12 NOTE — PROGRESS NOTES
ANTICOAGULATION CLINIC REFERRAL RENEWAL REQUEST       An annual renewal order is required for all patients referred to [facility]tico[facility].?  Please review and sign the pended referral order for Owen Sahni.       ANTICOAGULATION SUMMARY      Warfarin indication(s)   Atrial Fibrillation    Mechanical heart valve present?  NO       Current goal range   INR: 2.0-3.0     Goal appropriate for indication? Goal INR 2-3, standard for indication(s) above     Time in Therapeutic Range (TTR)  (Goal > 60%) 82.3%       Office visit with referring provider's group within last year yes on 1/27/2022       Tish Desai RN  [facility]ticoagulation [facility]

## 2022-09-12 NOTE — LETTER
September 12, 2022      Collin Sahni  [address] [address]        Dear ,    We are writing to inform you of your test results.    Your test results fall within the expected range(s) or remain unchanged from previous results.  Please continue with current treatment plan.    Resulted Orders   TSH with free T4 reflex   Result Value Ref Range    TSH 2.27 0.40 - 4.00 mU/L   Comprehensive metabolic panel (BMP + Alb, Alk Phos, ALT, AST, Total. Bili, TP)   Result Value Ref Range    Sodium 138 133 - 144 mmol/L    Potassium 4.8 3.4 - 5.3 mmol/L    Chloride 105 94 - 109 mmol/L    Carbon Dioxide (CO2) 27 20 - 32 mmol/L    Anion Gap 6 3 - 14 mmol/L    Urea Nitrogen 17 7 - 30 mg/dL    Creatinine 1.15 0.66 - 1.25 mg/dL    Calcium 9.2 8.5 - 10.1 mg/dL    Glucose 113 (H) 70 - 99 mg/dL    Alkaline Phosphatase 82 40 - 150 U/L    AST 25 0 - 45 U/L    ALT 32 0 - 70 U/L    Protein Total 8.3 6.8 - 8.8 g/dL    Albumin 3.7 3.4 - 5.0 g/dL    Bilirubin Total 0.7 0.2 - 1.3 mg/dL    GFR Estimate 72 >60 mL/min/1.73m2      Comment:      Effective December 21, 2021 eGFRcr in adults is calculated using the 2021 CKD-EPI creatinine equation which includes age and gender (Do faith al., [place], DOI: 10.1056/PDEAzt3174493)   CBC with platelets and differential   Result Value Ref Range    WBC Count 8.3 4.0 - 11.0 10e3/uL    RBC Count 5.21 4.40 - 5.90 10e6/uL    Hemoglobin 15.6 13.3 - 17.7 g/dL    Hematocrit 48.1 40.0 - 53.0 %    MCV 92 78 - 100 fL    MCH 29.9 26.5 - 33.0 pg    MCHC 32.4 31.5 - 36.5 g/dL    RDW 13.6 10.0 - 15.0 %    Platelet Count 220 150 - 450 10e3/uL    % Neutrophils 68 %    % Lymphocytes 17 %    % Monocytes 11 %    % Eosinophils 3 %    % Basophils 1 %    Absolute Neutrophils 5.6 1.6 - 8.3 10e3/uL    Absolute Lymphocytes 1.4 0.8 - 5.3 10e3/uL    Absolute Monocytes 0.9 0.0 - 1.3 10e3/uL    Absolute Eosinophils 0.3 0.0 - 0.7 10e3/uL    Absolute Basophils 0.1 0.0 - 0.2 10e3/uL       If you have any questions  or concerns, please call the clinic at the number listed above.       Sincerely,      SAMANTHA Mitchell

## 2022-09-14 ENCOUNTER — TELEPHONE (OUTPATIENT)
Dept: GASTROENTEROLOGY | Facility: CLINIC | Age: 62
End: 2022-09-14

## 2022-09-14 DIAGNOSIS — I42.1 HYPERTROPHIC OBSTRUCTIVE CARDIOMYOPATHY (H): ICD-10-CM

## 2022-09-14 DIAGNOSIS — I10 ESSENTIAL HYPERTENSION WITH GOAL BLOOD PRESSURE LESS THAN 140/90: ICD-10-CM

## 2022-09-14 RX ORDER — LOSARTAN POTASSIUM 100 MG/1
100 TABLET ORAL DAILY
Qty: 30 TABLET | Refills: 0 | Status: SHIPPED | OUTPATIENT
Start: 2022-09-14 | End: 2022-10-12

## 2022-09-14 NOTE — TELEPHONE ENCOUNTER
Screening Questions    BlueKIND OF PREP RedLOCATION [review exclusion criteria] GreenSEDATION TYPE      1. Are you active on mychart? y    2. What insurance is in the chart? [employer]     3.   Ordering/Referring Provider: Anish Ferrera PA       4. BMI   (If greater than 40 review exclusion criteria [PAC APPT IF [MAC] @ [facility])  43.9  [If yes, BMI OVER 40-EXTENDED PREP]      **(Sedation review/consideration needed)**  Do you have a legal guardian or Medical Power of    and/or are you able to give consent for your medical care?     Can give consent    5. Have you had a positive covid test in the last 90 days?   n -     6.  Are you currently on dialysis?   n [ If yes, G-PREP & HOSPITAL setting ONLY]     7.  Do you have chronic kidney disease?  n [ If yes, G-PREP ]    8.   Do you have a diagnosis of diabetes?   n   [ If yes, G-PREP ]    9.  On a regular basis do you go 3-5 days between bowel movements?   n   [ If yes, EXTENDED PREP]    10.  Are you taking any prescription pain medications on a routine schedule?    n -  [ If yes, EXTENDED PREP] [If yes, MAC]      11.   Do you have any chemical dependencies such as alcohol, street drugs, or methadone?    n [If yes, MAC]    12.   Do you have any history of post-traumatic stress syndrome, severe anxiety or history of psychosis?    n  [If yes, MAC]    13.  [FEMALES] Are you currently pregnant? n    If yes, how many weeks?       Respiratory/Heart Screening:  [If yes to any of the following HOSPITAL setting only]     14. Do you have Pulmonary Hypertension [Lungs]?   n       15. Do you have UNCONTROLLED asthma?   n     16.  Do you use daily home oxygen?  n      17. Do you have mod to severe Obstructive Sleep Apnea?         (OKAY @ [facility]  [facility]  SH  PH  RI  MG - if pt is not on OXYGEN)  n      18.   Have you had a heart or lung transplant?   n      19.   Have you had a stroke or Transient ischemic attack (TIA - aka  mini stroke ) within 6 months?  (If yes,  please review exclusion criteria)  n     20.   In the past 6 months, have you had any heart related issues including cardiomyopathy or heart attack?   n           If yes, did it require cardiac stenting or other implantable device?         21.   Do you have any implantable devices in your body (pacemaker, defib, LVAD)? (If yes, please review exclusion criteria)  n   22.  Do you take the medication Phentermine?     Yes-> Hold for 7 days before procedure.  Please consult your prescribing provider if you have questions about holding this medication.     No-> Continue to next question.    23. Do you take nitroglycerin?   n           If yes, how often?   (if yes, HOSPITAL setting ONLY)    24.  Are you currently taking any blood thinners?    [If yes, INFORM patient to follw up w/ ORDERING PROVIDER FOR BRIDGING INSTRUCTIONS]     n    25.   Do you transfer independently?                (If NO, please HOSPITAL setting ONLY)  y    26.   Preferred LOCAL Pharmacy for Pre Prescription:      [id number] - [place], [place] - [address]    Scheduling Details  (Please ask for phone number if not scheduled by patient)      Caller : Owen Sahni    Date of Procedure: 10/25  Surgeon: Pau  Location: [facility]        Sedation Type: MODERATE l   Conscious Sedation- Needs  for 6 hours after the procedure  MAC/General-Needs  for 24 hours after procedure    n :[Pre-op Required] at [facility]  MG and OR for MAC sedation   (advise patient they will need a pre-op WITH IN 30 DAYS of procedure date)     Type of Procedure Scheduled:   Lower Endoscopy [Colonoscopy]    Which Colonoscopy Prep was Sent?:   extended - mag citrate recall      KHOR[facility]S CF PATIENTS & GROEN'S PATIENTS NEEDS EXTENDED PREP       Informed patient they will need an adult  y  Cannot take any type of public or medical transportation alone    Pre-Procedure Covid test to be completed at [facility] or  Externally: home test  **INFORMED OF HOME TESTING & LAB OPTION**        Confirmed Nurse will call to complete assessment y    Additional comments:

## 2022-10-03 ENCOUNTER — TELEPHONE (OUTPATIENT)
Dept: DERMATOLOGY | Facility: CLINIC | Age: 62
End: 2022-10-03

## 2022-10-03 NOTE — TELEPHONE ENCOUNTER
Spoke with patient regarding required photos for dermatology appointment. Patient will not send in photos by email or do a video call, he stated he is does not want to send photos to people he does not know. 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  ANNAMARIE Dick

## 2022-10-05 ENCOUNTER — TELEPHONE (OUTPATIENT)
Dept: FAMILY MEDICINE | Facility: CLINIC | Age: 62
End: 2022-10-05

## 2022-10-05 ENCOUNTER — TELEPHONE (OUTPATIENT)
Dept: ANTICOAGULATION | Facility: CLINIC | Age: 62
End: 2022-10-05

## 2022-10-05 NOTE — TELEPHONE ENCOUNTER
ANTICOAGULATION     Owen Sahni is overdue for INR check.      Spoke with Collin and scheduled lab appointment on 10/6    Tish Desai RN

## 2022-10-05 NOTE — TELEPHONE ENCOUNTER
Patient is having an upcoming procedure. Will route to [facility] to advise on warfarin hold and if bridging is needed.    Date of procedure: 10/25    Type of procedure: colonoscopy    Procedure location:  GI    Physician completing procedure:  Dr. Mai    Patient diagnosis for anticoagulation:  Atrial Fib      Ene Braga, RN  [facility]

## 2022-10-06 ENCOUNTER — LAB (OUTPATIENT)
Dept: LAB | Facility: CLINIC | Age: 62
End: 2022-10-06
Payer: COMMERCIAL

## 2022-10-06 ENCOUNTER — ANTICOAGULATION THERAPY VISIT (OUTPATIENT)
Dept: ANTICOAGULATION | Facility: CLINIC | Age: 62
End: 2022-10-06

## 2022-10-06 DIAGNOSIS — I48.91 ATRIAL FIBRILLATION, UNSPECIFIED TYPE (H): ICD-10-CM

## 2022-10-06 DIAGNOSIS — Z79.01 LONG TERM (CURRENT) USE OF ANTICOAGULANTS: Primary | ICD-10-CM

## 2022-10-06 DIAGNOSIS — Z79.01 LONG TERM (CURRENT) USE OF ANTICOAGULANTS: ICD-10-CM

## 2022-10-06 LAB — INR BLD: 2.4 (ref 0.9–1.1)

## 2022-10-06 PROCEDURE — 36416 COLLJ CAPILLARY BLOOD SPEC: CPT

## 2022-10-06 PROCEDURE — 85610 PROTHROMBIN TIME: CPT

## 2022-10-06 NOTE — PROGRESS NOTES
ANTICOAGULATION MANAGEMENT     Owen Sahni 62 year old male is on warfarin with therapeutic INR result. (Goal INR 2.0-3.0)    Recent labs: (last 7 days)     10/06/22  1539   INR 2.4*       ASSESSMENT       Source(s): Chart Review and Patient/Caregiver Call       Warfarin doses taken: Warfarin taken as instructed    Diet: No new diet changes identified    New illness, injury, or hospitalization: No    Medication/supplement changes: None noted    Signs or symptoms of bleeding or clotting: No    Previous INR: Supratherapeutic    Additional findings: Upcoming surgery/procedure Colonoscopy scheduled for 10/25, advised that we will call back with hold plan and set up next appt once RPH gets plan approved       PLAN     Recommended plan for no diet, medication or health factor changes affecting INR     Dosing Instructions: Continue your current warfarin dose with next INR in 4 weeks       Summary  As of 10/6/2022    Full warfarin instructions:  5 mg every Mon, Wed, Fri; 7.5 mg all other days   Next INR check:  11/1/2022             Telephone call with Collin who verbalizes understanding and agrees to plan    Will schedule when we call patient back for colonoscopy hold plan     Education provided: Monitoring for bleeding signs and symptoms, Monitoring for clotting signs and symptoms and Contact [phone]  with any changes, questions or concerns.     Plan made per [facility] anticoagulation protocol    Ene Braga RN  Anticoagulation Clinic  10/6/2022    _______________________________________________________________________     Anticoagulation Episode Summary     Current INR goal:  2.0-3.0   TTR:  74.6 % (8.9 mo)   Target end date:  10/26/2022   Send INR reminders to:  [facility]    Indications    Atrial fibrillation (H) [I48.91]  Long term (current) use of anticoagulants [Z79.01]  Atrial fibrillation  unspecified type (H) [I48.91]           Comments:           Anticoagulation Care Providers     Provider Role  Specialty Phone number    Evelio Oliver MD Referring Cardiovascular Disease [phone]    Braulio Coronel MD Referring Family Medicine [phone]

## 2022-10-11 NOTE — TELEPHONE ENCOUNTER
TYRONE-PROCEDURAL ANTICOAGULATION  MANAGEMENT    ASSESSMENT     Warfarin interruption plan for colonoscopy on 10/25/2022.    Indication for Anticoagulation: Atrial Fibrillation      [id number] = 1 (Hypertension)      Tyrone-Procedure Risk stratification for thromboembolism: low (2017 ACC periprocedure pathway for NVAF Expert Consensus)    NVAF: 2017 ACC periprocedure pathway for NVAF advises NO bridge for low risk stratification ([id number] score <=4 and no prior hx of stroke, TIA or systemic embolism)     RECOMMENDATION       Pre-Procedure:  o Hold warfarin for 4 days, until after procedure starting: 10/21/2022   o No Bridge      Post-Procedure:  o Resume warfarin dose if okay with provider doing procedure on night of procedure, 10/25/2022 PM: 10mg  o Recheck INR ~ 7 days after resuming warfarin       Plan routed to referring provider for approval  ?   [facility]    SUBJECTIVE/OBJECTIVE     Owen Sahni, a 62 year old male    Goal INR Range: 2.0-3.0     Patient bridged in past: No      Wt Readings from Last 3 Encounters:   09/12/22 147.1 kg (324 lb 3.2 oz)   01/27/22 146.1 kg (322 lb)   07/08/21 (!) 154.2 kg (340 lb)      Ideal body weight: 77.6 kg (171 lb 1.2 oz)  Adjusted ideal body weight: 105.4 kg (232 lb 5.2 oz)     Estimated body mass index is 43.97 kg/m  as calculated from the following:    Height as of 1/27/22: 1.829 m (6').    Weight as of 9/12/22: 147.1 kg (324 lb 3.2 oz).    Lab Results   Component Value Date    INR 2.4 (H) 10/06/2022    INR 3.1 (H) 09/12/2022    INR 2.3 (H) 06/07/2022     Lab Results   Component Value Date    HGB 15.6 09/12/2022    HGB 15.2 07/08/2021    HCT 48.1 09/12/2022    HCT 47.5 07/08/2021     09/12/2022     07/08/2021     Lab Results   Component Value Date    CR 1.15 09/12/2022    CR 1.20 01/27/2022    CR 1.01 07/08/2021     Estimated Creatinine Clearance: 98 mL/min (based on SCr of 1.15 mg/dL).

## 2022-10-12 NOTE — TELEPHONE ENCOUNTER
Pt returned call and informed of warfarin recommendations prior to, and after surgery.     Pt verbalizes an understanding. No additional questions or concerns.     Joselin Turcios RN, BSN on 10/12/2022 at 1:58 PM

## 2022-10-12 NOTE — TELEPHONE ENCOUNTER
Left message for patient to return call.    Please see message below from [facility] regarding recommendations for upcoming surgery and warfarin medication    Joselin Turcios RN, BSN

## 2022-10-17 RX ORDER — BISACODYL 5 MG
TABLET, DELAYED RELEASE (ENTERIC COATED) ORAL
Qty: 4 TABLET | Refills: 0 | Status: SHIPPED | OUTPATIENT
Start: 2022-10-17

## 2022-10-18 RX ORDER — SODIUM, POTASSIUM,MAG SULFATES 17.5-3.13G
SOLUTION, RECONSTITUTED, ORAL ORAL
Qty: 354 ML | Refills: 0 | Status: SHIPPED | OUTPATIENT
Start: 2022-10-18

## 2022-10-20 DIAGNOSIS — I48.91 ATRIAL FIBRILLATION, UNSPECIFIED TYPE (H): ICD-10-CM

## 2022-10-21 RX ORDER — WARFARIN SODIUM 5 MG/1
TABLET ORAL
Qty: 114 TABLET | Refills: 1 | Status: SHIPPED | OUTPATIENT
Start: 2022-10-21 | End: 2023-07-05

## 2022-10-21 NOTE — TELEPHONE ENCOUNTER
ANTICOAGULATION MANAGEMENT:  Medication Refill    Anticoagulation Summary  As of 10/6/2022    Warfarin maintenance plan:  5 mg (5 mg x 1) every Mon, Wed, Fri; 7.5 mg (5 mg x 1.5) all other days; Starting 10/6/2022   Next INR check:  11/1/2022   Target end date:  10/26/2022    Indications    Atrial fibrillation (H) [I48.91]  Long term (current) use of anticoagulants [Z79.01]  Atrial fibrillation  unspecified type (H) [I48.91]             Anticoagulation Care Providers     Provider Role Specialty Phone number    Evelio Oliver MD Referring Cardiovascular Disease [phone]    Braulio Coronel MD Referring Family Medicine [phone]          Visit with referring provider/group within last year: Yes    [facility] referral signed within last year: Yes    Owen meets all criteria for refill (current [facility] referral, office visit with referring provider/group in last year, lab monitoring up to date or not exceeding 2 weeks overdue). Rx instructions and quantity match patient's current dosing plan. Warfarin 90 day supply with 1 refill granted per [facility] protocol     Ruthy Barrow RN  Anticoagulation Clinic

## 2022-10-25 ENCOUNTER — ANESTHESIA EVENT (OUTPATIENT)
Dept: SURGERY | Facility: AMBULATORY SURGERY CENTER | Age: 62
End: 2022-10-25
Payer: COMMERCIAL

## 2022-10-25 ENCOUNTER — ANESTHESIA (OUTPATIENT)
Dept: SURGERY | Facility: AMBULATORY SURGERY CENTER | Age: 62
End: 2022-10-25
Payer: COMMERCIAL

## 2022-10-25 ENCOUNTER — ANTICOAGULATION THERAPY VISIT (OUTPATIENT)
Dept: ANTICOAGULATION | Facility: CLINIC | Age: 62
End: 2022-10-25

## 2022-10-25 ENCOUNTER — HOSPITAL ENCOUNTER (OUTPATIENT)
Facility: AMBULATORY SURGERY CENTER | Age: 62
Discharge: HOME | End: 2022-10-25
Attending: SURGERY
Payer: COMMERCIAL

## 2022-10-25 VITALS
TEMPERATURE: 97.5 F | DIASTOLIC BLOOD PRESSURE: 114 MMHG | SYSTOLIC BLOOD PRESSURE: 156 MMHG | OXYGEN SATURATION: 95 % | RESPIRATION RATE: 16 BRPM

## 2022-10-25 VITALS — HEART RATE: 131 BPM

## 2022-10-25 DIAGNOSIS — Z79.01 LONG TERM (CURRENT) USE OF ANTICOAGULANTS: ICD-10-CM

## 2022-10-25 DIAGNOSIS — I48.91 ATRIAL FIBRILLATION (H): Primary | ICD-10-CM

## 2022-10-25 DIAGNOSIS — I48.91 ATRIAL FIBRILLATION, UNSPECIFIED TYPE (H): ICD-10-CM

## 2022-10-25 DIAGNOSIS — Z12.11 SCREENING FOR COLON CANCER: Primary | ICD-10-CM

## 2022-10-25 LAB
COLONOSCOPY: NORMAL
INR BLD: 1.3 (ref 0.9–1.1)

## 2022-10-25 PROCEDURE — 85610 PROTHROMBIN TIME: CPT | Performed by: FAMILY MEDICINE

## 2022-10-25 PROCEDURE — 45378 DIAGNOSTIC COLONOSCOPY: CPT

## 2022-10-25 PROCEDURE — G8918 PT W/O PREOP ORDER IV AB PRO: HCPCS

## 2022-10-25 PROCEDURE — G0105 COLORECTAL SCRN; HI RISK IND: HCPCS | Performed by: SURGERY

## 2022-10-25 PROCEDURE — G8907 PT DOC NO EVENTS ON DISCHARG: HCPCS

## 2022-10-25 RX ORDER — SODIUM CHLORIDE, SODIUM LACTATE, POTASSIUM CHLORIDE, CALCIUM CHLORIDE 600; 310; 30; 20 MG/100ML; MG/100ML; MG/100ML; MG/100ML
INJECTION, SOLUTION INTRAVENOUS CONTINUOUS
Status: DISCONTINUED | OUTPATIENT
Start: 2022-10-25 | End: 2022-10-26 | Stop reason: HOSPADM

## 2022-10-25 RX ORDER — NALOXONE HYDROCHLORIDE 0.4 MG/ML
0.2 INJECTION, SOLUTION INTRAMUSCULAR; INTRAVENOUS; SUBCUTANEOUS
Status: DISCONTINUED | OUTPATIENT
Start: 2022-10-25 | End: 2022-10-26 | Stop reason: HOSPADM

## 2022-10-25 RX ORDER — LIDOCAINE HYDROCHLORIDE 20 MG/ML
INJECTION, SOLUTION INFILTRATION; PERINEURAL PRN
Status: DISCONTINUED | OUTPATIENT
Start: 2022-10-25 | End: 2022-10-25

## 2022-10-25 RX ORDER — NALOXONE HYDROCHLORIDE 0.4 MG/ML
0.4 INJECTION, SOLUTION INTRAMUSCULAR; INTRAVENOUS; SUBCUTANEOUS
Status: DISCONTINUED | OUTPATIENT
Start: 2022-10-25 | End: 2022-10-26 | Stop reason: HOSPADM

## 2022-10-25 RX ORDER — PROPOFOL 10 MG/ML
INJECTION, EMULSION INTRAVENOUS CONTINUOUS PRN
Status: DISCONTINUED | OUTPATIENT
Start: 2022-10-25 | End: 2022-10-25

## 2022-10-25 RX ORDER — ONDANSETRON 4 MG/1
4 TABLET, ORALLY DISINTEGRATING ORAL EVERY 6 HOURS PRN
Status: DISCONTINUED | OUTPATIENT
Start: 2022-10-25 | End: 2022-10-26 | Stop reason: HOSPADM

## 2022-10-25 RX ORDER — ONDANSETRON 2 MG/ML
4 INJECTION INTRAMUSCULAR; INTRAVENOUS
Status: DISCONTINUED | OUTPATIENT
Start: 2022-10-25 | End: 2022-10-26 | Stop reason: HOSPADM

## 2022-10-25 RX ORDER — ONDANSETRON 2 MG/ML
4 INJECTION INTRAMUSCULAR; INTRAVENOUS EVERY 6 HOURS PRN
Status: DISCONTINUED | OUTPATIENT
Start: 2022-10-25 | End: 2022-10-26 | Stop reason: HOSPADM

## 2022-10-25 RX ORDER — FLUMAZENIL 0.1 MG/ML
0.2 INJECTION, SOLUTION INTRAVENOUS
Status: DISCONTINUED | OUTPATIENT
Start: 2022-10-25 | End: 2022-10-26 | Stop reason: HOSPADM

## 2022-10-25 RX ORDER — LIDOCAINE 40 MG/G
CREAM TOPICAL
Status: DISCONTINUED | OUTPATIENT
Start: 2022-10-25 | End: 2022-10-26 | Stop reason: HOSPADM

## 2022-10-25 RX ORDER — PROPOFOL 10 MG/ML
INJECTION, EMULSION INTRAVENOUS PRN
Status: DISCONTINUED | OUTPATIENT
Start: 2022-10-25 | End: 2022-10-25

## 2022-10-25 RX ORDER — PROCHLORPERAZINE MALEATE 10 MG
10 TABLET ORAL EVERY 6 HOURS PRN
Status: DISCONTINUED | OUTPATIENT
Start: 2022-10-25 | End: 2022-10-26 | Stop reason: HOSPADM

## 2022-10-25 RX ADMIN — LIDOCAINE HYDROCHLORIDE 100 MG: 20 INJECTION, SOLUTION INFILTRATION; PERINEURAL at 12:36

## 2022-10-25 RX ADMIN — PROPOFOL 150 MCG/KG/MIN: 10 INJECTION, EMULSION INTRAVENOUS at 12:38

## 2022-10-25 RX ADMIN — PROPOFOL 70 MG: 10 INJECTION, EMULSION INTRAVENOUS at 12:38

## 2022-10-25 RX ADMIN — SODIUM CHLORIDE, SODIUM LACTATE, POTASSIUM CHLORIDE, CALCIUM CHLORIDE: 600; 310; 30; 20 INJECTION, SOLUTION INTRAVENOUS at 12:13

## 2022-10-25 ASSESSMENT — ENCOUNTER SYMPTOMS: DYSRHYTHMIAS: 1

## 2022-10-25 NOTE — LETTER
October 25, 2022      Collin Sahni  [address] [facility] [address]        Dear ,    We are writing to inform you of your test results.    Your test results fall within the expected range(s) or remain unchanged from previous results.  Please continue with current treatment plan.    Resulted Orders   COLONOSCOPY   Result Value Ref Range    COLONOSCOPY       [facility]  Endoscopy Department-[place]  _______________________________________________________________________________  Patient Name: Owen Sahni          Procedure Date: 10/25/2022 12:29 PM  MRN: [id number]                       YOB: 1960  Admit Type: Outpatient                Age: 62  Gender: Male                          Note Status: Finalized  Attending MD: RICARDO CISSE MD  Instrument Name: [id number]  _______________________________________________________________________________     Procedure:                Colonoscopy  Indications:              High risk colon cancer surveillance: Personal                             history of colonic polyps  Providers:                RICARDO CISSE MD  Referring MD:             JAMES NICOLE  Medicines:                Monitored Anesthesia Care  Complications:            No immediate complications.  ____________________________________________________ ___________________________  Procedure:                Pre-Anesthesia Assessment:                            - Prior to the procedure, a History and Physical                             was performed, and patient medications, allergies                             and sensitivities were reviewed. The patient's                             tolerance of previous anesthesia was reviewed.                            - The risks and benefits of the procedure and the                             sedation options and risks were discussed with the                              patient. All questions were answered and informed                             consent was obtained.                            - After reviewing the risks and benefits, the                             patient was deemed in satisfactory condition to                             undergo the procedure.                            After obtaining informed consent, the colonoscope                             was passed under direct v ision. Throughout the                             procedure, the patient's blood pressure, pulse, and                             oxygen saturations were monitored continuously. The                             was introduced through the anus and advanced to the                             cecum, identified by appendiceal orifice and                             ileocecal valve. The colonoscopy was performed                             without difficulty. The patient tolerated the                             procedure well. The quality of the bowel                             preparation was good.                                                                                   Findings:       The digital rectal exam findings include anal papillae.       Diverticula were found in the sigmoid colon.       The exam was otherwise without abnormality.                                                                                   Moderate Sedation:       Moderate Sedation was not administe red  Impression:               - Anal papillae found on digital rectal exam.                            - Diverticulosis in the sigmoid colon.                            - The examination was otherwise normal.                            - No specimens collected.  Recommendation:           - Discharge patient to home.                            - High fiber diet and low fat diet.                            - Continue present medications.                            - Repeat colonoscopy in 10 years for screening                              purposes.                                                                                     __________________________  RICARDO CISSE MD  10/25/2022 12:51:35 PM  Number of Addenda: 0    Note Initiated On: 10/25/2022 12:29 PM  Scope In:  Scope Out:         If you have any questions or concerns, please call the clinic at the number listed above.       Sincerely,      Anish Ferrera PA

## 2022-10-25 NOTE — H&P
Patient seen for Endoscopy    HPI:  Patient is a 62 year old male with personal history of polyps. Has held his coumadin. No MI or CVA history. No issues with previous sedation. No recent acute illness.    Review Of Systems    Skin: negative  Ears/Nose/Throat: negative  Respiratory: No shortness of breath, dyspnea on exertion, cough, or hemoptysis  Cardiovascular: negative  Gastrointestinal: negative  Genitourinary: negative  Musculoskeletal: negative  Neurologic: negative  Hematologic/Lymphatic/Immunologic: negative  Endocrine: negative      Past Medical History:   Diagnosis Date     Heart murmur      Hypertension      Hypertrophic cardiomyopathy (H)        Past Surgical History:   Procedure Laterality Date     COLONOSCOPY  4/12/2012    Procedure:COLONOSCOPY; Colonoscopy, screening; Surgeon:ARANZA TEE; Location:[facility] OR       Family History   Problem Relation Age of Onset     Cancer Mother         breast     Cancer Father         Multiple myeloma     Hypertension Father      Arrhythmia No family hx of      Myocardial Infarction No family hx of      Diabetes No family hx of      Coronary Artery Disease No family hx of      Cerebrovascular Disease No family hx of        Social History     Socioeconomic History     Marital status: Single     Spouse name: Not on file     Number of children: Not on file     Years of education: Not on file     Highest education level: Not on file   Occupational History     Not on file   Tobacco Use     Smoking status: Never     Smokeless tobacco: Never     Tobacco comments:     non-smoking household   Vaping Use     Vaping Use: Never used   Substance and Sexual Activity     Alcohol use: Yes     Drug use: No     Sexual activity: Yes     Partners: Female   Other Topics Concern     Parent/sibling w/ CABG, MI or angioplasty before 65F 55M? No   Social History Narrative     Not on file     Social Determinants of Health     Financial Resource Strain: Not on file   Food Insecurity: Not on file    Transportation Needs: Not on file   Physical Activity: Not on file   Stress: Not on file   Social Connections: Not on file   Intimate Partner Violence: Not on file   Housing Stability: Not on file       Current Outpatient Medications   Medication Sig Dispense Refill     bisacodyl (DULCOLAX) 5 MG EC tablet 2 days prior to procedure, take 2 tablets at 4 pm. 1 day prior to procedure, take 2 tablets at 4 pm. For additional instructions refer to your colonoscopy prep instructions. 4 tablet 0     losartan (COZAAR) 100 MG tablet TAKE 1 TABLET BY MOUTH EVERY DAY 90 tablet 0     multivitamin w/minerals (THERA-VIT-M) tablet Take 1 tablet by mouth daily.       Na Sulfate-K Sulfate-Mg Sulf (SUPREP BOWEL PREP KIT) solution For additional instructions refer to your colonoscopy prep instructions. 354 mL 0     polyethylene glycol (GOLYTELY) 236 g suspension 2 days prior at 5pm, mix and drink half of a jug of Golytely. Drink an 8 oz. glass of Golytely every 15 minutes until half of the jug is gone. Place remainder of Golytely in the refrigerator. 1 day prior at 5 pm, drink the 2nd half of a jug of Golytely bowel prep. 6 hours before your check-in time, drink an 8 oz. glass of Golytely every 15 minutes until half of the 2nd jug of Golytely is gone. Discard remainder of second jug. 8000 mL 0     cetirizine (ZYRTEC) 10 MG tablet Take 1 tablet (10 mg) by mouth daily (Patient not taking: Reported on 9/12/2022) 90 tablet 1     clobetasol (TEMOVATE) 0.05 % external ointment Apply twice daily to bumps on arms and legs 120 g 0     fluocinonide (LIDEX) 0.05 % external cream Apply topically 2 times daily Do not use on face. 120 g 0     metoprolol succinate ER (TOPROL-XL) 50 MG 24 hr tablet Take 1 tablet (50 mg) by mouth daily (Patient not taking: No sig reported) 90 tablet 3     pramoxine (PRAX) 1 % LOTN lotion Apply to affected skin of arms and legs (Patient not taking: Reported on 9/12/2022) 237 mL 1     triamcinolone (KENALOG) 0.1 %  external cream Apply topically 2 times daily (Patient not taking: Reported on 9/12/2022) 454 g 0     warfarin ANTICOAGULANT (COUMADIN) 5 MG tablet TAKE 1 TAB BY MOUTH ONCE DAILY MON,WED,FRI AND 1.5 TABS DAILY ALL OTHER DAYS OR AS DIRECTED. 114 tablet 1       Medications and history reviewed    Physical exam:  Vitals: There were no vitals taken for this visit.  BMI= There is no height or weight on file to calculate BMI.    Constitutional: Healthy, alert, non-distressed   Head: Normo-cephalic, atraumatic, no lesions, masses or tenderness   Cardiovascular: RRR, no new murmurs, +S1, +S2 heart sounds, no clicks, rubs or gallops   Respiratory: CTAB, no rales, rhonchi or wheezing, equal chest rise, good respiratory effort   Gastrointestinal: Soft, non-tender, non distended, no rebound rigidity or guarding, no masses or hernias palpated   : Deferred  Musculoskeletal: Moves all extremities, normal  strength, no deformities noted   Skin: No suspicious lesions or rashes   Psychiatric: Mentation appears normal, affect appropriate   Hematologic/Lymphatic/Immunologic: Normal cervical and supraclavicular lymph nodes   Patient able to get up on table without difficulty.    Labs show:  Results for orders placed or performed during the hospital encounter of 10/25/22 (from the past 24 hour(s))   INR point of care   Result Value Ref Range    INR 1.3 (H) 0.9 - 1.1    Narrative    This test is intended for monitoring Coumadin therapy. Results are not accurate in patients with prolonged INR due to factor deficiency.       Assessment: Endoscopy  Plan: Pt cleared for anesthesia for proposed procedure.    Shahzad Mai DO

## 2022-10-25 NOTE — ANESTHESIA POSTPROCEDURE EVALUATION
Patient: Owen Sahni    Procedure: Procedure(s):  COLONOSCOPY, WITH CO2 INSUFFLATION       Anesthesia Type:  MAC    Note:  Disposition: Outpatient   Postop Pain Control: Uneventful            Sign Out: Well controlled pain   PONV: No   Neuro/Psych: Uneventful            Sign Out: Acceptable/Baseline neuro status   Airway/Respiratory: Uneventful            Sign Out: Acceptable/Baseline resp. status   CV/Hemodynamics: Uneventful            Sign Out: Acceptable CV status   Other NRE: NONE   DID A NON-ROUTINE EVENT OCCUR? No           Last vitals:  Vitals Value Taken Time   /114 10/25/22 1313   Temp 97.5  F (36.4  C) 10/25/22 1254   Pulse     Resp 16 10/25/22 1313   SpO2 95 % 10/25/22 1313       Electronically Signed By: Scott Walsh DO  October 25, 2022  1:39 PM

## 2022-10-25 NOTE — ANESTHESIA PREPROCEDURE EVALUATION
Anesthesia Pre-Procedure Evaluation    Patient: Owen Sahni   MRN: [id number] : 1960        Procedure : Procedure(s):  COLONOSCOPY, WITH CO2 INSUFFLATION          Past Medical History:   Diagnosis Date     Heart murmur      Hypertension      Hypertrophic cardiomyopathy (H)       Past Surgical History:   Procedure Laterality Date     COLONOSCOPY  2012    Procedure:COLONOSCOPY; Colonoscopy, screening; Surgeon:ARANZA TEE; Location:[facility] OR      Allergies   Allergen Reactions     Shellfish-Derived Products Anaphylaxis      Social History     Tobacco Use     Smoking status: Never     Smokeless tobacco: Never     Tobacco comments:     non-smoking household   Substance Use Topics     Alcohol use: Yes      Wt Readings from Last 1 Encounters:   22 147.1 kg (324 lb 3.2 oz)        Anesthesia Evaluation            ROS/MED HX  ENT/Pulmonary:     (+) sleep apnea,     Neurologic:  - neg neurologic ROS     Cardiovascular:     (+) hypertension-----dysrhythmias, a-fib, valvular problems/murmurs Previous cardiac testing   Echo: Date:  Results:  Interpretation Summary     Technically difficult study. The patient's rhythm is atrial fibrillation with  rapid ventricular rates.     Global and regional left ventricular function is normal with an EF of 55-  60%.There is mild concentric left ventricular hypertrophy with mild  asymmetrical septal hypertrophy measuring 15 mm. Chordal systolic anterior  motion of the anterior mitral leaflet cannot be excluded.  There is no significant dynamic LV outflow tract obstruction.  Right ventricular function, chamber size, wall motion, and thickness are  normal.  No pericardial effusion present.  Stress Test: Date: Results:    ECG Reviewed: Date: Results:    Cath: Date: Results:      METS/Exercise Tolerance:     Hematologic:  - neg hematologic  ROS     Musculoskeletal:  - neg musculoskeletal ROS     GI/Hepatic:  - neg GI/hepatic ROS     Renal/Genitourinary:  - neg Renal ROS      Endo:     (+) Obesity,     Psychiatric/Substance Use:  - neg psychiatric ROS     Infectious Disease:  - neg infectious disease ROS     Malignancy:  - neg malignancy ROS     Other:  - neg other ROS          Physical Exam    Airway  airway exam normal      Mallampati: I   TM distance: > 3 FB   Neck ROM: full   Mouth opening: > 3 cm    Respiratory Devices and Support         Dental  no notable dental history         Cardiovascular   cardiovascular exam normal       Rhythm and rate: regular and normal     Pulmonary   pulmonary exam normal        breath sounds clear to auscultation           OUTSIDE LABS:  CBC:   Lab Results   Component Value Date    WBC 8.3 09/12/2022    WBC 5.7 01/27/2022    HGB 15.6 09/12/2022    HGB 15.3 01/27/2022    HCT 48.1 09/12/2022    HCT 46.3 01/27/2022     09/12/2022     01/27/2022     BMP:   Lab Results   Component Value Date     09/12/2022     01/27/2022    POTASSIUM 4.8 09/12/2022    POTASSIUM 4.0 01/27/2022    CHLORIDE 105 09/12/2022    CHLORIDE 104 01/27/2022    CO2 27 09/12/2022    CO2 25 01/27/2022    BUN 17 09/12/2022    BUN 15 01/27/2022    CR 1.15 09/12/2022    CR 1.20 01/27/2022     (H) 09/12/2022     (H) 01/27/2022     COAGS:   Lab Results   Component Value Date    INR 2.4 (H) 10/06/2022     POC: No results found for: BGM, HCG, HCGS  HEPATIC:   Lab Results   Component Value Date    ALBUMIN 3.7 09/12/2022    PROTTOTAL 8.3 09/12/2022    ALT 32 09/12/2022    AST 25 09/12/2022    ALKPHOS 82 09/12/2022    BILITOTAL 0.7 09/12/2022     OTHER:   Lab Results   Component Value Date    A1C 5.6 06/06/2016    TATO 9.2 09/12/2022    TSH 2.27 09/12/2022       Anesthesia Plan    ASA Status:  3   NPO Status:  NPO Appropriate    Anesthesia Type: MAC.     - Reason for MAC: Deep or markedly invasive procedure (G8)   Induction: Propofol.   Maintenance: N/A.        Consents    Anesthesia Plan(s) and associated risks, benefits, and realistic alternatives  discussed. Questions answered and patient/representative(s) expressed understanding.    - Discussed:     - Discussed with:  Patient    Use of blood products discussed: No .     Postoperative Care            Comments:           H&P reviewed: Unable to attach H&P to encounter due to EHR limitations. H&P Update: appropriate H&P reviewed, patient examined. No interval changes since H&P (within 30 days).         Scott Walsh, DO

## 2022-10-25 NOTE — ANESTHESIA CARE TRANSFER NOTE
Patient: Owen Sahni    Procedure: Procedure(s):  COLONOSCOPY, WITH CO2 INSUFFLATION       Diagnosis: Screen for colon cancer [Z12.11]  Diagnosis Additional Information: No value filed.    Anesthesia Type:   MAC     Note:      Level of Consciousness: awake  Oxygen Supplementation: room air    Independent Airway: airway patency satisfactory and stable  Dentition: dentition unchanged  Vital Signs Stable: post-procedure vital signs reviewed and stable  Report to RN Given: handoff report given  Patient transferred to: Phase II    Handoff Report: Identifed the Patient, Identified the Reponsible Provider, Reviewed the pertinent medical history, Discussed the surgical course, Reviewed Intra-OP anesthesia mangement and issues during anesthesia, Set expectations for post-procedure period and Allowed opportunity for questions and acknowledgement of understanding      Vitals:  Vitals Value Taken Time   /101    Temp 97    Pulse 112    Resp 10    SpO2 97        Electronically Signed By: CARLENE Roy CRNA  October 25, 2022  12:53 PM

## 2022-10-25 NOTE — OR NURSING
Post colonoscopy patients BP was 135/93, 156/114.  Dr. Mai notified and he reported patient can discharge but needs to take his BP meds when he gets home. Patient instructed to take his BP med as soon as he can. Patient agreeable and discharged home with his friend (Chip).

## 2022-10-25 NOTE — PROGRESS NOTES
ANTICOAGULATION MANAGEMENT     Owen Sahni 62 year old male is on warfarin with therapeutic INR result. (Goal INR 2.0-3.0)    Recent labs: (last 7 days)     10/25/22  1217   INR 1.3*       ASSESSMENT       Source(s): Chart Review and Patient/Caregiver Call       Warfarin doses taken: Held for 4 days for colonoscopy  recently which may be affecting INR    Diet: No new diet changes identified    New illness, injury, or hospitalization: No    Medication/supplement changes: None noted    Signs or symptoms of bleeding or clotting: No    Previous INR: Therapeutic last visit; previously outside of goal range    Additional findings: colonoscopy       PLAN     Recommended plan for no diet, medication or health factor changes affecting INR     Dosing Instructions: Continue your current warfarin dose with next INR in 1 week       Summary  As of 10/25/2022    Full warfarin instructions:  5 mg every Mon, Wed, Fri; 7.5 mg all other days; Starting 10/25/2022   Next INR check:  11/1/2022             Telephone call with Collin who verbalizes understanding and agrees to plan    Lab visit scheduled    Education provided:     Please call back if any changes to your diet, medications or how you've been taking warfarin    Plan made per [facility] anticoagulation protocol    Danielle Meza RN  Anticoagulation Clinic  10/25/2022    _______________________________________________________________________     Anticoagulation Episode Summary     Current INR goal:  2.0-3.0   TTR:  70.1 % (8.9 mo)   Target end date:  10/26/2022   Send INR reminders to:  [facility]    Indications    Atrial fibrillation (H) [I48.91]  Long term (current) use of anticoagulants [Z79.01]  Atrial fibrillation  unspecified type (H) [I48.91]           Comments:           Anticoagulation Care Providers     Provider Role Specialty Phone number    Evelio Oliver MD Referring Cardiovascular Disease [phone]    Braulio Coronel MD Referring Family Medicine  [phone]

## 2022-11-01 NOTE — LETTER
"      Mr.James ADONIS Sahni  [address] [facility] [address]        June 2, 2020    Dear ,    You have an upcoming appointment with Dr. John for Mohs surgery. It is scheduled for 6/15  at 8:00 AM. Please check-in 15 minutes prior to your appointment at check-in desk \"D\" on the 2nd floor. Our address is [address] [place] [address].  Please review these important reminders:    1) Expect to be here the majority of the morning.  The Mohs surgical procedure can be an extensive surgery requiring multiple stages. Each stage may take 1-2 hours.     2) You may eat breakfast. You may bring snacks or beverages with you.  3) Take all normal medications morning of surgery -- unless otherwise indicated by your physician   If you have an artificial heart valve we will prescribe an antibiotic. Please take one hour prior to surgery.     4) You will need a  to be with you if your surgical site is close to your eyes. You can get swelling/bruising immediately after surgery and will go home with a big bulky bandage that could obstruct your view of driving safely.     5) Wear comfortable clothing -- preferably a button down shirt or a loose fitted shirt (to avoid pulling over pressure bandage off when you get home). If your surgery site is on your leg, try wearing loose fitted pants. If your surgery site is on your arm, try wearing a short-sleeve shirt.     6) Bring reading material or other items to help pass time. We do have internet access available if you own a laptop, iPad, etc.    7) Women - do NOT wear make-up. We will be washing it off anyone needing surgery on the face     8) Do NOT stop blood thinning medication unless instructed to do so by your surgeon. This will include: Warfarin, Coumadin, Eliquis, Jantoven, Aspirin, Plavix and Pradaxa. If you are taking Warfarin or Coumadin, please have your INR blood test results sent to our office no more than 7 days prior to your surgery.     9) Tylenol is a " good alternative to take for headaches and pain, and can be taken throughout your surgery and postoperative recovery.    If you need to reschedule or have any questions or concerns, please call me at [phone].    Sincerely,      [facility]          [facility].

## 2022-11-09 ENCOUNTER — TELEPHONE (OUTPATIENT)
Dept: ANTICOAGULATION | Facility: CLINIC | Age: 62
End: 2022-11-09

## 2022-11-09 NOTE — TELEPHONE ENCOUNTER
ANTICOAGULATION     wOen Sahni is overdue for INR check.      Spoke with Collin and scheduled lab appointment on 11/14    Tish Desai RN

## 2022-11-14 ENCOUNTER — ANTICOAGULATION THERAPY VISIT (OUTPATIENT)
Dept: ANTICOAGULATION | Facility: CLINIC | Age: 62
End: 2022-11-14

## 2022-11-14 ENCOUNTER — LAB (OUTPATIENT)
Dept: LAB | Facility: CLINIC | Age: 62
End: 2022-11-14
Payer: COMMERCIAL

## 2022-11-14 DIAGNOSIS — Z79.01 LONG TERM (CURRENT) USE OF ANTICOAGULANTS: ICD-10-CM

## 2022-11-14 DIAGNOSIS — I48.91 ATRIAL FIBRILLATION, UNSPECIFIED TYPE (H): Primary | ICD-10-CM

## 2022-11-14 DIAGNOSIS — I48.91 ATRIAL FIBRILLATION, UNSPECIFIED TYPE (H): ICD-10-CM

## 2022-11-14 LAB — INR BLD: 2.1 (ref 0.9–1.1)

## 2022-11-14 PROCEDURE — 36416 COLLJ CAPILLARY BLOOD SPEC: CPT

## 2022-11-14 PROCEDURE — 85610 PROTHROMBIN TIME: CPT

## 2022-11-14 NOTE — PROGRESS NOTES
ANTICOAGULATION MANAGEMENT     Owen Sahni 62 year old male is on warfarin with therapeutic INR result. (Goal INR 2.0-3.0)    Recent labs: (last 7 days)     11/14/22  1551   INR 2.1*       ASSESSMENT       Source(s): Chart Review and Patient/Caregiver Call       Warfarin doses taken: Warfarin taken as instructed    Diet: No new diet changes identified    New illness, injury, or hospitalization: No    Medication/supplement changes: None noted    Signs or symptoms of bleeding or clotting: No    Previous INR: Subtherapeutic    Additional findings: None       PLAN     Recommended plan for no diet, medication or health factor changes affecting INR     Dosing Instructions: Continue your current warfarin dose with next INR in 4 weeks       Summary  As of 11/14/2022    Full warfarin instructions:  5 mg every Mon, Wed, Fri; 7.5 mg all other days; Starting 11/14/2022   Next INR check:  12/12/2022             Telephone call with Collin who verbalizes understanding and agrees to plan    Lab visit scheduled    Education provided:     Contact [phone]  with any changes, questions or concerns.     Plan made per [facility] anticoagulation protocol    Ene Braga RN  Anticoagulation Clinic  11/14/2022    _______________________________________________________________________     Anticoagulation Episode Summary     Current INR goal:  2.0-3.0   TTR:  63.6 % (8.9 mo)   Target end date:  10/26/2022   Send INR reminders to:  [facility]    Indications    Atrial fibrillation (H) [I48.91]  Long term (current) use of anticoagulants [Z79.01]  Atrial fibrillation  unspecified type (H) [I48.91]           Comments:           Anticoagulation Care Providers     Provider Role Specialty Phone number    Evelio Oliver MD Referring Cardiovascular Disease [phone]    Braulio Coronel MD Referring Family Medicine [phone]

## 2022-11-25 ENCOUNTER — ALLIED HEALTH/NURSE VISIT (OUTPATIENT)
Dept: FAMILY MEDICINE | Facility: CLINIC | Age: 62
End: 2022-11-25
Payer: COMMERCIAL

## 2022-11-25 VITALS — DIASTOLIC BLOOD PRESSURE: 90 MMHG | HEART RATE: 52 BPM | SYSTOLIC BLOOD PRESSURE: 148 MMHG

## 2022-11-25 DIAGNOSIS — Z01.30 BLOOD PRESSURE CHECK: Primary | ICD-10-CM

## 2022-11-25 PROCEDURE — 99207 PR DROP WITH A PROCEDURE: CPT | Performed by: FAMILY MEDICINE

## 2022-11-25 NOTE — PROGRESS NOTES
Owen Sahni was evaluated at [facility] on November 25, 2022 at which time his blood pressure was:    BP Readings from Last 3 Encounters:   11/25/22 (!) 148/90   10/25/22 (!) 156/114   09/12/22 130/80     Pulse Readings from Last 3 Encounters:   11/25/22 52   10/25/22 (!) 131   09/12/22 84       Reviewed lifestyle modifications for blood pressure control and reduction: including making healthy food choices, managing weight, getting regular exercise, smoking cessation, reducing alcohol consumption, monitoring blood pressure regularly.     Symptoms: None    BP Goal:< 140/90 mmHg    BP Assessment:  BP too high    Potential Reasons for BP too high: NA - Not applicable    BP Follow-Up Plan: Recheck BP in 30 days at pharmacy    Recommendation to Provider: n/a    Note completed by: Renata Miller, Pharm D  [facility]  [phone]

## 2022-12-21 ENCOUNTER — TELEPHONE (OUTPATIENT)
Dept: ANTICOAGULATION | Facility: CLINIC | Age: 62
End: 2022-12-21

## 2022-12-21 NOTE — TELEPHONE ENCOUNTER
ANTICOAGULATION     Owen Sahni is overdue for INR check.      Left message for patient to call and schedule lab appointment as soon as possible. If returning call, please schedule.     Een Braga RN

## 2022-12-26 ENCOUNTER — LAB (OUTPATIENT)
Dept: LAB | Facility: CLINIC | Age: 62
End: 2022-12-26
Payer: COMMERCIAL

## 2022-12-26 ENCOUNTER — ANTICOAGULATION THERAPY VISIT (OUTPATIENT)
Dept: ANTICOAGULATION | Facility: CLINIC | Age: 62
End: 2022-12-26

## 2022-12-26 DIAGNOSIS — I48.91 ATRIAL FIBRILLATION, UNSPECIFIED TYPE (H): Primary | ICD-10-CM

## 2022-12-26 DIAGNOSIS — Z79.01 LONG TERM (CURRENT) USE OF ANTICOAGULANTS: ICD-10-CM

## 2022-12-26 DIAGNOSIS — I48.91 ATRIAL FIBRILLATION, UNSPECIFIED TYPE (H): ICD-10-CM

## 2022-12-26 LAB — INR BLD: 2.7 (ref 0.9–1.1)

## 2022-12-26 PROCEDURE — 36416 COLLJ CAPILLARY BLOOD SPEC: CPT

## 2022-12-26 PROCEDURE — 85610 PROTHROMBIN TIME: CPT

## 2022-12-26 NOTE — PROGRESS NOTES
ANTICOAGULATION MANAGEMENT     Owen Sahni 62 year old male is on warfarin with therapeutic INR result. (Goal INR 2.0-3.0)    Recent labs: (last 7 days)     12/26/22  1234   INR 2.7*       ASSESSMENT       Source(s): Chart Review and Patient/Caregiver Call       Warfarin doses taken: Warfarin taken as instructed    Diet: No new diet changes identified    New illness, injury, or hospitalization: No    Medication/supplement changes: None noted    Signs or symptoms of bleeding or clotting: No    Previous INR: Therapeutic last 2(+) visits    Additional findings: None       PLAN     Recommended plan for no diet, medication or health factor changes affecting INR     Dosing Instructions: Continue your current warfarin dose with next INR in 5 weeks       Summary  As of 12/26/2022    Full warfarin instructions:  5 mg every Mon, Wed, Fri; 7.5 mg all other days   Next INR check:  1/30/2023             Telephone call with Collin who verbalizes understanding and agrees to plan    Lab visit scheduled    Education provided:     Please call back if any changes to your diet, medications or how you've been taking warfarin    Goal range and lab monitoring: goal range and significance of current result, Importance of therapeutic range and Importance of following up at instructed interval    Plan made per [facility] anticoagulation protocol    Tish Desai RN  Anticoagulation Clinic  12/26/2022    _______________________________________________________________________     Anticoagulation Episode Summary     Current INR goal:  2.0-3.0   TTR:  64.7 % (8.9 mo)   Target end date:  10/26/2022   Send INR reminders to:  [facility]    Indications    Atrial fibrillation (H) [I48.91]  Long term (current) use of anticoagulants [Z79.01]  Atrial fibrillation  unspecified type (H) [I48.91]           Comments:           Anticoagulation Care Providers     Provider Role Specialty Phone number    Evelio Oliver MD Referring Cardiovascular Disease  [phone]    Braulio Coronel MD [facility] [phone]

## 2023-01-11 DIAGNOSIS — I42.1 HYPERTROPHIC OBSTRUCTIVE CARDIOMYOPATHY (H): ICD-10-CM

## 2023-01-11 DIAGNOSIS — I10 ESSENTIAL HYPERTENSION WITH GOAL BLOOD PRESSURE LESS THAN 140/90: ICD-10-CM

## 2023-01-16 RX ORDER — LOSARTAN POTASSIUM 100 MG/1
TABLET ORAL
Qty: 90 TABLET | Refills: 0 | Status: SHIPPED | OUTPATIENT
Start: 2023-01-16 | End: 2023-04-05

## 2023-02-01 ENCOUNTER — ANTICOAGULATION THERAPY VISIT (OUTPATIENT)
Dept: ANTICOAGULATION | Facility: CLINIC | Age: 63
End: 2023-02-01

## 2023-02-01 ENCOUNTER — LAB (OUTPATIENT)
Dept: LAB | Facility: CLINIC | Age: 63
End: 2023-02-01
Payer: COMMERCIAL

## 2023-02-01 DIAGNOSIS — I48.91 ATRIAL FIBRILLATION, UNSPECIFIED TYPE (H): Primary | ICD-10-CM

## 2023-02-01 DIAGNOSIS — Z79.01 LONG TERM (CURRENT) USE OF ANTICOAGULANTS: ICD-10-CM

## 2023-02-01 DIAGNOSIS — I48.91 ATRIAL FIBRILLATION, UNSPECIFIED TYPE (H): ICD-10-CM

## 2023-02-01 LAB — INR BLD: 2 (ref 0.9–1.1)

## 2023-02-01 PROCEDURE — 85610 PROTHROMBIN TIME: CPT

## 2023-02-01 PROCEDURE — 36416 COLLJ CAPILLARY BLOOD SPEC: CPT

## 2023-02-01 NOTE — PROGRESS NOTES
ANTICOAGULATION MANAGEMENT     Owen Sahni 62 year old male is on warfarin with therapeutic INR result. (Goal INR 2.0-3.0)    Recent labs: (last 7 days)     02/01/23  1318   INR 2.0*       ASSESSMENT       Source(s): Chart Review and Patient/Caregiver Call       Warfarin doses taken: Warfarin taken as instructed    Diet: No new diet changes identified    New illness, injury, or hospitalization: No    Medication/supplement changes: None noted    Signs or symptoms of bleeding or clotting: No    Previous INR: Therapeutic last 2(+) visits    Additional findings: None       PLAN     Recommended plan for no diet, medication or health factor changes affecting INR     Dosing Instructions: Continue your current warfarin dose with next INR in 6 weeks       Summary  As of 2/1/2023    Full warfarin instructions:  5 mg every Mon, Wed, Fri; 7.5 mg all other days   Next INR check:  3/15/2023             Telephone call with Collin who verbalizes understanding and agrees to plan    Lab visit scheduled    Education provided:     Contact [phone]  with any changes, questions or concerns.     Plan made per [facility] anticoagulation protocol    Ene Braga RN  Anticoagulation Clinic  2/1/2023    _______________________________________________________________________     Anticoagulation Episode Summary     Current INR goal:  2.0-3.0   TTR:  78.4 % (8.9 mo)   Target end date:  10/26/2022   Send INR reminders to:  [facility]    Indications    Atrial fibrillation (H) [I48.91]  Long term (current) use of anticoagulants [Z79.01]  Atrial fibrillation  unspecified type (H) [I48.91]           Comments:           Anticoagulation Care Providers     Provider Role Specialty Phone number    Evelio Oliver MD Referring Cardiovascular Disease [phone]    Braulio Coronel MD Referring Family Medicine [phone]

## 2023-03-23 ENCOUNTER — LAB (OUTPATIENT)
Dept: LAB | Facility: CLINIC | Age: 63
End: 2023-03-23
Payer: COMMERCIAL

## 2023-03-23 ENCOUNTER — ANTICOAGULATION THERAPY VISIT (OUTPATIENT)
Dept: ANTICOAGULATION | Facility: CLINIC | Age: 63
End: 2023-03-23

## 2023-03-23 DIAGNOSIS — I48.91 ATRIAL FIBRILLATION, UNSPECIFIED TYPE (H): ICD-10-CM

## 2023-03-23 DIAGNOSIS — I48.91 ATRIAL FIBRILLATION, UNSPECIFIED TYPE (H): Primary | ICD-10-CM

## 2023-03-23 DIAGNOSIS — Z79.01 LONG TERM (CURRENT) USE OF ANTICOAGULANTS: ICD-10-CM

## 2023-03-23 LAB — INR BLD: 2.3 (ref 0.9–1.1)

## 2023-03-23 PROCEDURE — 85610 PROTHROMBIN TIME: CPT

## 2023-03-23 PROCEDURE — 36416 COLLJ CAPILLARY BLOOD SPEC: CPT

## 2023-03-23 NOTE — PROGRESS NOTES
ANTICOAGULATION MANAGEMENT     Owen Sahni 62 year old male is on warfarin with therapeutic INR result. (Goal INR 2.0-3.0)    Recent labs: (last 7 days)     03/23/23  1509   INR 2.3*       ASSESSMENT       Source(s): Chart Review and Patient/Caregiver Call       Warfarin doses taken: Warfarin taken as instructed    Diet: No new diet changes identified    New illness, injury, or hospitalization: No    Medication/supplement changes: None noted    Signs or symptoms of bleeding or clotting: No    Previous INR: Therapeutic last 2(+) visits    Additional findings: None         PLAN     Recommended plan for no diet, medication or health factor changes affecting INR     Dosing Instructions: Continue your current warfarin dose with next INR in 6 weeks       Summary  As of 3/23/2023    Full warfarin instructions:  5 mg every Mon, Wed, Fri; 7.5 mg all other days   Next INR check:  5/4/2023             Telephone call with Collin who verbalizes understanding and agrees to plan and who agrees to plan and repeated back plan correctly    Lab visit scheduled    Education provided:     None required    Plan made per [facility] anticoagulation protocol    Connie Gonsales, RN  Anticoagulation Clinic  3/23/2023    _______________________________________________________________________     Anticoagulation Episode Summary     Current INR goal:  2.0-3.0   TTR:  79.4 % (8.9 mo)   Target end date:  Indefinite   Send INR reminders to:  [facility]    Indications    Atrial fibrillation (H) [I48.91]  Long term (current) use of anticoagulants [Z79.01]  Atrial fibrillation  unspecified type (H) [I48.91]           Comments:           Anticoagulation Care Providers     Provider Role Specialty Phone number    Evelio Oliver MD Referring Cardiovascular Disease [phone]    Braulio Coronel MD Referring Family Medicine [phone]

## 2023-04-05 DIAGNOSIS — I10 ESSENTIAL HYPERTENSION WITH GOAL BLOOD PRESSURE LESS THAN 140/90: ICD-10-CM

## 2023-04-05 DIAGNOSIS — I42.1 HYPERTROPHIC OBSTRUCTIVE CARDIOMYOPATHY (H): ICD-10-CM

## 2023-04-05 RX ORDER — LOSARTAN POTASSIUM 100 MG/1
TABLET ORAL
Qty: 90 TABLET | Refills: 0 | Status: SHIPPED | OUTPATIENT
Start: 2023-04-05 | End: 2023-07-05

## 2023-05-04 ENCOUNTER — DOCUMENTATION ONLY (OUTPATIENT)
Dept: LAB | Facility: CLINIC | Age: 63
End: 2023-05-04

## 2023-05-04 ENCOUNTER — ANTICOAGULATION THERAPY VISIT (OUTPATIENT)
Dept: ANTICOAGULATION | Facility: CLINIC | Age: 63
End: 2023-05-04

## 2023-05-04 ENCOUNTER — LAB (OUTPATIENT)
Dept: LAB | Facility: CLINIC | Age: 63
End: 2023-05-04
Payer: COMMERCIAL

## 2023-05-04 DIAGNOSIS — Z79.01 LONG TERM (CURRENT) USE OF ANTICOAGULANTS: ICD-10-CM

## 2023-05-04 DIAGNOSIS — I48.91 ATRIAL FIBRILLATION, UNSPECIFIED TYPE (H): Primary | ICD-10-CM

## 2023-05-04 DIAGNOSIS — I48.91 ATRIAL FIBRILLATION, UNSPECIFIED TYPE (H): ICD-10-CM

## 2023-05-04 LAB — INR BLD: 2.9 (ref 0.9–1.1)

## 2023-05-04 PROCEDURE — 85610 PROTHROMBIN TIME: CPT

## 2023-05-04 PROCEDURE — 36416 COLLJ CAPILLARY BLOOD SPEC: CPT

## 2023-05-04 NOTE — PROGRESS NOTES
ANTICOAGULATION MANAGEMENT     Owen Sahni 62 year old male is on warfarin with therapeutic INR result. (Goal INR 2.0-3.0)    Recent labs: (last 7 days)     05/04/23  1552   INR 2.9*       ASSESSMENT       Source(s): Chart Review and Patient/Caregiver Call       Warfarin doses taken: Warfarin taken as instructed    Diet: No new diet changes identified    Medication/supplement changes: None noted    New illness, injury, or hospitalization: No    Signs or symptoms of bleeding or clotting: No    Previous result: Therapeutic last 2(+) visits    Additional findings: None         PLAN     Recommended plan for no diet, medication or health factor changes affecting INR     Dosing Instructions: Continue your current warfarin dose with next INR in 6 weeks       Summary  As of 5/4/2023    Full warfarin instructions:  5 mg every Mon, Wed, Fri; 7.5 mg all other days   Next INR check:  6/5/2023             Telephone call with Collin who verbalizes understanding and agrees to plan and who agrees to plan and repeated back plan correctly    Lab visit scheduled    Education provided:     Please call back if any changes to your diet, medications or how you've been taking warfarin    Plan made per [facility] anticoagulation protocol    Aman Armenta, RN  Anticoagulation Clinic  5/4/2023    _______________________________________________________________________     Anticoagulation Episode Summary     Current INR goal:  2.0-3.0   TTR:  87.7 % (8.9 mo)   Target end date:  Indefinite   Send INR reminders to:  [facility]    Indications    Atrial fibrillation (H) [I48.91]  Long term (current) use of anticoagulants [Z79.01]  Atrial fibrillation  unspecified type (H) [I48.91]           Comments:           Anticoagulation Care Providers     Provider Role Specialty Phone number    Evelio Oliver MD Referring Cardiovascular Disease [phone]    Braulio Coronel MD Referring Family Medicine [phone]

## 2023-05-04 NOTE — PROGRESS NOTES
Patient is scheduled for a Lab appointment this afternoon and is requesting to be tested for insulin resistance.  Please enter future orders, or call to advise patient to cancel appointment if labs are not needed.    Thank you,  Evelyn West, CMA

## 2023-05-04 NOTE — PROGRESS NOTES
Patient came into lab and had an INR done.  We explained that we did not have orders for the insulin testing.  He said he needs to find a new PCP anyways and will discuss that testing at his next visit with new PCP.  Monica Gillespie MLT at [facility]

## 2023-05-26 ENCOUNTER — DOCUMENTATION ONLY (OUTPATIENT)
Dept: LAB | Facility: CLINIC | Age: 63
End: 2023-05-26
Payer: COMMERCIAL

## 2023-05-26 DIAGNOSIS — Z12.5 SCREENING PSA (PROSTATE SPECIFIC ANTIGEN): ICD-10-CM

## 2023-05-26 DIAGNOSIS — Z13.6 CARDIOVASCULAR SCREENING; LDL GOAL LESS THAN 130: ICD-10-CM

## 2023-05-26 DIAGNOSIS — I10 ESSENTIAL HYPERTENSION WITH GOAL BLOOD PRESSURE LESS THAN 140/90: Primary | ICD-10-CM

## 2023-05-26 NOTE — PROGRESS NOTES
Owen Sahni has an upcoming lab appointment Patient is also requesting insulin resistance testing.    Future Appointments   Date Time Provider Department Center   6/5/2023  3:30 PM AN [facility]   6/5/2023  5:00 PM [facility]         There is no order available. Please review and place either future orders or HMPO (Review of Health Maintenance Protocol Orders), as appropriate.    Health Maintenance Due   Topic     ANNUAL REVIEW OF HM ORDERS      HIV SCREENING      LIPID      Darlene Bowers

## 2023-06-14 NOTE — PROGRESS NOTES
Assessment & Plan     Prediabetes  Ongoing  - Basic metabolic panel  (Ca, Cl, CO2, Creat, Gluc, K, Na, BUN); Future  - Hemoglobin A1c; Future  - Hemoglobin A1c  Seen in labs, discussed insulin resistance with belly fat and that the labs support this.   Works hard on eating healthy diet and is somewhat active with activity primarily at work  Will consider metformin after checking the labs    CARDIOVASCULAR SCREENING; LDL GOAL LESS THAN 130  recheck  - Lipid Profile    Essential hypertension with goal blood pressure less than 140/90  Recheck, stable  - Renal panel             BMI:   Estimated body mass index is 43.4 kg/m  as calculated from the following:    Height as of this encounter: 1.829 m (6').    Weight as of this encounter: 145.2 kg (320 lb).   Weight management plan: Discussed healthy diet and exercise guidelines    See Patient Instructions    JOSE CARLOS PRATHER [facility]        Onel Polanco is a 62 year old, presenting for the following health issues:  Diabetes, Shingles, and INR Followup               View : No data to display.              History of Present Illness       Reason for visit:  I n r    He eats 0-1 servings of fruits and vegetables daily.He consumes 1 sweetened beverage(s) daily.He exercises with enough effort to increase his heart rate 10 to 19 minutes per day.  He exercises with enough effort to increase his heart rate 4 days per week.   He is taking medications regularly.               Review of Systems   Constitutional, HEENT, cardiovascular, pulmonary, gi and gu systems are negative, except as otherwise noted.      Objective    /86   Pulse 67   Temp 97.6  F (36.4  C) (Tympanic)   Resp 16   Ht 1.829 m (6')   Wt 145.2 kg (320 lb)   SpO2 97%   BMI 43.40 kg/m    Body mass index is 43.4 kg/m .  Physical Exam   GENERAL: healthy, alert and no distress  NECK: no adenopathy, no asymmetry, masses, or scars and thyroid normal to palpation  RESP: lungs  clear to auscultation - no rales, rhonchi or wheezes  CV: regular rate and rhythm, normal S1 S2, no S3 or S4, no murmur, click or rub, no peripheral edema and peripheral pulses strong  ABDOMEN: soft, nontender, no hepatosplenomegaly, no masses and bowel sounds normal  MS: no gross musculoskeletal defects noted, no edema

## 2023-06-21 ENCOUNTER — ANTICOAGULATION THERAPY VISIT (OUTPATIENT)
Dept: ANTICOAGULATION | Facility: CLINIC | Age: 63
End: 2023-06-21

## 2023-06-21 ENCOUNTER — OFFICE VISIT (OUTPATIENT)
Dept: FAMILY MEDICINE | Facility: CLINIC | Age: 63
End: 2023-06-21
Payer: COMMERCIAL

## 2023-06-21 VITALS
BODY MASS INDEX: 42.66 KG/M2 | WEIGHT: 315 LBS | OXYGEN SATURATION: 97 % | SYSTOLIC BLOOD PRESSURE: 134 MMHG | DIASTOLIC BLOOD PRESSURE: 86 MMHG | HEIGHT: 72 IN | TEMPERATURE: 97.6 F | RESPIRATION RATE: 16 BRPM | HEART RATE: 67 BPM

## 2023-06-21 DIAGNOSIS — R73.03 PREDIABETES: Primary | ICD-10-CM

## 2023-06-21 DIAGNOSIS — Z13.6 CARDIOVASCULAR SCREENING; LDL GOAL LESS THAN 130: ICD-10-CM

## 2023-06-21 DIAGNOSIS — I10 ESSENTIAL HYPERTENSION WITH GOAL BLOOD PRESSURE LESS THAN 140/90: ICD-10-CM

## 2023-06-21 DIAGNOSIS — I48.91 ATRIAL FIBRILLATION, UNSPECIFIED TYPE (H): ICD-10-CM

## 2023-06-21 DIAGNOSIS — Z79.01 LONG TERM (CURRENT) USE OF ANTICOAGULANTS: ICD-10-CM

## 2023-06-21 LAB
ALBUMIN SERPL BCG-MCNC: 4.1 G/DL (ref 3.5–5.2)
ANION GAP SERPL CALCULATED.3IONS-SCNC: 11 MMOL/L (ref 7–15)
BUN SERPL-MCNC: 22.8 MG/DL (ref 8–23)
CALCIUM SERPL-MCNC: 9.1 MG/DL (ref 8.8–10.2)
CHLORIDE SERPL-SCNC: 108 MMOL/L (ref 98–107)
CHOLEST SERPL-MCNC: 162 MG/DL
CREAT SERPL-MCNC: 1.2 MG/DL (ref 0.67–1.17)
DEPRECATED HCO3 PLAS-SCNC: 20 MMOL/L (ref 22–29)
GFR SERPL CREATININE-BSD FRML MDRD: 68 ML/MIN/1.73M2
GLUCOSE SERPL-MCNC: 110 MG/DL (ref 70–99)
HBA1C MFR BLD: 6.2 % (ref 0–5.6)
HDLC SERPL-MCNC: 35 MG/DL
INR BLD: 2.8 (ref 0.9–1.1)
LDLC SERPL CALC-MCNC: 108 MG/DL
NONHDLC SERPL-MCNC: 127 MG/DL
PHOSPHATE SERPL-MCNC: 2.7 MG/DL (ref 2.5–4.5)
POTASSIUM SERPL-SCNC: 4.6 MMOL/L (ref 3.4–5.3)
PSA SERPL DL<=0.01 NG/ML-MCNC: 0.69 NG/ML (ref 0–4.5)
SODIUM SERPL-SCNC: 139 MMOL/L (ref 136–145)
TRIGL SERPL-MCNC: 93 MG/DL

## 2023-06-21 PROCEDURE — 83036 HEMOGLOBIN GLYCOSYLATED A1C: CPT | Performed by: PHYSICIAN ASSISTANT

## 2023-06-21 PROCEDURE — 80061 LIPID PANEL: CPT | Performed by: PHYSICIAN ASSISTANT

## 2023-06-21 PROCEDURE — 99214 OFFICE O/P EST MOD 30 MIN: CPT | Performed by: PHYSICIAN ASSISTANT

## 2023-06-21 PROCEDURE — 36415 COLL VENOUS BLD VENIPUNCTURE: CPT | Performed by: PHYSICIAN ASSISTANT

## 2023-06-21 PROCEDURE — 85610 PROTHROMBIN TIME: CPT | Performed by: PHYSICIAN ASSISTANT

## 2023-06-21 PROCEDURE — G0103 PSA SCREENING: HCPCS | Performed by: PHYSICIAN ASSISTANT

## 2023-06-21 PROCEDURE — 80069 RENAL FUNCTION PANEL: CPT | Performed by: PHYSICIAN ASSISTANT

## 2023-06-21 ASSESSMENT — PAIN SCALES - GENERAL: PAINLEVEL: NO PAIN (0)

## 2023-06-21 NOTE — PROGRESS NOTES
ANTICOAGULATION MANAGEMENT     Owen Sahni 62 year old male is on warfarin with therapeutic INR result. (Goal INR 2.0-3.0)    Recent labs: (last 7 days)     06/21/23  1052   INR 2.8*       ASSESSMENT       Source(s): Chart Review and Patient/Caregiver Call       Warfarin doses taken: Warfarin taken as instructed    Diet: No new diet changes identified    Medication/supplement changes: None noted    New illness, injury, or hospitalization: No    Signs or symptoms of bleeding or clotting: No    Previous result: Therapeutic last 2(+) visits    Additional findings: Previous INR was 5/4/23         PLAN     Recommended plan for no diet, medication or health factor changes affecting INR     Dosing Instructions: Continue your current warfarin dose with next INR in 6 weeks       Summary  As of 6/21/2023    Full warfarin instructions:  5 mg every Mon, Wed, Fri; 7.5 mg all other days   Next INR check:  8/2/2023             Telephone call with Collin who verbalizes understanding and agrees to plan    Lab visit scheduled    Education provided:     Contact [phone]  with any changes, questions or concerns.     Plan made per [facility] anticoagulation protocol    Laney Ying RN  Anticoagulation Clinic  6/21/2023    _______________________________________________________________________     Anticoagulation Episode Summary     Current INR goal:  2.0-3.0   TTR:  88.3 % (9.4 mo)   Target end date:  Indefinite   Send INR reminders to:  [facility]    Indications    Atrial fibrillation (H) [I48.91]  Long term (current) use of anticoagulants [Z79.01]  Atrial fibrillation  unspecified type (H) [I48.91]           Comments:           Anticoagulation Care Providers     Provider Role Specialty Phone number    Evelio Oliver MD Referring Cardiovascular Disease [phone]    Braulio Coronel MD Referring Family Medicine [phone]

## 2023-07-02 DIAGNOSIS — I10 ESSENTIAL HYPERTENSION WITH GOAL BLOOD PRESSURE LESS THAN 140/90: ICD-10-CM

## 2023-07-02 DIAGNOSIS — I48.91 ATRIAL FIBRILLATION, UNSPECIFIED TYPE (H): ICD-10-CM

## 2023-07-02 DIAGNOSIS — I42.1 HYPERTROPHIC OBSTRUCTIVE CARDIOMYOPATHY (H): ICD-10-CM

## 2023-07-05 RX ORDER — LOSARTAN POTASSIUM 100 MG/1
TABLET ORAL
Qty: 90 TABLET | Refills: 0 | Status: SHIPPED | OUTPATIENT
Start: 2023-07-05 | End: 2023-09-28

## 2023-07-05 RX ORDER — WARFARIN SODIUM 5 MG/1
TABLET ORAL
Qty: 114 TABLET | Refills: 1 | Status: SHIPPED | OUTPATIENT
Start: 2023-07-05 | End: 2023-12-27

## 2023-08-11 ENCOUNTER — ANTICOAGULATION THERAPY VISIT (OUTPATIENT)
Dept: ANTICOAGULATION | Facility: CLINIC | Age: 63
End: 2023-08-11

## 2023-08-11 ENCOUNTER — TELEPHONE (OUTPATIENT)
Dept: ANTICOAGULATION | Facility: CLINIC | Age: 63
End: 2023-08-11

## 2023-08-11 ENCOUNTER — LAB (OUTPATIENT)
Dept: LAB | Facility: CLINIC | Age: 63
End: 2023-08-11
Payer: COMMERCIAL

## 2023-08-11 DIAGNOSIS — Z79.01 LONG TERM (CURRENT) USE OF ANTICOAGULANTS: ICD-10-CM

## 2023-08-11 DIAGNOSIS — I48.91 ATRIAL FIBRILLATION, UNSPECIFIED TYPE (H): ICD-10-CM

## 2023-08-11 DIAGNOSIS — I48.91 ATRIAL FIBRILLATION, UNSPECIFIED TYPE (H): Primary | ICD-10-CM

## 2023-08-11 LAB — INR BLD: 2.1 (ref 0.9–1.1)

## 2023-08-11 PROCEDURE — 85610 PROTHROMBIN TIME: CPT

## 2023-08-11 PROCEDURE — 36416 COLLJ CAPILLARY BLOOD SPEC: CPT

## 2023-08-11 NOTE — PROGRESS NOTES
ANTICOAGULATION MANAGEMENT     Owen Sahni 63 year old male is on warfarin with therapeutic INR result. (Goal INR 2.0-3.0)    Recent labs: (last 7 days)     08/11/23  1458   INR 2.1*       ASSESSMENT     Source(s): Chart Review and Patient/Caregiver Call     Warfarin doses taken: Warfarin taken as instructed  Diet: No new diet changes identified  Medication/supplement changes: None noted  New illness, injury, or hospitalization: No  Signs or symptoms of bleeding or clotting: No  Previous result: Therapeutic last 2(+) visits  Additional findings: None       PLAN     Recommended plan for no diet, medication or health factor changes affecting INR     Dosing Instructions: Continue your current warfarin dose with next INR in 6 weeks       Summary  As of 8/11/2023      Full warfarin instructions:  5 mg every Mon, Wed, Fri; 7.5 mg all other days   Next INR check:  9/22/2023               Telephone call with Collin who verbalizes understanding and agrees to plan and who agrees to plan and repeated back plan correctly    Lab visit scheduled    Education provided:   Please call back if any changes to your diet, medications or how you've been taking warfarin    Plan made per [facility] anticoagulation protocol    Aman Armenta, RN  Anticoagulation Clinic  8/11/2023    _______________________________________________________________________     Anticoagulation Episode Summary       Current INR goal:  2.0-3.0   TTR:  90.1 % (11.1 mo)   Target end date:  Indefinite   Send INR reminders to:  [facility]    Indications    Atrial fibrillation (H) [I48.91]  Long term (current) use of anticoagulants [Z79.01]  Atrial fibrillation  unspecified type (H) [I48.91]             Comments:               Anticoagulation Care Providers       Provider Role Specialty Phone number    Evelio Oliver MD Referring Cardiovascular Disease [phone]    Braulio Coronel MD Referring Family Medicine [phone]

## 2023-08-11 NOTE — TELEPHONE ENCOUNTER
ANTICOAGULATION CLINIC REFERRAL RENEWAL REQUEST       An annual renewal order is required for all patients referred to [facility].?  Please review and sign the pended referral order for Owen Sahni.       ANTICOAGULATION SUMMARY      Warfarin indication(s)   Atrial Fibrillation    Mechanical heart valve present?  NO       Current goal range   INR: 2.0-3.0     Goal appropriate for indication? Goal INR 2-3, standard for indication(s) above     Time in Therapeutic Range (TTR)  (Goal > 60%) 90.1%       Office visit with referring provider's group within last year yes on 6/21/23       Aman Armenta RN  [facility]

## 2023-08-15 DIAGNOSIS — I48.91 A-FIB (H): Primary | ICD-10-CM

## 2023-08-16 ENCOUNTER — TELEPHONE (OUTPATIENT)
Dept: FAMILY MEDICINE | Facility: CLINIC | Age: 63
End: 2023-08-16
Payer: COMMERCIAL

## 2023-08-16 NOTE — TELEPHONE ENCOUNTER
Patient Quality Outreach    Patient is due for the following:       Topic Date Due    Zoster (Shingles) Vaccine (2 of 2) 08/24/2021    COVID-19 Vaccine (4 - Pfizer series) 12/25/2021    Diptheria Tetanus Pertussis (DTAP/TDAP/TD) Vaccine (2 - Td or Tdap) 04/02/2022    Pneumococcal Vaccine (2 - PCV) 07/08/2022       Next Steps:   Patient has upcoming appointment, these items will be addressed at that time.    Type of outreach:    Chart review performed, no outreach needed.    Next Steps:  Reach out within 90 days via  none .    Max number of attempts reached: Yes. Will try again in 90 days if patient still on fail list.    Questions for provider review:    None           Giuseppe Chin MA

## 2023-09-18 DIAGNOSIS — R73.03 PREDIABETES: ICD-10-CM

## 2023-09-22 ENCOUNTER — OFFICE VISIT (OUTPATIENT)
Dept: URGENT CARE | Facility: URGENT CARE | Age: 63
End: 2023-09-22
Payer: COMMERCIAL

## 2023-09-22 ENCOUNTER — NURSE TRIAGE (OUTPATIENT)
Dept: FAMILY MEDICINE | Facility: CLINIC | Age: 63
End: 2023-09-22

## 2023-09-22 VITALS
RESPIRATION RATE: 28 BRPM | SYSTOLIC BLOOD PRESSURE: 171 MMHG | WEIGHT: 315 LBS | TEMPERATURE: 102.6 F | OXYGEN SATURATION: 96 % | BODY MASS INDEX: 44.24 KG/M2 | HEART RATE: 107 BPM | DIASTOLIC BLOOD PRESSURE: 122 MMHG

## 2023-09-22 DIAGNOSIS — R50.9 FEVER, UNSPECIFIED FEVER CAUSE: Primary | ICD-10-CM

## 2023-09-22 DIAGNOSIS — R03.0 ELEVATED BLOOD PRESSURE READING: ICD-10-CM

## 2023-09-22 DIAGNOSIS — R05.1 ACUTE COUGH: ICD-10-CM

## 2023-09-22 PROCEDURE — 99207 PR NO CHARGE LOS: CPT | Performed by: PHYSICIAN ASSISTANT

## 2023-09-22 NOTE — TELEPHONE ENCOUNTER
"Patient called to clinic with sudden symptoms this morning. Notes \"I never call the doctor\".   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 Notes it was \"cereal bowls full\" of phlegm and vomit.  Patient noted that he then had to urinate and saw that his urine was coffee-colored.   Patient notes he has felt \"foggy\" headed since this morning, more so earlier after vomiting.  Denies fever, chills. Has no pain anywhere in body but reports feeling more weak and tired overall today.  Currently on Warfarin. Takes meds for BP. Unsure of what BP is, does not check at home.   No other urinary symptoms, no pain, dysuria, increased frequency, hesitation, odor.   Symptoms started suddenly, has not been feeling ill or sick recently.  Feels okay at this time, is currently working and driving his truck.    Due to excessive coughing this morning with lots of copious phlegm with vomiting and sudden change in urine, reporting coffee-colored urine, patient taking Coumadin, patient was advised to seek evaluation in nearest ER. Not necessarily reporting diffuse, all over body pain or muscle pains, however is reporting weakness and increased fatigue so felt ER was best entity for proper evaluation.  Patient did agree with this plan and will proceed to a [facility], said would go to [facility]. Agreed to head there now, agreed to pull over and call 911 with any progressing or worsening symptoms.      Bibi Sanchez RN  Clinical Triage/Primary Care  [facility]      Reason for Disposition   Taking Coumadin (warfarin) or other strong blood thinner, or known bleeding disorder (e.g., thrombocytopenia)   Diffuse (all over) muscle pains in the shoulders, arms, legs, and back and dark (cola or tea-colored) or red-colored urine    Additional " "Information   Negative: Shock suspected (e.g., cold/pale/clammy skin, too weak to stand, low BP, rapid pulse)   Negative: Sounds like a life-threatening emergency to the triager   Negative: Urinary catheter, questions about   Negative: Recent back or abdominal injury   Negative: Recent genital injury   Negative: Unable to urinate (or only a few drops) > 4 hours and bladder feels very full (e.g., palpable bladder or strong urge to urinate)   Negative: Passing pure blood or large blood clots (i.e., larger than a dime or grape)    Answer Assessment - Initial Assessment Questions  1. COLOR of URINE: \"Describe the color of the urine.\"  (e.g., tea-colored, pink, red, bloody) \"Do you have blood clots in your urine?\" (e.g., none, pea, grape, small coin)      Dark, coffee ground looking urine  2. ONSET: \"When did the bleeding start?\"       today  3. EPISODES: \"How many times has there been blood in the urine?\" or \"How many times today?\"      One  4. PAIN with URINATION: \"Is there any pain with passing your urine?\" If Yes, ask: \"How bad is the pain?\"  (Scale 1-10; or mild, moderate, severe)     - MILD: Complains slightly about urination hurting.     - MODERATE: Interferes with normal activities.       - SEVERE: Excruciating, unwilling or unable to urinate because of the pain.       None  5. FEVER: \"Do you have a fever?\" If Yes, ask: \"What is your temperature, how was it measured, and when did it start?\"      No fever  6. ASSOCIATED SYMPTOMS: \"Are you passing urine more frequently than usual?\"      Patient did have episode of coughing and vomiting this morning, vomited copious amounts of clear phlegm.   7. OTHER SYMPTOMS: \"Do you have any other symptoms?\" (e.g., back/flank pain, abdomen pain, vomiting)      Felt foggy headed and slightly disoriented this morning. Feeling more tired and weak today.   8. PREGNANCY: \"Is there any chance you are pregnant?\" \"When was your last menstrual period?\"      N/A    Protocols used: Urine - " Blood In-A-OH

## 2023-09-22 NOTE — PROGRESS NOTES
Patient is presenting with cough, fever, elevated blood pressure and dark urine output.  Patient called earlier and was directed to ED, but came here instead.  Patient has a significant cardiac history.    Upon discussion with patient, able to talk in complete sentence, but appears slightly short of breath.      Patient recommended ED evaluation and treatment.  He left in stable condition.

## 2023-09-28 DIAGNOSIS — I10 ESSENTIAL HYPERTENSION WITH GOAL BLOOD PRESSURE LESS THAN 140/90: ICD-10-CM

## 2023-09-28 DIAGNOSIS — I42.1 HYPERTROPHIC OBSTRUCTIVE CARDIOMYOPATHY (H): ICD-10-CM

## 2023-09-28 RX ORDER — LOSARTAN POTASSIUM 100 MG/1
100 TABLET ORAL DAILY
Qty: 90 TABLET | Refills: 0 | Status: SHIPPED | OUTPATIENT
Start: 2023-09-28 | End: 2024-01-02

## 2023-10-02 ENCOUNTER — ANTICOAGULATION THERAPY VISIT (OUTPATIENT)
Dept: ANTICOAGULATION | Facility: CLINIC | Age: 63
End: 2023-10-02

## 2023-10-02 ENCOUNTER — LAB (OUTPATIENT)
Dept: LAB | Facility: CLINIC | Age: 63
End: 2023-10-02
Payer: COMMERCIAL

## 2023-10-02 DIAGNOSIS — I48.91 ATRIAL FIBRILLATION, UNSPECIFIED TYPE (H): Primary | ICD-10-CM

## 2023-10-02 DIAGNOSIS — Z79.01 LONG TERM (CURRENT) USE OF ANTICOAGULANTS: ICD-10-CM

## 2023-10-02 DIAGNOSIS — I48.91 A-FIB (H): ICD-10-CM

## 2023-10-02 LAB — INR BLD: 2.4 (ref 0.9–1.1)

## 2023-10-02 PROCEDURE — 36415 COLL VENOUS BLD VENIPUNCTURE: CPT

## 2023-10-02 PROCEDURE — 85610 PROTHROMBIN TIME: CPT

## 2023-10-02 NOTE — PROGRESS NOTES
ANTICOAGULATION MANAGEMENT     Owen Sahni 63 year old male is on warfarin with therapeutic INR result. (Goal INR 2.0-3.0)    Recent labs: (last 7 days)     10/02/23  1527   INR 2.4*       ASSESSMENT     Source(s): Chart Review and Patient/Caregiver Call     Warfarin doses taken: Warfarin taken as instructed  Diet: No new diet changes identified  Medication/supplement changes: None noted  New illness, injury, or hospitalization: Yes: diagnosed with COVID and seen in the ER on 9/22/23, symptoms have resolved   Signs or symptoms of bleeding or clotting: No  Previous result: Therapeutic last 2(+) visits  Additional findings: None       PLAN     Recommended plan for no diet, medication or health factor changes affecting INR     Dosing Instructions: Continue your current warfarin dose with next INR in 6 weeks       Summary  As of 10/2/2023      Full warfarin instructions:  5 mg every Mon, Wed, Fri; 7.5 mg all other days   Next INR check:  11/13/2023               Telephone call with Collin who verbalizes understanding and agrees to plan    Lab visit scheduled    Education provided:   Contact [phone]  with any changes, questions or concerns.     Plan made per [facility] anticoagulation protocol    Ene Braga RN  Anticoagulation Clinic  10/2/2023    _______________________________________________________________________     Anticoagulation Episode Summary       Current INR goal:  2.0-3.0   TTR:  91.9 % (1 y)   Target end date:  Indefinite   Send INR reminders to:  [facility]    Indications    Atrial fibrillation (H) [I48.91]  Long term (current) use of anticoagulants [Z79.01]  Atrial fibrillation  unspecified type (H) [I48.91]             Comments:               Anticoagulation Care Providers       Provider Role Specialty Phone number    Evelio Oliver MD Referring Cardiovascular Disease [phone]    Braulio Coronel MD Referring Family Medicine [phone]    Grady Duncan PA-C Referring Physician  Assistant - Medical [phone]

## 2023-11-14 ENCOUNTER — LAB (OUTPATIENT)
Dept: LAB | Facility: CLINIC | Age: 63
End: 2023-11-14
Payer: COMMERCIAL

## 2023-11-14 ENCOUNTER — ANTICOAGULATION THERAPY VISIT (OUTPATIENT)
Dept: ANTICOAGULATION | Facility: CLINIC | Age: 63
End: 2023-11-14

## 2023-11-14 ENCOUNTER — TELEPHONE (OUTPATIENT)
Dept: ANTICOAGULATION | Facility: CLINIC | Age: 63
End: 2023-11-14

## 2023-11-14 DIAGNOSIS — I48.91 ATRIAL FIBRILLATION, UNSPECIFIED TYPE (H): Primary | ICD-10-CM

## 2023-11-14 DIAGNOSIS — I48.91 A-FIB (H): ICD-10-CM

## 2023-11-14 DIAGNOSIS — Z79.01 LONG TERM (CURRENT) USE OF ANTICOAGULANTS: ICD-10-CM

## 2023-11-14 LAB — INR BLD: 2.3 (ref 0.9–1.1)

## 2023-11-14 PROCEDURE — 36416 COLLJ CAPILLARY BLOOD SPEC: CPT

## 2023-11-14 PROCEDURE — 85610 PROTHROMBIN TIME: CPT

## 2023-11-14 NOTE — PROGRESS NOTES
ANTICOAGULATION MANAGEMENT     Owen Sahni 63 year old male is on warfarin with therapeutic INR result. (Goal INR 2.0-3.0)    Recent labs: (last 7 days)     11/14/23  1537   INR 2.3*       ASSESSMENT     Source(s): Chart Review and Patient/Caregiver Call     Warfarin doses taken: Warfarin taken as instructed  Diet: No new diet changes identified  Medication/supplement changes: None noted  New illness, injury, or hospitalization: No  Signs or symptoms of bleeding or clotting: No  Previous result: Therapeutic last 2(+) visits  Additional findings: None message sent to PCP to extend INR checks, patient has been in range x one year        PLAN     Recommended plan for no diet, medication or health factor changes affecting INR     Dosing Instructions: Continue your current warfarin dose with next INR in 8 weeks       Summary  As of 11/14/2023      Full warfarin instructions:  5 mg every Mon, Wed, Fri; 7.5 mg all other days   Next INR check:  1/9/2024               Telephone call with Collin who verbalizes understanding and agrees to plan    Lab visit scheduled      Education provided:   Contact [phone]  with any changes, questions or concerns.     Plan made per [facility] anticoagulation protocol    Ene Braga RN  Anticoagulation Clinic  11/14/2023    _______________________________________________________________________     Anticoagulation Episode Summary       Current INR goal:  2.0-3.0   TTR:  100.0% (1 y)   Target end date:  Indefinite   Send INR reminders to:  [facility]    Indications    Atrial fibrillation (H) [I48.91]  Long term (current) use of anticoagulants [Z79.01]  Atrial fibrillation  unspecified type (H) [I48.91]             Comments:               Anticoagulation Care Providers       Provider Role Specialty Phone number    Evelio Oliver MD Referring Cardiovascular Disease [phone]    Braulio Coronel MD Referring Family Medicine [phone]    Grayd Duncan PA-C Referring Physician  Assistant - Medical [phone]

## 2023-11-15 NOTE — TELEPHONE ENCOUNTER
Anticoagulation-Extended Recheck Request    Under [facility] Anticoagulation protocol, Anticoagulation team may extend INR recheck interval + 1 week for each stable in range INR to max of 6 weeks. Extended interval rechecks between 8-12 weeks for patients on stable maintenance therapy require approval from referring provider.    Anticoagulation clinic suggests consideration of extended intervals in medically stable patients, with standard INR goals, and no change in warfarin dose for last 4-6 months    Owen Sahni, 63 year old, male has been on:    Current recheck interval: 6 week  Compliant: Yes  Stable warfarin dose since: 11/2022  INR Goal: 2.0-3.0  Time in Therapeutic range: 100%    Lab Results   Component Value Date    INR 2.3 11/14/2023    INR 2.4 10/02/2023    INR 2.1 08/11/2023    INR 2.8 06/21/2023    INR 2.9 05/04/2023    INR 2.3 03/23/2023    INR 2.0 02/01/2023    INR 2.7 12/26/2022    INR 1.72 01/27/2022    INR 2.10 07/08/2021    INR 3.00 06/23/2021    INR 2.80 05/26/2021    INR 1.60 05/12/2021    INR 1.70 04/16/2021    INR 1.80 04/13/2021    INR 1.40 03/11/2021    INR 1.80 02/12/2021         Please advise if Owen is approved to have extended interval rechecks every 8-12 weeks, extending + 2 weeks after each additional therapeutic result to max of 12 weeks while on stable maintenance therapy    Thank you,    Ene Braga RN    
Ok to extend INR checks 8-12 weeks assuming continued normal INR results.     ROXY MUSE PA-C     Noted and next INR has been scheduled.    Ene Braga RN  [facility]ticoagulation [facility]     
WDL

## 2023-12-27 DIAGNOSIS — I48.91 ATRIAL FIBRILLATION, UNSPECIFIED TYPE (H): ICD-10-CM

## 2023-12-27 RX ORDER — WARFARIN SODIUM 5 MG/1
TABLET ORAL
Qty: 114 TABLET | Refills: 1 | Status: SHIPPED | OUTPATIENT
Start: 2023-12-27 | End: 2024-06-25

## 2024-01-02 DIAGNOSIS — I10 ESSENTIAL HYPERTENSION WITH GOAL BLOOD PRESSURE LESS THAN 140/90: ICD-10-CM

## 2024-01-02 DIAGNOSIS — I42.1 HYPERTROPHIC OBSTRUCTIVE CARDIOMYOPATHY (H): ICD-10-CM

## 2024-01-03 RX ORDER — LOSARTAN POTASSIUM 100 MG/1
100 TABLET ORAL DAILY
Qty: 90 TABLET | Refills: 0 | Status: SHIPPED | OUTPATIENT
Start: 2024-01-03 | End: 2024-04-01

## 2024-01-19 ENCOUNTER — LAB (OUTPATIENT)
Dept: LAB | Facility: CLINIC | Age: 64
End: 2024-01-19
Payer: COMMERCIAL

## 2024-01-19 ENCOUNTER — ANTICOAGULATION THERAPY VISIT (OUTPATIENT)
Dept: ANTICOAGULATION | Facility: CLINIC | Age: 64
End: 2024-01-19

## 2024-01-19 DIAGNOSIS — I48.91 ATRIAL FIBRILLATION, UNSPECIFIED TYPE (H): ICD-10-CM

## 2024-01-19 DIAGNOSIS — I48.91 A-FIB (H): ICD-10-CM

## 2024-01-19 DIAGNOSIS — I48.91 ATRIAL FIBRILLATION (H): Primary | ICD-10-CM

## 2024-01-19 DIAGNOSIS — Z79.01 LONG TERM (CURRENT) USE OF ANTICOAGULANTS: ICD-10-CM

## 2024-01-19 LAB — INR BLD: 2 (ref 0.9–1.1)

## 2024-01-19 PROCEDURE — 85610 PROTHROMBIN TIME: CPT

## 2024-01-19 PROCEDURE — 36416 COLLJ CAPILLARY BLOOD SPEC: CPT

## 2024-01-19 NOTE — PROGRESS NOTES
ANTICOAGULATION MANAGEMENT     Owen Sahni 63 year old male is on warfarin with therapeutic INR result. (Goal INR 2.0-3.0)    Recent labs: (last 7 days)     01/19/24  1315   INR 2.0*       ASSESSMENT     Source(s): Chart Review and Patient/Caregiver Call     Warfarin doses taken: Warfarin taken as instructed  Diet: No new diet changes identified  Medication/supplement changes: None noted  New illness, injury, or hospitalization: No  Signs or symptoms of bleeding or clotting: No  Previous result: Therapeutic last 2(+) visits  Additional findings: None       PLAN     Recommended plan for no diet, medication or health factor changes affecting INR     Dosing Instructions: Continue your current warfarin dose with next INR in 10 weeks       Summary  As of 1/19/2024      Full warfarin instructions:  5 mg every Mon, Wed, Fri; 7.5 mg all other days   Next INR check:  3/29/2024               Telephone call with Collin who verbalizes understanding and agrees to plan    Lab visit scheduled    Education provided:   Contact [phone]  with any changes, questions or concerns.     Plan made per [facility] anticoagulation protocol    Torrie Farias RN  Anticoagulation Clinic  1/19/2024    _______________________________________________________________________     Anticoagulation Episode Summary       Current INR goal:  2.0-3.0   TTR:  100.0% (1 y)   Target end date:  Indefinite   Send INR reminders to:  [facility]    Indications    Atrial fibrillation (H) [I48.91]  Long term (current) use of anticoagulants [Z79.01]  Atrial fibrillation  unspecified type (H) [I48.91]             Comments:  okay given for 8-12 week checks             Anticoagulation Care Providers       Provider Role Specialty Phone number    Evelio Oliver MD Referring Cardiovascular Disease [phone]    Braulio Coronel MD Referring Family Medicine [phone]    Grady Duncan PA-C Referring Physician Assistant - Medical [phone]

## 2024-02-09 NOTE — PATIENT INSTRUCTIONS
Smn received from provider.   Faxed to [facility] for new start services:  02/09/2024 858am cover plus 5 pages   Faxed to [facility] for chart updates: 02/09/2024 900am cover plus 5 pages    Vinegar soak for scaly lesions       Mix 1 tbsp of vinegar and 8 ounces of water. Soak a wash cloth or some gauze with this solution and hold it on affected areas for 15 minutes, once or twice a day.         [facility]  Jass John DO      Pre-Surgical Instruction Sheet    1. Expect to be here majority of the morning.  The Mohs surgical procedure can be an extensive surgery requiring multiple stages. Each stage may take 1-2 hours.    ? You may eat breakfast  ? You may bring snacks or beverages with you  ? Take all normal medications morning of surgery -- unless otherwise indicated by your physician  ? If you have an artificial heart valve, or joint replacement within two years, we will prescribe an antibiotic. Please take one hour prior to surgery.    2. You will need a  to be with you if your surgical site is close to your eyes. You can get swelling/bruising immediately after surgery and will go home with a big bulky bandage that could obstruct your view of driving safely.    3. Wear comfortable clothing -- preferably a button down shirt or a loose fitted shirt. (to avoid pulling over pressure bandage off when you get home) If your surgery site is on your leg, try wearing loose fitted pants. If your surgery site is on your arm, try wearing a short-sleeve shirt.    4. Bring reading material or other items to help pass time. We do have internet access available if you own a laptop, iPad, etc.)    5. Women - do NOT wear make-up. We will be washing it off anyone needing surgery on the face    6. Do NOT stop blood thinning medication unless instructed to do so by your surgeon. This will include: Warfarin, Coumadin, Jantoven, Aspirin, Plavix and Pradaxa. If you are taking Warfarin or Coumadin, please have your INR blood test results sent to our office no more than 7 days prior to your surgery.     7. Tylenol is a good alternative to take for headaches and pain, and can be taken  throughout your surgery and postoperative recovery.    8. If your surgery is on your trunk, arms, hands, legs, feet, fingernail or a toenail, please wash the area with Hibiclens, an over-the-counter antiseptic soap, the night before and morning of your surgery.     If you have any questions, please feel free to contact us.    Phone numbers:  During business hours (M-F 8:00-4:30 p.m.)  [facility]:  [phone] - select option 1 for appt. desk  [phone] - select option 3 for nurse triage line  [facility]:   [phone] - goes straight to call center   ---------------------------------------------------------  Evenings/Weekends/Holidays  Hospital - [phone]   TTY for hearing bfrtupjz-[phone]  *Ask  to page dermatologist on-call  Emergency Effg-[phone]  TTY for hearing impaired- [phone]

## 2024-03-28 ENCOUNTER — LAB (OUTPATIENT)
Dept: LAB | Facility: CLINIC | Age: 64
End: 2024-03-28
Payer: COMMERCIAL

## 2024-03-28 ENCOUNTER — ANTICOAGULATION THERAPY VISIT (OUTPATIENT)
Dept: ANTICOAGULATION | Facility: CLINIC | Age: 64
End: 2024-03-28

## 2024-03-28 DIAGNOSIS — I48.91 ATRIAL FIBRILLATION, UNSPECIFIED TYPE (H): ICD-10-CM

## 2024-03-28 DIAGNOSIS — Z79.01 LONG TERM (CURRENT) USE OF ANTICOAGULANTS: ICD-10-CM

## 2024-03-28 DIAGNOSIS — I48.91 ATRIAL FIBRILLATION (H): Primary | ICD-10-CM

## 2024-03-28 DIAGNOSIS — I48.91 A-FIB (H): ICD-10-CM

## 2024-03-28 LAB — INR BLD: 3.1 (ref 0.9–1.1)

## 2024-03-28 PROCEDURE — 85610 PROTHROMBIN TIME: CPT

## 2024-03-28 PROCEDURE — 36416 COLLJ CAPILLARY BLOOD SPEC: CPT

## 2024-03-28 NOTE — PROGRESS NOTES
ANTICOAGULATION MANAGEMENT     Owen Sahni 63 year old male is on warfarin with supratherapeutic INR result. (Goal INR 2.0-3.0)    Recent labs: (last 7 days)     03/28/24  1140   INR 3.1*       ASSESSMENT     Source(s): Chart Review and Patient/Caregiver Call     Warfarin doses taken: Warfarin taken as instructed  Diet: No new diet changes identified  Medication/supplement changes: None noted  New illness, injury, or hospitalization: No  Signs or symptoms of bleeding or clotting: No  Previous result: Therapeutic last 2(+) visits  Additional findings: None       PLAN     Recommended plan for no diet, medication or health factor changes affecting INR     Dosing Instructions: Continue your current warfarin dose with next INR in 2 weeks   suggested will come in 5 weeks.    Summary  As of 3/28/2024      Full warfarin instructions:  5 mg every Mon, Wed, Fri; 7.5 mg all other days   Next INR check:                 Telephone call with Collin who verbalizes understanding and agrees to plan    Lab visit scheduled    Education provided:   Please call back if any changes to your diet, medications or how you've been taking warfarin  Symptom monitoring: monitoring for bleeding signs and symptoms    Plan made per [facility] anticoagulation protocol    Danielle Meza, RN  Anticoagulation Clinic  3/28/2024    _______________________________________________________________________     Anticoagulation Episode Summary       Current INR goal:  2.0-3.0   TTR:  98.3% (1 y)   Target end date:  Indefinite   Send INR reminders to:  [facility]    Indications    Atrial fibrillation (H) [I48.91]  Long term (current) use of anticoagulants [Z79.01]  Atrial fibrillation  unspecified type (H) [I48.91]             Comments:  okay given for 8-12 week checks             Anticoagulation Care Providers       Provider Role Specialty Phone number    Evelio Oliver MD Referring Cardiovascular Disease [phone]    Braulio Coronel MD Referring Family  Medicine [phone]    Grady Duncan PA-C Referring Physician Assistant - Medical [phone]

## 2024-04-01 DIAGNOSIS — I10 ESSENTIAL HYPERTENSION WITH GOAL BLOOD PRESSURE LESS THAN 140/90: ICD-10-CM

## 2024-04-01 DIAGNOSIS — I42.1 HYPERTROPHIC OBSTRUCTIVE CARDIOMYOPATHY (H): ICD-10-CM

## 2024-04-01 RX ORDER — LOSARTAN POTASSIUM 100 MG/1
100 TABLET ORAL DAILY
Qty: 60 TABLET | Refills: 0 | Status: SHIPPED | OUTPATIENT
Start: 2024-04-01 | End: 2024-04-09

## 2024-04-09 ENCOUNTER — TELEPHONE (OUTPATIENT)
Dept: FAMILY MEDICINE | Facility: CLINIC | Age: 64
End: 2024-04-09
Payer: COMMERCIAL

## 2024-04-09 DIAGNOSIS — I10 ESSENTIAL HYPERTENSION WITH GOAL BLOOD PRESSURE LESS THAN 140/90: ICD-10-CM

## 2024-04-09 DIAGNOSIS — I42.1 HYPERTROPHIC OBSTRUCTIVE CARDIOMYOPATHY (H): ICD-10-CM

## 2024-04-09 RX ORDER — LOSARTAN POTASSIUM 100 MG/1
100 TABLET ORAL DAILY
Qty: 60 TABLET | Refills: 0 | Status: SHIPPED | OUTPATIENT
Start: 2024-04-09 | End: 2024-06-05

## 2024-04-09 NOTE — TELEPHONE ENCOUNTER
Medication Question or Refill    Contacts         Type Contact Phone/Fax    04/09/2024 10:39 AM CDT Phone (Incoming) Collin Sahni ADONIS (Self) [phone] (M)            What medication are you calling about (include dose and sig)?: losartan (COZAAR) 100 MG tablet    Preferred Pharmacy:   [facility]/pharmacy #[id number] - [address] AT CORNER OF [facility]  [address] [facility] [address]  Phone: [phone] Fax: [phone]      Controlled Substance Agreement on file:   CSA -- Patient Level:    CSA: None found at the patient level.       Who prescribed the medication?: Grady Duncan    Do you need a refill? Yes, Patient was given a 60 day refill, but would like 90 day if possible. 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 I told him that would be up to the provider.    When did you use the medication last? Today    Patient offered an appointment? Yes: Declined for now    Do you have any questions or concerns?  Yes: Just wants 90 day refill rather than 60      Okay to leave a detailed message?: Yes at Cell number on file:    Telephone Information:   Mobile [phone]

## 2024-04-10 ENCOUNTER — TELEPHONE (OUTPATIENT)
Dept: FAMILY MEDICINE | Facility: CLINIC | Age: 64
End: 2024-04-10
Payer: COMMERCIAL

## 2024-05-08 ENCOUNTER — ANTICOAGULATION THERAPY VISIT (OUTPATIENT)
Dept: ANTICOAGULATION | Facility: CLINIC | Age: 64
End: 2024-05-08

## 2024-05-08 ENCOUNTER — LAB (OUTPATIENT)
Dept: LAB | Facility: CLINIC | Age: 64
End: 2024-05-08
Payer: COMMERCIAL

## 2024-05-08 DIAGNOSIS — I48.91 ATRIAL FIBRILLATION, UNSPECIFIED TYPE (H): ICD-10-CM

## 2024-05-08 DIAGNOSIS — Z79.01 LONG TERM (CURRENT) USE OF ANTICOAGULANTS: ICD-10-CM

## 2024-05-08 DIAGNOSIS — I48.91 ATRIAL FIBRILLATION (H): Primary | ICD-10-CM

## 2024-05-08 DIAGNOSIS — I48.91 A-FIB (H): ICD-10-CM

## 2024-05-08 LAB — INR BLD: 3 (ref 0.9–1.1)

## 2024-05-08 PROCEDURE — 85610 PROTHROMBIN TIME: CPT

## 2024-05-08 PROCEDURE — 36416 COLLJ CAPILLARY BLOOD SPEC: CPT

## 2024-05-08 NOTE — PROGRESS NOTES
ANTICOAGULATION MANAGEMENT     Owen Sahni 63 year old male is on warfarin with therapeutic INR result. (Goal INR 2.0-3.0)    Recent labs: (last 7 days)     05/08/24  1413   INR 3.0*       ASSESSMENT     Source(s): Chart Review and Patient/Caregiver Call     Warfarin doses taken: Warfarin taken as instructed  Diet: No new diet changes identified  Medication/supplement changes: None noted  New illness, injury, or hospitalization: No  Signs or symptoms of bleeding or clotting: No  Previous result: Supratherapeutic  Additional findings: None       PLAN     Recommended plan for no diet, medication or health factor changes affecting INR     Dosing Instructions: Continue your current warfarin dose with next INR in 6 weeks       Summary  As of 5/8/2024      Full warfarin instructions:  5 mg every Mon, Wed, Fri; 7.5 mg all other days   Next INR check:  6/19/2024               Telephone call with Collin who verbalizes understanding and agrees to plan    Lab visit scheduled    Education provided:   Please call back if any changes to your diet, medications or how you've been taking warfarin    Plan made per [facility] anticoagulation protocol    Danilele Meza RN  Anticoagulation Clinic  5/8/2024    _______________________________________________________________________     Anticoagulation Episode Summary       Current INR goal:  2.0-3.0   TTR:  87.0% (1 y)   Target end date:  Indefinite   Send INR reminders to:  [facility]    Indications    Atrial fibrillation (H) [I48.91]  Long term (current) use of anticoagulants [Z79.01]  Atrial fibrillation  unspecified type (H) [I48.91]             Comments:  okay given for 8-12 week checks             Anticoagulation Care Providers       Provider Role Specialty Phone number    Evelio Oliver MD Referring Cardiovascular Disease [phone]    Braulio Coronel MD Referring Family Medicine [phone]    Grady Duncan PA-C Referring Physician Assistant - Medical [phone]

## 2024-06-05 DIAGNOSIS — I10 ESSENTIAL HYPERTENSION WITH GOAL BLOOD PRESSURE LESS THAN 140/90: ICD-10-CM

## 2024-06-05 DIAGNOSIS — I42.1 HYPERTROPHIC OBSTRUCTIVE CARDIOMYOPATHY (H): ICD-10-CM

## 2024-06-05 RX ORDER — LOSARTAN POTASSIUM 100 MG/1
100 TABLET ORAL DAILY
Qty: 30 TABLET | Refills: 0 | Status: SHIPPED | OUTPATIENT
Start: 2024-06-05 | End: 2024-06-19

## 2024-06-05 NOTE — TELEPHONE ENCOUNTER
Medication Question or Refill    Contacts         Type Contact Phone/Fax    06/05/2024 11:56 AM CDT Phone (Incoming) Collin Sahni (Self) [phone] (M)            What medication are you calling about (include dose and sig)?: losartan (COZAAR) 100 MG tablet 60 tablet 0 4/9/2024 -- No  Sig - Route: Take 1 tablet (100 mg) by mouth daily - Oral    Preferred Pharmacy:   [facility]/pharmacy #[id number] - [facility] [address] [facility]  [address] [facility] [address]  Phone: [phone] Fax: [phone]    Controlled Substance Agreement on file:   CSA -- Patient Level:    CSA: None found at the patient level.       Who prescribed the medication?:     Grady Duncan PA-C       Do you need a refill? Yes    When did you use the medication last? Today    Patient offered an appointment? No    Do you have any questions or concerns?  No      Okay to leave a detailed message?: Yes at Cell number on file:    Telephone Information:   Mobile [phone]

## 2024-06-12 NOTE — PATIENT INSTRUCTIONS
"Patient Education   Preventive Care Advice   This is general advice we often give to help people stay healthy. Your care team may have specific advice just for you. Please talk to your care team about your own preventive care needs.  Lifestyle  Exercise at least 150 minutes each week (30 minutes a day, 5 days a week).  Do muscle strengthening activities 2 days a week. These help control your weight and prevent disease.  No smoking.  Wear sunscreen to prevent skin cancer.  Have your home tested for radon every 2 to 5 years. Radon is a colorless, odorless gas that can harm your lungs. To learn more, go to www.health.[facility].mn.us and search for \"Radon in Homes.\"  Keep guns unloaded and locked up in a safe place like a safe or gun vault, or, use a gun lock and hide the keys. Always lock away bullets separately. To learn more, visit [employer].mn.gov and search for \"safe gun storage.\"  Nutrition  Eat 5 or more servings of fruits and vegetables each day.  Try wheat bread, brown rice and whole grain pasta (instead of white bread, rice, and pasta).  Get enough calcium and vitamin D. Check the label on foods and aim for 100% of the RDA (recommended daily allowance).  Regular exams  Have a dental exam and cleaning every 6 months.  See your health care team every year to talk about:  Any changes in your health.  Any medicines your care team has prescribed.  Preventive care, family planning, and ways to prevent chronic diseases.  Shots (vaccines)   HPV shots (up to age 26), if you've never had them before.  Hepatitis B shots (up to age 59), if you've never had them before.  COVID-19 shot: Get this shot when it's due.  Flu shot: Get a flu shot every year.  Tetanus shot: Get a tetanus shot every 10 years.  Pneumococcal, hepatitis A, and RSV shots: Ask your care team if you need these based on your risk.  Shingles shot (for age 50 and up).  General health tests  Diabetes screening:  Starting at age 35, Get screened for diabetes at least " every 3 years.  If you are younger than age 35, ask your care team if you should be screened for diabetes.  Cholesterol test: At age 39, start having a cholesterol test every 5 years, or more often if advised.  Bone density scan (DEXA): At age 50, ask your care team if you should have this scan for osteoporosis (brittle bones).  Hepatitis C: Get tested at least once in your life.  Abdominal aortic aneurysm screening: Talk to your doctor about having this screening if you:  Have ever smoked; and  Are biologically male; and  Are between the ages of 65 and 75.  STIs (sexually transmitted infections)  Before age 24: Ask your care team if you should be screened for STIs.  After age 24: Get screened for STIs if you're at risk. You are at risk for STIs (including HIV) if:  You are sexually active with more than one person.  You don't use condoms every time.  You or a partner was diagnosed with a sexually transmitted infection.  If you are at risk for HIV, ask about PrEP medicine to prevent HIV.  Get tested for HIV at least once in your life, whether you are at risk for HIV or not.  Cancer screening tests  Cervical cancer screening: If you have a cervix, begin getting regular cervical cancer screening tests at age 21. Most people who have regular screenings with normal results can stop after age 65. Talk about this with your provider.  Breast cancer scan (mammogram): If you've ever had breasts, begin having regular mammograms starting at age 40. This is a scan to check for breast cancer.  Colon cancer screening: It is important to start screening for colon cancer at age 45.  Have a colonoscopy test every 10 years (or more often if you're at risk) Or, ask your provider about stool tests like a FIT test every year or Cologuard test every 3 years.  To learn more about your testing options, visit: www.SocialRep/223525.pdf.  For help making a decision, visit: dionne/yy28733.  Prostate cancer screening test: If you have a  prostate and are age 55 to 69, ask your provider if you would benefit from a yearly prostate cancer screening test.  Lung cancer screening: If you are a current or former smoker age 50 to 80, ask your care team if ongoing lung cancer screenings are right for you.  For informational purposes only. Not to replace the advice of your health care provider. Copyright   2023 [facility]. All rights reserved. Clinically reviewed by the [facility] Transitions Program. [employer]FeeRealEstateSales.com 128726 - REV 04/24.

## 2024-06-19 ENCOUNTER — ANTICOAGULATION THERAPY VISIT (OUTPATIENT)
Dept: ANTICOAGULATION | Facility: CLINIC | Age: 64
End: 2024-06-19

## 2024-06-19 ENCOUNTER — LAB (OUTPATIENT)
Dept: LAB | Facility: CLINIC | Age: 64
End: 2024-06-19
Payer: COMMERCIAL

## 2024-06-19 ENCOUNTER — OFFICE VISIT (OUTPATIENT)
Dept: FAMILY MEDICINE | Facility: CLINIC | Age: 64
End: 2024-06-19
Payer: COMMERCIAL

## 2024-06-19 VITALS
HEIGHT: 72 IN | WEIGHT: 315 LBS | DIASTOLIC BLOOD PRESSURE: 79 MMHG | BODY MASS INDEX: 42.66 KG/M2 | SYSTOLIC BLOOD PRESSURE: 138 MMHG | HEART RATE: 60 BPM | TEMPERATURE: 97.6 F | RESPIRATION RATE: 16 BRPM | OXYGEN SATURATION: 100 %

## 2024-06-19 DIAGNOSIS — I10 ESSENTIAL HYPERTENSION WITH GOAL BLOOD PRESSURE LESS THAN 140/90: ICD-10-CM

## 2024-06-19 DIAGNOSIS — Z00.00 ROUTINE GENERAL MEDICAL EXAMINATION AT A HEALTH CARE FACILITY: Primary | ICD-10-CM

## 2024-06-19 DIAGNOSIS — I42.1 HYPERTROPHIC OBSTRUCTIVE CARDIOMYOPATHY (H): ICD-10-CM

## 2024-06-19 DIAGNOSIS — E66.01 MORBID OBESITY (H): ICD-10-CM

## 2024-06-19 DIAGNOSIS — R73.03 PREDIABETES: ICD-10-CM

## 2024-06-19 DIAGNOSIS — Z79.01 LONG TERM (CURRENT) USE OF ANTICOAGULANTS: ICD-10-CM

## 2024-06-19 DIAGNOSIS — I48.91 ATRIAL FIBRILLATION, UNSPECIFIED TYPE (H): ICD-10-CM

## 2024-06-19 DIAGNOSIS — I48.91 A-FIB (H): ICD-10-CM

## 2024-06-19 DIAGNOSIS — I48.91 ATRIAL FIBRILLATION, UNSPECIFIED TYPE (H): Primary | ICD-10-CM

## 2024-06-19 LAB — INR BLD: 2.2 (ref 0.9–1.1)

## 2024-06-19 PROCEDURE — 85610 PROTHROMBIN TIME: CPT

## 2024-06-19 PROCEDURE — 99214 OFFICE O/P EST MOD 30 MIN: CPT | Mod: 25 | Performed by: PHYSICIAN ASSISTANT

## 2024-06-19 PROCEDURE — 99396 PREV VISIT EST AGE 40-64: CPT | Performed by: PHYSICIAN ASSISTANT

## 2024-06-19 PROCEDURE — 36416 COLLJ CAPILLARY BLOOD SPEC: CPT

## 2024-06-19 RX ORDER — LOSARTAN POTASSIUM 100 MG/1
100 TABLET ORAL DAILY
Qty: 90 TABLET | Refills: 3 | Status: SHIPPED | OUTPATIENT
Start: 2024-06-19

## 2024-06-19 SDOH — HEALTH STABILITY: PHYSICAL HEALTH: ON AVERAGE, HOW MANY DAYS PER WEEK DO YOU ENGAGE IN MODERATE TO STRENUOUS EXERCISE (LIKE A BRISK WALK)?: 5 DAYS

## 2024-06-19 ASSESSMENT — PAIN SCALES - GENERAL: PAINLEVEL: NO PAIN (0)

## 2024-06-19 ASSESSMENT — SOCIAL DETERMINANTS OF HEALTH (SDOH): HOW OFTEN DO YOU GET TOGETHER WITH FRIENDS OR RELATIVES?: THREE TIMES A WEEK

## 2024-06-19 NOTE — PROGRESS NOTES
Preventive Care Visit  [facility]  Grady Duncan PA-C, Physician Assistant - Medical  Jun 19, 2024      Assessment & Plan     Routine general medical examination at a health care facility  Completed  - Lipid panel reflex to direct LDL Fasting; Future  - Comprehensive metabolic panel (BMP + Alb, Alk Phos, ALT, AST, Total. Bili, TP); Future    Atrial fibrillation, unspecified type (H)  Stable, no symptoms.  Continue current meds. No BB due to side effects, low heart rate.     Hypertrophic obstructive cardiomyopathy (H)  Stable   - losartan (COZAAR) 100 MG tablet; Take 1 tablet (100 mg) by mouth daily    Continue without change. Consider addition of med if not to goal next visit. Did get down to goal at end    Morbid obesity (H)  Ongoing, working on diet, exercise level.    Essential hypertension with goal blood pressure less than 140/90  Stable. See above  - losartan (COZAAR) 100 MG tablet; Take 1 tablet (100 mg) by mouth daily    Prediabetes  Refill metformin. Discussed lifestyle   - Hemoglobin A1c; Future  - metFORMIN (GLUCOPHAGE) 500 MG tablet; Take 1 tablet (500 mg) by mouth 2 times daily (with meals)    Patient has been advised of split billing requirements and indicates understanding: Yes        BMI  Estimated body mass index is 42.86 kg/m  as calculated from the following:    Height as of this encounter: 1.829 m (6').    Weight as of this encounter: 143.3 kg (316 lb).   Weight management plan: Discussed healthy diet and exercise guidelines    Counseling  Appropriate preventive services were discussed with this patient, including applicable screening as appropriate for fall prevention, nutrition, physical activity, Tobacco-use cessation, weight loss and cognition.  Checklist reviewing preventive services available has been given to the patient.  Reviewed patient's diet, addressing concerns and/or questions.   The patient reports drinking more than 3 alcoholic drinks per day and/or more  than 7 drhnks per week. The patient was counseled and given information about possible harmful effects of excessive alcohol intake.    Follow up yearly for prevent visit     Onel Polanco is a 63 year old, presenting for the following:  Physical        6/19/2024     3:27 PM   Additional Questions   Roomed by Giuseppe Chin MA   Accompanied by Self        Health Care Directive  Patient does not have a Health Care Directive or Living Will:     HPI        6/19/2024   General Health   How would you rate your overall physical health? Good   Feel stress (tense, anxious, or unable to sleep) Not at all            6/19/2024   Nutrition   Diet: Regular (no restrictions)    Low salt       Multiple values from one day are sorted in reverse-chronological order         6/19/2024   Exercise   Days per week of moderate/strenous exercise 5 days            6/19/2024   Social Factors   Frequency of gathering with friends or relatives Three times a week   Worry food won't last until get money to buy more No   Food not last or not have enough money for food? No   Do you have housing? (Housing is defined as stable permanent housing and does not include staying ouside in a car, in a tent, in an abandoned building, in an overnight shelter, or couch-surfing.) Yes   Are you worried about losing your housing? No   Lack of transportation? No   Unable to get utilities (heat,electricity)? No            6/19/2024   Fall Risk   Fallen 2 or more times in the past year? No   Trouble with walking or balance? No             6/19/2024   Activities of Daily Living- Home Safety   Needs help with the following daily activites None of the above   Safety concerns in the home None of the above            6/19/2024   Dental   Dentist two times every year? Yes            6/19/2024   Hearing Screening   Hearing concerns? None of the above            6/19/2024   Driving Risk Screening   Patient/family members have concerns about driving No            6/19/2024    General Alertness/Fatigue Screening   Have you been more tired than usual lately? No            6/19/2024   Urinary Incontinence Screening   Bothered by leaking urine in past 6 months No            6/19/2024   TB Screening   Were you born outside of the [place]? No            Today's PHQ-2 Score:       6/19/2024     3:35 PM   PHQ-2 ( 1999 Pf[employer])   Q1: Little interest or pleasure in doing things 0   Q2: Feeling down, depressed or hopeless 0   PHQ-2 Score 0   Q1: Little interest or pleasure in doing things Not at all   Q2: Feeling down, depressed or hopeless Not at all   PHQ-2 Score 0           6/19/2024   Substance Use   Alcohol more than 3/day or more than 7/wk Yes   How often do you have a drink containing alcohol 2 to 3 times a week   How many alcohol drinks on typical day 3 or 4   How often do you have 5+ drinks at one occasion Never   Audit 2/3 Score 1   How often not able to stop drinking once started Never   How often failed to do what normally expected Never   How often needed first drink in am after a heavy drinking session Never   How often feeling of guilt or remorse after drinking Never   How often unable to remember what happened the night before Never   Have you or someone else been injured because of your drinking No   Has anyone been concerned or suggested you cut down on drinking No   TOTAL SCORE - AUDIT 4   Do you have a current opioid prescription? No   How severe/bad is pain from 1 to 10? 0/10 (No Pain)   Do you use any other substances recreationally? No        Social History     Tobacco Use    Smoking status: Never    Smokeless tobacco: Never    Tobacco comments:     non-smoking household   Vaping Use    Vaping status: Never Used   Substance Use Topics    Alcohol use: Yes    Drug use: No             6/19/2024   One time HIV Screening   Previous HIV test? No      Last PSA:   PSA   Date Value Ref Range Status   07/08/2021 0.83 0 - 4 ug/L Final     Comment:     Assay Method:  Chemiluminescence using  Siemens Leonard analyzer     Prostate Specific Antigen Screen   Date Value Ref Range Status   06/21/2023 0.69 0.00 - 4.50 ng/mL Final     ASCVD Risk   The 10-year ASCVD risk score (Avery OLEARY, et al., 2019) is: 16.5%    Values used to calculate the score:      Age: 63 years      Sex: Male      Is Non- : No      Diabetic: No      Tobacco smoker: No      Systolic Blood Pressure: 138 mmHg      Is BP treated: Yes      HDL Cholesterol: 35 mg/dL      Total Cholesterol: 162 mg/dL            Reviewed and updated as needed this visit by Provider   Tobacco  Allergies  Meds  Problems  Med Hx  Surg Hx  Fam Hx     Sexual Activity          Past Medical History:   Diagnosis Date    Heart murmur     Hypertension     Hypertrophic cardiomyopathy (H)      Past Surgical History:   Procedure Laterality Date    COLONOSCOPY  4/12/2012    Procedure:COLONOSCOPY; Colonoscopy, screening; Surgeon:ARANZA TEE; Location:[facility] OR    COLONOSCOPY WITH CO2 INSUFFLATION N/A 10/25/2022    Procedure: COLONOSCOPY, WITH CO2 INSUFFLATION;  Surgeon: Shahzad Mai DO;  Location: [facility] OR     Lab work is in process  Labs reviewed in EPIC  Current providers sharing in care for this patient include:  Patient Care Team:  Grady Duncan PA-C as PCP - General (Physician Assistant - Medical)  Grady Duncan PA-C as Assigned PCP    The following health maintenance items are reviewed in Epic and correct as of today:  Health Maintenance   Topic Date Due    HIV SCREENING  Never done    RSV VACCINE (Pregnancy & 60+) (1 - 1-dose 60+ series) Never done    ZOSTER IMMUNIZATION (2 of 2) 08/24/2021    DTAP/TDAP/TD IMMUNIZATION (2 - Td or Tdap) 04/02/2022    Pneumococcal Vaccine: Pediatrics (0 to 5 Years) and At-Risk Patients (6 to 64 Years) (2 of 2 - PCV) 07/08/2022    LIPID  06/21/2024    COVID-19 Vaccine (4 - 2023-24 season) 12/01/2024 (Originally 9/1/2023)    INFLUENZA VACCINE (Season Ended) 09/01/2024    MEDICARE ANNUAL  WELLNESS VISIT  06/19/2025    ANNUAL REVIEW OF HM ORDERS  06/19/2025    GLUCOSE  06/21/2026    ADVANCE CARE PLANNING  07/12/2026    COLORECTAL CANCER SCREENING  10/25/2032    HEPATITIS C SCREENING  Completed    PHQ-2 (once per calendar year)  Completed    IPV IMMUNIZATION  Aged Out    HPV IMMUNIZATION  Aged Out    MENINGITIS IMMUNIZATION  Aged Out    RSV MONOCLONAL ANTIBODY  Aged Out         Review of Systems  Constitutional, neuro, ENT, endocrine, pulmonary, cardiac, gastrointestinal, genitourinary, musculoskeletal, integument and psychiatric systems are negative, except as otherwise noted.     Objective    Exam  /79   Pulse 60   Temp 97.6  F (36.4  C) (Tympanic)   Resp 16   Ht 1.829 m (6')   Wt 143.3 kg (316 lb)   SpO2 100%   BMI 42.86 kg/m     Estimated body mass index is 42.86 kg/m  as calculated from the following:    Height as of this encounter: 1.829 m (6').    Weight as of this encounter: 143.3 kg (316 lb).    Physical Exam  GENERAL: alert and no distress  EYES: Eyes grossly normal to inspection, PERRL and conjunctivae and sclerae normal  HENT: ear canals and TM's normal, nose and mouth without ulcers or lesions  NECK: no adenopathy, no asymmetry, masses, or scars  RESP: lungs clear to auscultation - no rales, rhonchi or wheezes  CV: not currently in a-fib. Systolic murmur present   ABDOMEN: soft, nontender, no hepatosplenomegaly, no masses and bowel sounds normal  MS: no gross musculoskeletal defects noted, no edema  PSYCH: mentation appears normal, affect normal/bright          6/19/2024   Mini Cog   Mini-Cog Not Completed (choose reason) Patient declines      No cognition deficit noted at this time after direcct review    Vision Screen         Signed Electronically by: Grady Duncan PA-C

## 2024-06-19 NOTE — PROGRESS NOTES
ANTICOAGULATION MANAGEMENT     Owen Sahni 63 year old male is on warfarin with therapeutic INR result. (Goal INR 2.0-3.0)    Recent labs: (last 7 days)     06/19/24  1519   INR 2.2*       ASSESSMENT     Source(s): Chart Review and Patient/Caregiver Call     Warfarin doses taken: Warfarin taken as instructed  Diet: No new diet changes identified  Medication/supplement changes: None noted  New illness, injury, or hospitalization: No  Signs or symptoms of bleeding or clotting: No  Previous result: Therapeutic last 2(+) visits  Additional findings: None       PLAN     Recommended plan for no diet, medication or health factor changes affecting INR     Dosing Instructions: Continue your current warfarin dose with next INR in 6 weeks       Summary  As of 6/19/2024      Full warfarin instructions:  5 mg every Mon, Wed, Fri; 7.5 mg all other days   Next INR check:  7/31/2024               Telephone call with Collin who verbalizes understanding and agrees to plan    Lab visit scheduled    Education provided:   Contact [phone]  with any changes, questions or concerns.     Plan made per [facility] anticoagulation protocol    Ene Braga RN  Anticoagulation Clinic  6/19/2024    _______________________________________________________________________     Anticoagulation Episode Summary       Current INR goal:  2.0-3.0   TTR:  87.0% (1 y)   Target end date:  Indefinite   Send INR reminders to:  [facility]    Indications    Atrial fibrillation (H) [I48.91]  Long term (current) use of anticoagulants [Z79.01]  Atrial fibrillation  unspecified type (H) [I48.91]             Comments:  okay given for 8-12 week checks             Anticoagulation Care Providers       Provider Role Specialty Phone number    Evelio Oliver MD Referring Cardiovascular Disease [phone]    Braulio Coronel MD Referring Family Medicine [phone]    Grady Duncan PA-C Referring Physician Assistant - Medical [phone]

## 2024-06-25 DIAGNOSIS — I48.91 ATRIAL FIBRILLATION, UNSPECIFIED TYPE (H): ICD-10-CM

## 2024-06-25 RX ORDER — WARFARIN SODIUM 5 MG/1
TABLET ORAL
Qty: 114 TABLET | Refills: 1 | Status: SHIPPED | OUTPATIENT
Start: 2024-06-25

## 2024-06-25 NOTE — TELEPHONE ENCOUNTER
ANTICOAGULATION MANAGEMENT:  Medication Refill    Anticoagulation Summary  As of 6/19/2024      Warfarin maintenance plan:  5 mg (5 mg x 1) every Mon, Wed, Fri; 7.5 mg (5 mg x 1.5) all other days   Next INR check:  7/31/2024   Target end date:  Indefinite    Indications    Atrial fibrillation (H) [I48.91]  Long term (current) use of anticoagulants [Z79.01]  Atrial fibrillation  unspecified type (H) [I48.91]                 Anticoagulation Care Providers       Provider Role Specialty Phone number    Evelio Oliver MD Referring Cardiovascular Disease [phone]    Braulio Coronel MD Referring Family Medicine [phone]    Grady Duncan PA-C Referring Physician Assistant - Medical [phone]            Refill Criteria    Visit with referring provider/group: Meets criteria: office visit within referring provider group in the last 1 year on 6/19/2024    [facility] referral last signed: 08/11/2023; within last year: Yes    Lab monitoring not exceeding 2 weeks overdue: Yes    Owen meets all criteria for refill. Rx instructions and quantity supplied updated to match patient's current dosing plan. Warfarin 90 day supply with 1 refill granted per [facility] protocol     Danielle Meza RN  Anticoagulation Clinic

## 2024-07-31 ENCOUNTER — TELEPHONE (OUTPATIENT)
Dept: ANTICOAGULATION | Facility: CLINIC | Age: 64
End: 2024-07-31

## 2024-07-31 ENCOUNTER — ANTICOAGULATION THERAPY VISIT (OUTPATIENT)
Dept: ANTICOAGULATION | Facility: CLINIC | Age: 64
End: 2024-07-31

## 2024-07-31 ENCOUNTER — LAB (OUTPATIENT)
Dept: LAB | Facility: CLINIC | Age: 64
End: 2024-07-31
Payer: COMMERCIAL

## 2024-07-31 DIAGNOSIS — Z11.4 SCREENING FOR HIV (HUMAN IMMUNODEFICIENCY VIRUS): Primary | ICD-10-CM

## 2024-07-31 DIAGNOSIS — Z00.00 ROUTINE GENERAL MEDICAL EXAMINATION AT A HEALTH CARE FACILITY: ICD-10-CM

## 2024-07-31 DIAGNOSIS — Z79.01 LONG TERM (CURRENT) USE OF ANTICOAGULANTS: ICD-10-CM

## 2024-07-31 DIAGNOSIS — I48.91 ATRIAL FIBRILLATION, UNSPECIFIED TYPE (H): Primary | ICD-10-CM

## 2024-07-31 DIAGNOSIS — R73.03 PREDIABETES: ICD-10-CM

## 2024-07-31 DIAGNOSIS — I48.91 ATRIAL FIBRILLATION, UNSPECIFIED TYPE (H): ICD-10-CM

## 2024-07-31 DIAGNOSIS — I48.91 A-FIB (H): ICD-10-CM

## 2024-07-31 DIAGNOSIS — I48.91 ATRIAL FIBRILLATION (H): Primary | ICD-10-CM

## 2024-07-31 LAB
HBA1C MFR BLD: 6 % (ref 0–5.6)
INR BLD: 2.8 (ref 0.9–1.1)

## 2024-07-31 PROCEDURE — 83036 HEMOGLOBIN GLYCOSYLATED A1C: CPT

## 2024-07-31 PROCEDURE — 36416 COLLJ CAPILLARY BLOOD SPEC: CPT

## 2024-07-31 PROCEDURE — 80053 COMPREHEN METABOLIC PANEL: CPT

## 2024-07-31 PROCEDURE — 85610 PROTHROMBIN TIME: CPT

## 2024-07-31 PROCEDURE — 36415 COLL VENOUS BLD VENIPUNCTURE: CPT

## 2024-07-31 PROCEDURE — 87389 HIV-1 AG W/HIV-1&-2 AB AG IA: CPT

## 2024-07-31 PROCEDURE — 80061 LIPID PANEL: CPT

## 2024-07-31 NOTE — TELEPHONE ENCOUNTER
ANTICOAGULATION CLINIC REFERRAL RENEWAL REQUEST       An annual renewal order is required for all patients referred to [facility].?  Please review and sign the pended referral order for Owen Sahni.       ANTICOAGULATION SUMMARY      Warfarin indication(s)   Atrial Fibrillation    Mechanical heart valve present?  NO       Current goal range   INR: 2.0-3.0     Goal appropriate for indication? Goal INR 2-3, standard for indication(s) above     Time in Therapeutic Range (TTR)  (Goal > 60%) 87%       Office visit with referring provider's group within last year yes on 6/19/2024       Danielle Meza, RN  [facility]

## 2024-07-31 NOTE — PROGRESS NOTES
ANTICOAGULATION MANAGEMENT     Owen Sahni 64 year old male is on warfarin with therapeutic INR result. (Goal INR 2.0-3.0)    Recent labs: (last 7 days)     07/31/24  1356   INR 2.8*       ASSESSMENT     Source(s): Chart Review and Patient/Caregiver Call     Warfarin doses taken: Warfarin taken as instructed  Diet: No new diet changes identified  Medication/supplement changes: None noted  New illness, injury, or hospitalization: No  Signs or symptoms of bleeding or clotting: No  Previous result: Therapeutic last 2(+) visits  Additional findings: None       PLAN     Recommended plan for no diet, medication or health factor changes affecting INR     Dosing Instructions: Continue your current warfarin dose with next INR in 6 weeks       Summary  As of 7/31/2024      Full warfarin instructions:  5 mg every Mon, Wed, Fri; 7.5 mg all other days   Next INR check:  9/4/2024               Telephone call with Collin who verbalizes understanding and agrees to plan    Lab visit scheduled    Education provided: Please call back if any changes to your diet, medications or how you've been taking warfarin    Plan made per [facility] anticoagulation protocol    Danielle Meza RN  Anticoagulation Clinic  7/31/2024    _______________________________________________________________________     Anticoagulation Episode Summary       Current INR goal:  2.0-3.0   TTR:  87.0% (1 y)   Target end date:  Indefinite   Send INR reminders to:  [facility]    Indications    Atrial fibrillation (H) [I48.91]  Long term (current) use of anticoagulants [Z79.01]  Atrial fibrillation  unspecified type (H) [I48.91]             Comments:  okay given for 8-12 week checks             Anticoagulation Care Providers       Provider Role Specialty Phone number    Evelio Oliver MD Referring Cardiovascular Disease [phone]    Braulio Coronel MD Referring Family Medicine [phone]    Grady Duncan PA-C Referring Physician Assistant - Medical  [phone]

## 2024-08-01 LAB
ALBUMIN SERPL BCG-MCNC: 4.3 G/DL (ref 3.5–5.2)
ALP SERPL-CCNC: 84 U/L (ref 40–150)
ALT SERPL W P-5'-P-CCNC: 44 U/L (ref 0–70)
ANION GAP SERPL CALCULATED.3IONS-SCNC: 10 MMOL/L (ref 7–15)
AST SERPL W P-5'-P-CCNC: 41 U/L (ref 0–45)
BILIRUB SERPL-MCNC: 0.7 MG/DL
BUN SERPL-MCNC: 18.8 MG/DL (ref 8–23)
CALCIUM SERPL-MCNC: 9.2 MG/DL (ref 8.8–10.4)
CHLORIDE SERPL-SCNC: 108 MMOL/L (ref 98–107)
CHOLEST SERPL-MCNC: 178 MG/DL
CREAT SERPL-MCNC: 1.3 MG/DL (ref 0.67–1.17)
EGFRCR SERPLBLD CKD-EPI 2021: 61 ML/MIN/1.73M2
FASTING STATUS PATIENT QL REPORTED: NO
FASTING STATUS PATIENT QL REPORTED: NO
GLUCOSE SERPL-MCNC: 82 MG/DL (ref 70–99)
HCO3 SERPL-SCNC: 24 MMOL/L (ref 22–29)
HDLC SERPL-MCNC: 43 MG/DL
HIV 1+2 AB+HIV1 P24 AG SERPL QL IA: NONREACTIVE
LDLC SERPL CALC-MCNC: 120 MG/DL
NONHDLC SERPL-MCNC: 135 MG/DL
POTASSIUM SERPL-SCNC: 4.5 MMOL/L (ref 3.4–5.3)
PROT SERPL-MCNC: 8.2 G/DL (ref 6.4–8.3)
SODIUM SERPL-SCNC: 142 MMOL/L (ref 135–145)
TRIGL SERPL-MCNC: 73 MG/DL

## 2024-08-05 DIAGNOSIS — L28.1 PRURIGO NODULARIS: ICD-10-CM

## 2024-08-06 ENCOUNTER — TELEPHONE (OUTPATIENT)
Dept: DERMATOLOGY | Facility: CLINIC | Age: 64
End: 2024-08-06
Payer: COMMERCIAL

## 2024-08-06 DIAGNOSIS — L28.1 PRURIGO NODULARIS: Primary | ICD-10-CM

## 2024-08-06 RX ORDER — FLUOCINONIDE 0.5 MG/G
CREAM TOPICAL 2 TIMES DAILY
Qty: 120 G | Refills: 0 | Status: SHIPPED | OUTPATIENT
Start: 2024-08-06 | End: 2024-09-06

## 2024-08-06 NOTE — TELEPHONE ENCOUNTER
PT request new Rx for Triamcinolone from [facility] pharmacy [id number] on [address] in [place], [place].   Tel: [phone]  Fax: [phone]    Andrew Carvalho on 8/6/2024 at 9:33 AM

## 2024-08-06 NOTE — TELEPHONE ENCOUNTER
triamcinolone (KENALOG) 0.1 % external cream (Discontinued)   454 g 0 5/28/2020 6/16/2020   Apply topically 2 times daily - Topical     Routing refill request to provider for review/approval because:  Drug not active on patient's medication list

## 2024-08-20 RX ORDER — TRIAMCINOLONE ACETONIDE 1 MG/G
CREAM TOPICAL
Qty: 80 G | Refills: 0 | Status: SHIPPED | OUTPATIENT
Start: 2024-08-20

## 2024-08-20 NOTE — TELEPHONE ENCOUNTER
Dr. 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 Reviewed nature with patient. Now negative hep C.   -continue bleach baths 3 times weekly  -Start ceterizine once daily, this can make you drowsy, do not do when driving  - Continue triamcinolone 0.1% cream to affected areas.   - SPEP slightly elevated at 1.7, will send for PCP follow up but does not appear to be relevant  -recommend Keeping nails short, wearing gloves at night, and wearing bandages over the lesions that itch the most can limit the damage from scratching.   -follow up in 2 weeks, pt may cancel if doing well and extend to 4 week follow up and treated his abdomen and legs  ___________________________________________________________    No prescription renew requests in 4 years, patient does not appear to be overusing. Will renew short supply but he has not been seen in 4 years. He will need an office visit for ongoing prescriptions.      Jass John DO    Department of Dermatology  [facility]: Phone: [phone], Fax:[phone]  [facility]: Phone: [phone], Fax: [phone]

## 2024-08-21 NOTE — TELEPHONE ENCOUNTER
Pt called and scheduled for follow up. Only wants male provider and appointment after 3, next opening was March 2025 with Maria Esther.    Shonda Thomas RN on 8/21/2024 at 8:51 AM

## 2024-09-05 DIAGNOSIS — L28.1 PRURIGO NODULARIS: ICD-10-CM

## 2024-09-06 RX ORDER — FLUOCINONIDE 0.5 MG/G
CREAM TOPICAL 2 TIMES DAILY
Qty: 120 G | Refills: 0 | Status: SHIPPED | OUTPATIENT
Start: 2024-09-06

## 2024-09-11 ENCOUNTER — LAB (OUTPATIENT)
Dept: LAB | Facility: CLINIC | Age: 64
End: 2024-09-11
Payer: COMMERCIAL

## 2024-09-11 ENCOUNTER — ANTICOAGULATION THERAPY VISIT (OUTPATIENT)
Dept: ANTICOAGULATION | Facility: CLINIC | Age: 64
End: 2024-09-11

## 2024-09-11 DIAGNOSIS — I48.91 ATRIAL FIBRILLATION, UNSPECIFIED TYPE (H): Primary | ICD-10-CM

## 2024-09-11 DIAGNOSIS — I48.91 ATRIAL FIBRILLATION, UNSPECIFIED TYPE (H): ICD-10-CM

## 2024-09-11 DIAGNOSIS — Z79.01 LONG TERM (CURRENT) USE OF ANTICOAGULANTS: ICD-10-CM

## 2024-09-11 LAB — INR BLD: 2.4 (ref 0.9–1.1)

## 2024-09-11 PROCEDURE — 85610 PROTHROMBIN TIME: CPT

## 2024-09-11 PROCEDURE — 36416 COLLJ CAPILLARY BLOOD SPEC: CPT

## 2024-09-11 NOTE — PROGRESS NOTES
ANTICOAGULATION MANAGEMENT     Owen Sahni 64 year old male is on warfarin with therapeutic INR result. (Goal INR 2.0-3.0)    Recent labs: (last 7 days)     09/11/24  1522   INR 2.4*       ASSESSMENT     Source(s): Chart Review and Patient/Caregiver Call     Warfarin doses taken: Warfarin taken as instructed  Diet: No new diet changes identified  Medication/supplement changes: None noted  New illness, injury, or hospitalization: No  Signs or symptoms of bleeding or clotting: No  Previous result: Therapeutic last 2(+) visits  Additional findings: None       PLAN     Recommended plan for no diet, medication or health factor changes affecting INR     Dosing Instructions: Continue your current warfarin dose with next INR in 6 weeks       Summary  As of 9/11/2024      Full warfarin instructions:  5 mg every Mon, Wed, Fri; 7.5 mg all other days   Next INR check:  10/23/2024               Telephone call with Collin who verbalizes understanding and agrees to plan    Lab visit scheduled    Education provided: Contact [phone] with any changes, questions or concerns.     Plan made per [facility] anticoagulation protocol    Ene Braga RN  9/11/2024  Anticoagulation Clinic  [employer] for routing messages: francine EPPERSON  [facility] patient phone line: [phone]        _______________________________________________________________________     Anticoagulation Episode Summary       Current INR goal:  2.0-3.0   TTR:  87.0% (1 y)   Target end date:  Indefinite   Send INR reminders to:  [facility]    Indications    Atrial fibrillation (H) [I48.91]  Long term (current) use of anticoagulants [Z79.01]  Atrial fibrillation  unspecified type (H) [I48.91]             Comments:  okay given for 8-12 week checks             Anticoagulation Care Providers       Provider Role Specialty Phone number    Evelio Oliver MD Referring Cardiovascular Disease [phone]    Braulio Coronel MD Referring Family Medicine [phone]     Grady Duncan PA-C Referring Physician Assistant - Medical [phone]

## 2024-10-23 ENCOUNTER — LAB (OUTPATIENT)
Dept: LAB | Facility: CLINIC | Age: 64
End: 2024-10-23
Payer: COMMERCIAL

## 2024-10-23 ENCOUNTER — ANTICOAGULATION THERAPY VISIT (OUTPATIENT)
Dept: ANTICOAGULATION | Facility: CLINIC | Age: 64
End: 2024-10-23

## 2024-10-23 DIAGNOSIS — I48.91 ATRIAL FIBRILLATION, UNSPECIFIED TYPE (H): Primary | ICD-10-CM

## 2024-10-23 DIAGNOSIS — I48.91 ATRIAL FIBRILLATION, UNSPECIFIED TYPE (H): ICD-10-CM

## 2024-10-23 DIAGNOSIS — Z79.01 LONG TERM (CURRENT) USE OF ANTICOAGULANTS: ICD-10-CM

## 2024-10-23 LAB — INR BLD: 2.2 (ref 0.9–1.1)

## 2024-10-23 PROCEDURE — 85610 PROTHROMBIN TIME: CPT

## 2024-10-23 PROCEDURE — 36416 COLLJ CAPILLARY BLOOD SPEC: CPT

## 2024-10-23 NOTE — PROGRESS NOTES
ANTICOAGULATION MANAGEMENT     Owen Sahni 64 year old male is on warfarin with therapeutic INR result. (Goal INR 2.0-3.0)    Recent labs: (last 7 days)     10/23/24  1511   INR 2.2*       ASSESSMENT     Source(s): Chart Review and Patient/Caregiver Call     Warfarin doses taken: Warfarin taken as instructed  Diet: No new diet changes identified  Medication/supplement changes: None noted  New illness, injury, or hospitalization: No  Signs or symptoms of bleeding or clotting: No  Previous result: Therapeutic last 2(+) visits  Additional findings: None       PLAN     Recommended plan for no diet, medication or health factor changes and previous 2 INR results within goal affecting INR     Dosing Instructions: Continue your current warfarin dose with next INR in 8 weeks       Summary  As of 10/23/2024      Full warfarin instructions:  5 mg every Mon, Wed, Fri; 7.5 mg all other days   Next INR check:  12/4/2024               Telephone call with Collin who verbalizes understanding and agrees to plan    Lab visit scheduled    Education provided: Contact [phone] with any changes, questions or concerns.     Plan made per [facility] anticoagulation protocol    Ene Braga RN  10/23/2024  Anticoagulation Clinic  [employer] for routing messages: francine EPPERSON  [facility] patient phone line: [phone]        _______________________________________________________________________     Anticoagulation Episode Summary       Current INR goal:  2.0-3.0   TTR:  87.0% (1 y)   Target end date:  Indefinite   Send INR reminders to:  [facility]    Indications    Atrial fibrillation (H) [I48.91]  Long term (current) use of anticoagulants [Z79.01]  Atrial fibrillation  unspecified type (H) [I48.91]             Comments:  okay given for 8-12 week checks             Anticoagulation Care Providers       Provider Role Specialty Phone number    Evelio Oliver MD Referring Cardiovascular Disease [phone]    Braulio Coronel MD  Referring Family Medicine [phone]    Grady Duncan PA-C Referring Physician Assistant - Medical [phone]

## 2024-12-19 ENCOUNTER — ANTICOAGULATION THERAPY VISIT (OUTPATIENT)
Dept: ANTICOAGULATION | Facility: CLINIC | Age: 64
End: 2024-12-19

## 2024-12-19 ENCOUNTER — TELEPHONE (OUTPATIENT)
Dept: ANTICOAGULATION | Facility: CLINIC | Age: 64
End: 2024-12-19

## 2024-12-19 ENCOUNTER — LAB (OUTPATIENT)
Dept: LAB | Facility: CLINIC | Age: 64
End: 2024-12-19
Payer: COMMERCIAL

## 2024-12-19 DIAGNOSIS — Z79.01 LONG TERM (CURRENT) USE OF ANTICOAGULANTS: ICD-10-CM

## 2024-12-19 DIAGNOSIS — I48.91 ATRIAL FIBRILLATION, UNSPECIFIED TYPE (H): ICD-10-CM

## 2024-12-19 DIAGNOSIS — I48.91 ATRIAL FIBRILLATION, UNSPECIFIED TYPE (H): Primary | ICD-10-CM

## 2024-12-19 LAB — INR BLD: 2.1 (ref 0.9–1.1)

## 2024-12-19 NOTE — PROGRESS NOTES
ANTICOAGULATION MANAGEMENT     Owen Sahni 64 year old male is on warfarin with therapeutic INR result. (Goal INR 2.0-3.0)    Recent labs: (last 7 days)     12/19/24  1405   INR 2.1*       ASSESSMENT     Source(s): Chart Review and Patient/Caregiver Call     Warfarin doses taken: Warfarin taken as instructed  Diet: No new diet changes identified  Medication/supplement changes: None noted  New illness, injury, or hospitalization: No  Signs or symptoms of bleeding or clotting: No  Previous result: Therapeutic last 2(+) visits  Additional findings: None       PLAN     Recommended plan for no diet, medication or health factor changes affecting INR     Dosing Instructions: Continue your current warfarin dose with next INR in 8 weeks       Summary  As of 12/19/2024      Full warfarin instructions:  5 mg every Mon, Wed, Fri; 7.5 mg all other days   Next INR check:  2/13/2025               Telephone call with Collin who verbalizes understanding and agrees to plan and who agrees to plan and repeated back plan correctly    Lab visit scheduled    Education provided: None required    Plan made per [facility] anticoagulation protocol    Connie Gonsales RN  12/19/2024  Anticoagulation Clinic  [employer] for routing messages: francine EPPERSON  [facility] patient phone line: [phone]        _______________________________________________________________________     Anticoagulation Episode Summary       Current INR goal:  2.0-3.0   TTR:  87.0% (1 y)   Target end date:  Indefinite   Send INR reminders to:  [facility]    Indications    Atrial fibrillation (H) [I48.91]  Long term (current) use of anticoagulants [Z79.01]  Atrial fibrillation  unspecified type (H) [I48.91]             Comments:  okay given for 8-12 week checks             Anticoagulation Care Providers       Provider Role Specialty Phone number    Evelio Oliver MD Referring Cardiovascular Disease [phone]    Braulio Coronel MD Referring Family Medicine [phone]     Grady Duncan PA-C Referring Physician Assistant - Medical [phone]

## 2024-12-19 NOTE — TELEPHONE ENCOUNTER
ANTICOAGULATION     Owen Sahni is overdue for an INR check.     Spoke with pt and scheduled lab appointment on 12/19    Connie Gonsales, RN  12/19/2024  Anticoagulation Clinic  [facility] for routing messages: francine EPPERSON  [facility] patient phone line: [phone]

## 2024-12-21 DIAGNOSIS — I48.91 ATRIAL FIBRILLATION, UNSPECIFIED TYPE (H): ICD-10-CM

## 2024-12-23 RX ORDER — WARFARIN SODIUM 5 MG/1
TABLET ORAL
Qty: 114 TABLET | Refills: 1 | Status: SHIPPED | OUTPATIENT
Start: 2024-12-23

## 2024-12-23 NOTE — TELEPHONE ENCOUNTER
ANTICOAGULATION MANAGEMENT:  Medication Refill    Anticoagulation Summary  As of 12/19/2024      Warfarin maintenance plan:  5 mg (5 mg x 1) every Mon, Wed, Fri; 7.5 mg (5 mg x 1.5) all other days   Next INR check:  2/13/2025   Target end date:  Indefinite    Indications    Atrial fibrillation (H) [I48.91]  Long term (current) use of anticoagulants [Z79.01]  Atrial fibrillation  unspecified type (H) [I48.91]                 Anticoagulation Care Providers       Provider Role Specialty Phone number    Evelio Oliver MD Referring Cardiovascular Disease [phone]    Braulio Coronel MD Referring Family Medicine [phone]    Grady Duncan PA-C Referring Physician Assistant - Medical [phone]            Refill Criteria    Visit with referring provider/group: Meets criteria: visit within referring provider group in the last 15 months on 6/19/24    [facility] referral last signed: 07/31/2024; within last year:  Yes    Lab monitoring not exceeding 2 weeks overdue: Yes    Owen meets all criteria for refill. Rx instructions and quantity match patient's current dosing plan. Warfarin 90 day supply with 1 refill granted per [facility] protocol     Aman Armenta RN  Anticoagulation Clinic

## 2025-02-13 ENCOUNTER — LAB (OUTPATIENT)
Dept: LAB | Facility: CLINIC | Age: 65
End: 2025-02-13
Payer: COMMERCIAL

## 2025-02-13 ENCOUNTER — ANTICOAGULATION THERAPY VISIT (OUTPATIENT)
Dept: ANTICOAGULATION | Facility: CLINIC | Age: 65
End: 2025-02-13

## 2025-02-13 DIAGNOSIS — I48.91 ATRIAL FIBRILLATION, UNSPECIFIED TYPE (H): Primary | ICD-10-CM

## 2025-02-13 DIAGNOSIS — I48.91 ATRIAL FIBRILLATION, UNSPECIFIED TYPE (H): ICD-10-CM

## 2025-02-13 DIAGNOSIS — Z79.01 LONG TERM (CURRENT) USE OF ANTICOAGULANTS: ICD-10-CM

## 2025-02-13 LAB — INR BLD: 2.2 (ref 0.9–1.1)

## 2025-02-13 NOTE — PROGRESS NOTES
ANTICOAGULATION MANAGEMENT     Owen Sahin 64 year old male is on warfarin with therapeutic INR result. (Goal INR 2.0-3.0)    Recent labs: (last 7 days)     02/13/25  1522   INR 2.2*       ASSESSMENT     Source(s): Chart Review  Previous INR was Therapeutic last 2(+) visits  Medication, diet, health changes since last INR chart reviewed; none identified  I left a detailed voicemail with the orders reflected in flowsheet. I have also requested a call back if there have been any missed doses, concerns, illness, fever, or if there have been any changes in medications, activity level, or diet          PLAN     Recommended plan for no diet, medication or health factor changes affecting INR     Dosing Instructions: Continue your current warfarin dose with next INR in 8 weeks       Summary  As of 2/13/2025      Full warfarin instructions:  5 mg every Mon, Wed, Fri; 7.5 mg all other days   Next INR check:  4/10/2025               Detailed voice message left for Collin with dosing instructions and follow up date.     Contact [phone] to schedule and with any changes, questions or concerns.     Education provided: Please call back if any changes to your diet, medications or how you've been taking warfarin    Plan made per [facility] anticoagulation protocol    Aman Armenta RN  2/13/2025  Anticoagulation Clinic  [facility] for routing messages: francine EPPERSON  [facility] patient phone line: [phone]        _______________________________________________________________________     Anticoagulation Episode Summary       Current INR goal:  2.0-3.0   TTR:  87.0% (1 y)   Target end date:  Indefinite   Send INR reminders to:  VIDA EPPERSON    Indications    Atrial fibrillation (H) [I48.91]  Long term (current) use of anticoagulants [Z79.01]  Atrial fibrillation  unspecified type (H) [I48.91]             Comments:  okay given for 8-12 week checks             Anticoagulation Care Providers       Provider Role Specialty Phone  number    Benito, MD Eveloi Referring Cardiovascular Disease [phone]    Braulio Coronel MD Referring Family Medicine [phone]    Grady Duncan PA-C Referring Physician Assistant - Medical [phone]

## 2025-03-24 ENCOUNTER — TELEPHONE (OUTPATIENT)
Dept: DERMATOLOGY | Facility: CLINIC | Age: 65
End: 2025-03-24

## 2025-03-24 NOTE — TELEPHONE ENCOUNTER
[facility] Call Center    Phone Message    May a detailed message be left on voicemail: yes     Reason for Call: Appointment Intake    Referring Provider Name: NA  Diagnosis and/or Symptoms: PN follow up and med refills. Pt said he was told Appt was scheduled in [place], the Appt is in [facility]. Pt missed his Appt for today 03/24/25. Pt said he was waiting many months to be seen. The Appt notes say PN (not in protocols) I am not sure what this is. I asked pt what follow-up is for - pt said rash. Unsure to schedule properly since not in protocols. Please call pt back to reschedule and wait list. Thanks     Action Taken: Message routed to:  Adult Clinics: Dermatology p [phone]    Travel Screening: Not Applicable     Date of Service:

## 2025-03-25 NOTE — TELEPHONE ENCOUNTER
Patient Contact    Attempt # 1    Was call answered?  Yes- Patient was worked in Dr. Smith's schedule for Prurigo Nodularis.     Darlene Arriola MA  [facility] Dermatology   [phone]

## 2025-03-25 NOTE — TELEPHONE ENCOUNTER
Called patient and schedule next available appointment with Dr. Mayo. Patient states he only wants to see a male provider.  Jerica Stuart on 3/25/2025 at 3:15 PM

## 2025-04-07 ENCOUNTER — ANTICOAGULATION THERAPY VISIT (OUTPATIENT)
Dept: ANTICOAGULATION | Facility: CLINIC | Age: 65
End: 2025-04-07

## 2025-04-07 ENCOUNTER — OFFICE VISIT (OUTPATIENT)
Dept: DERMATOLOGY | Facility: CLINIC | Age: 65
End: 2025-04-07
Payer: COMMERCIAL

## 2025-04-07 DIAGNOSIS — Z11.59 ENCOUNTER FOR SCREENING FOR OTHER VIRAL DISEASES: ICD-10-CM

## 2025-04-07 DIAGNOSIS — L57.0 AK (ACTINIC KERATOSIS): ICD-10-CM

## 2025-04-07 DIAGNOSIS — Z79.01 LONG TERM (CURRENT) USE OF ANTICOAGULANTS: ICD-10-CM

## 2025-04-07 DIAGNOSIS — Z85.828 HISTORY OF SKIN CANCER: ICD-10-CM

## 2025-04-07 DIAGNOSIS — D18.01 ANGIOMA OF SKIN: ICD-10-CM

## 2025-04-07 DIAGNOSIS — I48.91 ATRIAL FIBRILLATION, UNSPECIFIED TYPE (H): ICD-10-CM

## 2025-04-07 DIAGNOSIS — L81.4 LENTIGO: ICD-10-CM

## 2025-04-07 DIAGNOSIS — L82.1 SEBORRHEIC KERATOSES: ICD-10-CM

## 2025-04-07 DIAGNOSIS — L28.1 PRURIGO NODULARIS: ICD-10-CM

## 2025-04-07 DIAGNOSIS — D23.9 DERMAL NEVUS: Primary | ICD-10-CM

## 2025-04-07 DIAGNOSIS — I48.91 ATRIAL FIBRILLATION, UNSPECIFIED TYPE (H): Primary | ICD-10-CM

## 2025-04-07 LAB
ALBUMIN SERPL BCG-MCNC: 4.4 G/DL (ref 3.5–5.2)
ALP SERPL-CCNC: 105 U/L (ref 40–150)
ALT SERPL W P-5'-P-CCNC: 37 U/L (ref 0–70)
ANION GAP SERPL CALCULATED.3IONS-SCNC: 10 MMOL/L (ref 7–15)
AST SERPL W P-5'-P-CCNC: 33 U/L (ref 0–45)
BILIRUB SERPL-MCNC: 0.4 MG/DL
BUN SERPL-MCNC: 18.8 MG/DL (ref 8–23)
CALCIUM SERPL-MCNC: 9.3 MG/DL (ref 8.8–10.4)
CHLORIDE SERPL-SCNC: 104 MMOL/L (ref 98–107)
CREAT SERPL-MCNC: 1.21 MG/DL (ref 0.67–1.17)
EGFRCR SERPLBLD CKD-EPI 2021: 66 ML/MIN/1.73M2
ERYTHROCYTE [DISTWIDTH] IN BLOOD BY AUTOMATED COUNT: 13.1 % (ref 10–15)
GLUCOSE SERPL-MCNC: 86 MG/DL (ref 70–99)
HBV CORE AB SERPL QL IA: NONREACTIVE
HCO3 SERPL-SCNC: 26 MMOL/L (ref 22–29)
HCT VFR BLD AUTO: 46 % (ref 40–53)
HCV AB SERPL QL IA: NONREACTIVE
HGB BLD-MCNC: 15.3 G/DL (ref 13.3–17.7)
INR BLD: 2.8 (ref 0.9–1.1)
MCH RBC QN AUTO: 31.4 PG (ref 26.5–33)
MCHC RBC AUTO-ENTMCNC: 33.3 G/DL (ref 31.5–36.5)
MCV RBC AUTO: 94 FL (ref 78–100)
PLATELET # BLD AUTO: 216 10E3/UL (ref 150–450)
POTASSIUM SERPL-SCNC: 4.3 MMOL/L (ref 3.4–5.3)
PROT SERPL-MCNC: 8.1 G/DL (ref 6.4–8.3)
RBC # BLD AUTO: 4.88 10E6/UL (ref 4.4–5.9)
SODIUM SERPL-SCNC: 140 MMOL/L (ref 135–145)
WBC # BLD AUTO: 7.6 10E3/UL (ref 4–11)

## 2025-04-07 PROCEDURE — 86803 HEPATITIS C AB TEST: CPT | Performed by: DERMATOLOGY

## 2025-04-07 PROCEDURE — 80053 COMPREHEN METABOLIC PANEL: CPT | Performed by: DERMATOLOGY

## 2025-04-07 PROCEDURE — 86481 TB AG RESPONSE T-CELL SUSP: CPT | Performed by: DERMATOLOGY

## 2025-04-07 PROCEDURE — 85027 COMPLETE CBC AUTOMATED: CPT | Performed by: DERMATOLOGY

## 2025-04-07 PROCEDURE — 99203 OFFICE O/P NEW LOW 30 MIN: CPT | Performed by: DERMATOLOGY

## 2025-04-07 PROCEDURE — 36415 COLL VENOUS BLD VENIPUNCTURE: CPT | Performed by: DERMATOLOGY

## 2025-04-07 PROCEDURE — 86704 HEP B CORE ANTIBODY TOTAL: CPT | Performed by: DERMATOLOGY

## 2025-04-07 RX ORDER — AMITRIPTYLINE HYDROCHLORIDE 10 MG/1
10 TABLET ORAL AT BEDTIME
Qty: 60 TABLET | Refills: 3 | Status: SHIPPED | OUTPATIENT
Start: 2025-04-07

## 2025-04-07 RX ORDER — CLOBETASOL PROPIONATE 0.5 MG/G
CREAM TOPICAL
Qty: 120 G | Refills: 3 | Status: SHIPPED | OUTPATIENT
Start: 2025-04-07

## 2025-04-07 NOTE — LETTER
April 9, 2025      Collin Sahni  [address] [address]        Dear ,    We are writing to inform you of your test results.    Tb test negative     Resulted Orders   CBC with platelets   Result Value Ref Range    WBC Count 7.6 4.0 - 11.0 10e3/uL    RBC Count 4.88 4.40 - 5.90 10e6/uL    Hemoglobin 15.3 13.3 - 17.7 g/dL    Hematocrit 46.0 40.0 - 53.0 %    MCV 94 78 - 100 fL    MCH 31.4 26.5 - 33.0 pg    MCHC 33.3 31.5 - 36.5 g/dL    RDW 13.1 10.0 - 15.0 %    Platelet Count 216 150 - 450 10e3/uL   Comprehensive metabolic panel   Result Value Ref Range    Sodium 140 135 - 145 mmol/L    Potassium 4.3 3.4 - 5.3 mmol/L    Carbon Dioxide (CO2) 26 22 - 29 mmol/L    Anion Gap 10 7 - 15 mmol/L    Urea Nitrogen 18.8 8.0 - 23.0 mg/dL    Creatinine 1.21 (H) 0.67 - 1.17 mg/dL    GFR Estimate 66 >60 mL/min/1.73m2      Comment:      eGFR calculated using 2021 CKD-EPI equation.    Calcium 9.3 8.8 - 10.4 mg/dL    Chloride 104 98 - 107 mmol/L    Glucose 86 70 - 99 mg/dL    Alkaline Phosphatase 105 40 - 150 U/L    AST 33 0 - 45 U/L    ALT 37 0 - 70 U/L    Protein Total 8.1 6.4 - 8.3 g/dL    Albumin 4.4 3.5 - 5.2 g/dL    Bilirubin Total 0.4 <=1.2 mg/dL   Hepatitis B core antibody   Result Value Ref Range    Hepatitis B Core Antibody Total Nonreactive Nonreactive      Comment:      Nonreactive hepatitis B core antibody test results indicate the absence of exposure to hepatitis B virus and no evidence of recent, past/resolved, or chronic hepatitis B.    Hepatitis C antibody   Result Value Ref Range    Hepatitis C Antibody Nonreactive Nonreactive      Comment:      A nonreactive screening test result does not exclude the possibility of exposure to or infection with HCV. Nonreactive screening test results in individuals with prior exposure to HCV may be due to antibody levels below the limit of detection of this assay or lack of reactivity to the HCV antigens used in this assay. Patients with recent HCV  infections (<3 months from time of exposure) may have false-negative HCV antibody results due to the time needed for seroconversion (average of 8 to 9 weeks).   Quantiferon TB Gold Plus Grey Tube   Result Value Ref Range    Quantiferon Nil Tube 0.01 IU/mL   Quantiferon TB Gold Plus Green Tube   Result Value Ref Range    Quantiferon TB1 Tube 0.02 IU/mL   Quantiferon TB Gold Plus Yellow Tube   Result Value Ref Range    Quantiferon TB2 Tube 0.02    Quantiferon TB Gold Plus Purple Tube   Result Value Ref Range    Quantiferon Mitogen 8.06 IU/mL   Quantiferon TB Gold Plus   Result Value Ref Range    Quantiferon-TB Gold Plus Negative Negative      Comment:      No interferon gamma response to M.tuberculosis antigens was detected. Infection with M.tuberculosis is unlikely, however a single negative result does not exclude infection. In patients at high risk for infection, a second test should be considered in accordance with the 2017 ATS/IDSA/CDC Clinical Pract  ice Guidelines for Diagnosis of Tuberculosis in Adults and Children     TB1 Ag minus Nil Value 0.01 IU/mL    TB2 Ag minus Nil Value 0.01 IU/mL    Mitogen minus Nil Result 8.05 IU/mL    Nil Result 0.01 IU/mL       If you have any questions or concerns, please call the clinic at the number listed above.       Sincerely,    Dr. Glen Smith/Di Barlow LPN     Electronically signed

## 2025-04-07 NOTE — LETTER
4/7/2025      Owen aShni  [address] [facility] [address]      Dear Colleague,    Thank you for referring your patient, Owen Sahni, to the [facility]. Please see a copy of my visit note below.    Owen Sahni is an extremely pleasant 64 year old year old male patient here today for prurigo and hx of non-melanoma skin cancer.  Patient has no other skin complaints today.  Remainder of the HPI, Meds, PMH, Allergies, FH, and SH was reviewed in chart.      Past Medical History:   Diagnosis Date     Heart murmur      Hypertension      Hypertrophic cardiomyopathy (H)        Past Surgical History:   Procedure Laterality Date     COLONOSCOPY  4/12/2012    Procedure:COLONOSCOPY; Colonoscopy, screening; Surgeon:ARANZA TEE; Location:[facility] OR     COLONOSCOPY WITH CO2 INSUFFLATION N/A 10/25/2022    Procedure: COLONOSCOPY, WITH CO2 INSUFFLATION;  Surgeon: Shahzad Mai DO;  Location: [facility] OR        Family History   Problem Relation Age of Onset     Cancer Mother         breast     Cancer Father         Multiple myeloma     Hypertension Father      Arrhythmia No family hx of      Myocardial Infarction No family hx of      Diabetes No family hx of      Coronary Artery Disease No family hx of      Cerebrovascular Disease No family hx of        Social History     Socioeconomic History     Marital status: Single     Spouse name: Not on file     Number of children: Not on file     Years of education: Not on file     Highest education level: Not on file   Occupational History     Not on file   Tobacco Use     Smoking status: Never     Smokeless tobacco: Never     Tobacco comments:     non-smoking household   Vaping Use     Vaping status: Never Used   Substance and Sexual Activity     Alcohol use: Yes     Drug use: No     Sexual activity: Yes     Partners: Female   Other Topics Concern     Parent/sibling w/ CABG, MI or angioplasty before 65F 55M? No   Social History Narrative     Not  on file     Social Drivers of Health     Financial Resource Strain: Low Risk  (6/19/2024)    Financial Resource Strain      Within the past 12 months, have you or your family members you live with been unable to get utilities (heat, electricity) when it was really needed?: No   Food Insecurity: Low Risk  (6/19/2024)    Food Insecurity      Within the past 12 months, did you worry that your food would run out before you got money to buy more?: No      Within the past 12 months, did the food you bought just not last and you didn t have money to get more?: No   Transportation Needs: Low Risk  (6/19/2024)    Transportation Needs      Within the past 12 months, has lack of transportation kept you from medical appointments, getting your medicines, non-medical meetings or appointments, work, or from getting things that you need?: No   Physical Activity: Unknown (6/19/2024)    Exercise Vital Sign      Days of Exercise per Week: 5 days      Minutes of Exercise per Session: Not on file   Stress: No Stress Concern Present (6/19/2024)    Citizen of [place] [employer] - Occupational Stress Questionnaire      Feeling of Stress : Not at all   Social Connections: Unknown (6/19/2024)    Social Connection and Isolation Panel [NHANES]      Frequency of Communication with Friends and Family: Not on file      Frequency of Social Gatherings with Friends and Family: Three times a week      Attends Nondenominational Services: Not on file      Active Member of Clubs or Organizations: Not on file      Attends Club or Organization Meetings: Not on file      Marital Status: Not on file   Interpersonal Safety: Low Risk  (6/19/2024)    Interpersonal Safety      Do you feel physically and emotionally safe where you currently live?: Yes      Within the past 12 months, have you been hit, slapped, kicked or otherwise physically hurt by someone?: No      Within the past 12 months, have you been humiliated or emotionally abused in other ways by  your partner or ex-partner?: No   Housing Stability: Low Risk  (6/19/2024)    Housing Stability      Do you have housing? : Yes      Are you worried about losing your housing?: No       Outpatient Encounter Medications as of 4/7/2025   Medication Sig Dispense Refill     bisacodyl (DULCOLAX) 5 MG EC tablet 2 days prior to procedure, take 2 tablets at 4 pm. 1 day prior to procedure, take 2 tablets at 4 pm. For additional instructions refer to your colonoscopy prep instructions. 4 tablet 0     cetirizine (ZYRTEC) 10 MG tablet Take 1 tablet (10 mg) by mouth daily 90 tablet 1     clobetasol (TEMOVATE) 0.05 % external ointment Apply twice daily to bumps on arms and legs 120 g 0     fluocinonide (LIDEX) 0.05 % external cream APPLY TOPICALLY 2 TIMES DAILY DO NOT USE ON FACE. 120 g 0     losartan (COZAAR) 100 MG tablet Take 1 tablet (100 mg) by mouth daily 90 tablet 3     metFORMIN (GLUCOPHAGE) 500 MG tablet Take 1 tablet (500 mg) by mouth 2 times daily (with meals) 180 tablet 0     multivitamin w/minerals (THERA-VIT-M) tablet Take 1 tablet by mouth daily.       Na Sulfate-K Sulfate-Mg Sulf (SUPREP BOWEL PREP KIT) solution For additional instructions refer to your colonoscopy prep instructions. 354 mL 0     polyethylene glycol (GOLYTELY) 236 g suspension 2 days prior at 5pm, mix and drink half of a jug of Golytely. Drink an 8 oz. glass of Golytely every 15 minutes until half of the jug is gone. Place remainder of Golytely in the refrigerator. 1 day prior at 5 pm, drink the 2nd half of a jug of Golytely bowel prep. 6 hours before your check-in time, drink an 8 oz. glass of Golytely every 15 minutes until half of the 2nd jug of Golytely is gone. Discard remainder of second jug. 8000 mL 0     pramoxine (PRAX) 1 % LOTN lotion Apply to affected skin of arms and legs 237 mL 1     triamcinolone (KENALOG) 0.1 % external cream Apply to affected skin of trunk and extremities up to twice daily. Do not use for >2 weeks. Do not use on  face, folds, or genitals. 80 g 0     triamcinolone (KENALOG) 0.1 % external cream Apply topically 2 times daily 454 g 0     warfarin ANTICOAGULANT (COUMADIN) 5 MG tablet TAKE 1 TAB BY MOUTH ONCE DAILY MON,WED,FRI AND 1.5 TABS DAILY ALL OTHER DAYS OR AS DIRECTED. 114 tablet 1     No facility-administered encounter medications on file as of 4/7/2025.             O:   NAD, WDWN, Alert & Oriented, Mood & Affect wnl, Vitals stable   General appearance normal   Vitals stable   Alert, oriented and in no acute distress     Excoriated nodules on trunk and ext      Stuck on papules and brown macules on trunk and ext   Red papules on trunk  Flesh colored papules on trunk         Eyes: Conjunctivae/lids:Normal     ENT: Lips, mucosa: normal    MSK:Normal    Cardiovascular: peripheral edema none    Pulm: Breathing Normal    Neuro/Psych: Orientation:Alert and Orientedx3 ; Mood/Affect:normal       A/P:  1. Seborrheic keratosis, lentigo, angioma, dermal nevus, hx of non-melanoma skin cancer   2. Prurigo nodularis   Pathophysiology discussed with pateint and information provided   Claritin in am  Zyrtec bedtime  Elavil bedtime  N acetyl cysteine [id number] mg daily  Consult mtm for dupixient  Clobetasol daily   Check cbc, cmp, and tb today   It was a pleasure speaking to Owen Sahni today.  Previous clinic notes and pertinent laboratory tests were reviewed prior to Owen Sahni's visit.return to clinic 2 months      Again, thank you for allowing me to participate in the care of your patient.        Sincerely,        Glen Smith MD    Electronically signed

## 2025-04-07 NOTE — PROGRESS NOTES
ANTICOAGULATION MANAGEMENT     Owen Sahni 64 year old male is on warfarin with therapeutic INR result. (Goal INR 2.0-3.0)    Recent labs: (last 7 days)     04/07/25  1340   INR 2.8*       ASSESSMENT     Source(s): Chart Review and Patient/Caregiver Call     Warfarin doses taken: Warfarin taken as instructed  Diet: No new diet changes identified  Medication/supplement changes:  Elavil started on 4/7/25 per [facility] warfarin/elavil no interaction anticipated  claritan started on 4/7/25 No interaction anticipated  clobetasol as needed use No interaction anticipated  MTM consult for Dupixient, no interaction anticipated if he does start   New illness, injury, or hospitalization: Yes: Prurigo nodularis through derm   Signs or symptoms of bleeding or clotting: No  Previous result: Therapeutic last 2(+) visits  Additional findings: None       PLAN     Recommended plan for ongoing change(s) affecting INR     Dosing Instructions: Continue your current warfarin dose with next INR in 8 weeks       Summary  As of 4/7/2025      Full warfarin instructions:  5 mg every Mon, Wed, Fri; 7.5 mg all other days   Next INR check:  6/3/2025               Telephone call with Collin who verbalizes understanding and agrees to plan    Lab visit scheduled    Education provided: Contact [phone] with any changes, questions or concerns.     Plan made per [facility] anticoagulation protocol    Ene Braga RN  4/7/2025  Anticoagulation Clinic  [facility] for routing messages: francine EPPERSON  [facility] patient phone line: [phone]        _______________________________________________________________________     Anticoagulation Episode Summary       Current INR goal:  2.0-3.0   TTR:  91.4% (1 y)   Target end date:  Indefinite   Send INR reminders to:  VIDA EPPERSON    Indications    Atrial fibrillation (H) [I48.91]  Long term (current) use of anticoagulants [Z79.01]  Atrial fibrillation  unspecified type (H) [I48.91]              Comments:  okay given for 8-12 week checks             Anticoagulation Care Providers       Provider Role Specialty Phone number    Evelio Oliver MD Referring Cardiovascular Disease [phone]    Braulio Coronel MD Referring Family Medicine [phone]    Grady Duncan PA-C Referring Physician Assistant - Medical [phone]

## 2025-04-07 NOTE — PROGRESS NOTES
Owen Sahni is an extremely pleasant 64 year old year old male patient here today for prurigo and hx of non-melanoma skin cancer.  Patient has no other skin complaints today.  Remainder of the HPI, Meds, PMH, Allergies, FH, and SH was reviewed in chart.      Past Medical History:   Diagnosis Date    Heart murmur     Hypertension     Hypertrophic cardiomyopathy (H)        Past Surgical History:   Procedure Laterality Date    COLONOSCOPY  4/12/2012    Procedure:COLONOSCOPY; Colonoscopy, screening; Surgeon:ARANZA TEE; Location:[facility] OR    COLONOSCOPY WITH CO2 INSUFFLATION N/A 10/25/2022    Procedure: COLONOSCOPY, WITH CO2 INSUFFLATION;  Surgeon: Shahzad Mai DO;  Location: [facility] OR        Family History   Problem Relation Age of Onset    Cancer Mother         breast    Cancer Father         Multiple myeloma    Hypertension Father     Arrhythmia No family hx of     Myocardial Infarction No family hx of     Diabetes No family hx of     Coronary Artery Disease No family hx of     Cerebrovascular Disease No family hx of        Social History     Socioeconomic History    Marital status: Single     Spouse name: Not on file    Number of children: Not on file    Years of education: Not on file    Highest education level: Not on file   Occupational History    Not on file   Tobacco Use    Smoking status: Never    Smokeless tobacco: Never    Tobacco comments:     non-smoking household   Vaping Use    Vaping status: Never Used   Substance and Sexual Activity    Alcohol use: Yes    Drug use: No    Sexual activity: Yes     Partners: Female   Other Topics Concern    Parent/sibling w/ CABG, MI or angioplasty before 65F 55M? No   Social History Narrative    Not on file     Social Drivers of Health     Financial Resource Strain: Low Risk  (6/19/2024)    Financial Resource Strain     Within the past 12 months, have you or your family members you live with been unable to get utilities (heat, electricity) when it was really  needed?: No   Food Insecurity: Low Risk  (6/19/2024)    Food Insecurity     Within the past 12 months, did you worry that your food would run out before you got money to buy more?: No     Within the past 12 months, did the food you bought just not last and you didn t have money to get more?: No   Transportation Needs: Low Risk  (6/19/2024)    Transportation Needs     Within the past 12 months, has lack of transportation kept you from medical appointments, getting your medicines, non-medical meetings or appointments, work, or from getting things that you need?: No   Physical Activity: Unknown (6/19/2024)    Exercise Vital Sign     Days of Exercise per Week: 5 days     Minutes of Exercise per Session: Not on file   Stress: No Stress Concern Present (6/19/2024)    [employer] - Occupational Stress Questionnaire     Feeling of Stress : Not at all   Social Connections: Unknown (6/19/2024)    Social Connection and Isolation Panel [NHANES]     Frequency of Communication with Friends and Family: Not on file     Frequency of Social Gatherings with Friends and Family: Three times a week     Attends [employer] Services: Not on file     Active Member of Clubs or Organizations: Not on file     Attends Club or Organization Meetings: Not on file     Marital Status: Not on file   Interpersonal Safety: Low Risk  (6/19/2024)    Interpersonal Safety     Do you feel physically and emotionally safe where you currently live?: Yes     Within the past 12 months, have you been hit, slapped, kicked or otherwise physically hurt by someone?: No     Within the past 12 months, have you been humiliated or emotionally abused in other ways by your partner or ex-partner?: No   Housing Stability: Low Risk  (6/19/2024)    Housing Stability     Do you have housing? : Yes     Are you worried about losing your housing?: No       Outpatient Encounter Medications as of 4/7/2025   Medication Sig Dispense Refill    bisacodyl  (DULCOLAX) 5 MG EC tablet 2 days prior to procedure, take 2 tablets at 4 pm. 1 day prior to procedure, take 2 tablets at 4 pm. For additional instructions refer to your colonoscopy prep instructions. 4 tablet 0    cetirizine (ZYRTEC) 10 MG tablet Take 1 tablet (10 mg) by mouth daily 90 tablet 1    clobetasol (TEMOVATE) 0.05 % external ointment Apply twice daily to bumps on arms and legs 120 g 0    fluocinonide (LIDEX) 0.05 % external cream APPLY TOPICALLY 2 TIMES DAILY DO NOT USE ON FACE. 120 g 0    losartan (COZAAR) 100 MG tablet Take 1 tablet (100 mg) by mouth daily 90 tablet 3    metFORMIN (GLUCOPHAGE) 500 MG tablet Take 1 tablet (500 mg) by mouth 2 times daily (with meals) 180 tablet 0    multivitamin w/minerals (THERA-VIT-M) tablet Take 1 tablet by mouth daily.      Na Sulfate-K Sulfate-Mg Sulf (SUPREP BOWEL PREP KIT) solution For additional instructions refer to your colonoscopy prep instructions. 354 mL 0    polyethylene glycol (GOLYTELY) 236 g suspension 2 days prior at 5pm, mix and drink half of a jug of Golytely. Drink an 8 oz. glass of Golytely every 15 minutes until half of the jug is gone. Place remainder of Golytely in the refrigerator. 1 day prior at 5 pm, drink the 2nd half of a jug of Golytely bowel prep. 6 hours before your check-in time, drink an 8 oz. glass of Golytely every 15 minutes until half of the 2nd jug of Golytely is gone. Discard remainder of second jug. 8000 mL 0    pramoxine (PRAX) 1 % LOTN lotion Apply to affected skin of arms and legs 237 mL 1    triamcinolone (KENALOG) 0.1 % external cream Apply to affected skin of trunk and extremities up to twice daily. Do not use for >2 weeks. Do not use on face, folds, or genitals. 80 g 0    triamcinolone (KENALOG) 0.1 % external cream Apply topically 2 times daily 454 g 0    warfarin ANTICOAGULANT (COUMADIN) 5 MG tablet TAKE 1 TAB BY MOUTH ONCE DAILY MON,WED,FRI AND 1.5 TABS DAILY ALL OTHER DAYS OR AS DIRECTED. 114 tablet 1     No  facility-administered encounter medications on file as of 4/7/2025.             O:   NAD, WDWN, Alert & Oriented, Mood & Affect wnl, Vitals stable   General appearance normal   Vitals stable   Alert, oriented and in no acute distress     Excoriated nodules on trunk and ext      Stuck on papules and brown macules on trunk and ext   Red papules on trunk  Flesh colored papules on trunk         Eyes: Conjunctivae/lids:Normal     ENT: Lips, mucosa: normal    MSK:Normal    Cardiovascular: peripheral edema none    Pulm: Breathing Normal    Neuro/Psych: Orientation:Alert and Orientedx3 ; Mood/Affect:normal       A/P:  1. Seborrheic keratosis, lentigo, angioma, dermal nevus, hx of non-melanoma skin cancer   2. Prurigo nodularis   Pathophysiology discussed with pateint and information provided   Claritin in am  Zyrtec bedtime  Elavil bedtime  N acetyl cysteine 3733-2273 mg daily  Consult mtm for dupixient  Clobetasol daily   Check cbc, cmp, and tb today   It was a pleasure speaking to Owen Sahni today.  Previous clinic notes and pertinent laboratory tests were reviewed prior to Owen Sahni's visit.return to clinic 2 months

## 2025-04-07 NOTE — PATIENT INSTRUCTIONS
Morning:  Claritin in am  N acetyl cysteine 0942-1060 mg daily  Consult mtm for dupixient  Clobetasol 1-2 times daily      Evening:  Zyrtec bedtime  Elavil bedtime     Proper skin care from Dr. Smith- [facility]                  Eliminate harsh soaps, i.e. Dial, Zest, Rhonda Spring;             Use mild soaps such as Cetaphil or Dove Sensitive Skin             Avoid hot or cold showers             After showering, lightly dry off.              Aggressive use of a moisturizer (including Vanicream, Cetaphil, Aquaphor or Cerave)   We recommend using a tub that needs to be scooped out, not a pump. This has more of an oil base. It will hold moisture in your skin much better than a water base moisturizer. The ones recommended are non- pore clogging.                  If you have any questions call [phone] and follow the prompts to Dr. Smith's office.

## 2025-04-08 LAB
GAMMA INTERFERON BACKGROUND BLD IA-ACNC: 0.01 IU/ML
M TB IFN-G BLD-IMP: NEGATIVE
M TB IFN-G CD4+ BCKGRND COR BLD-ACNC: 8.05 IU/ML
MITOGEN IGNF BCKGRD COR BLD-ACNC: 0.01 IU/ML
MITOGEN IGNF BCKGRD COR BLD-ACNC: 0.01 IU/ML
QUANTIFERON MITOGEN: 8.06 IU/ML
QUANTIFERON NIL TUBE: 0.01 IU/ML
QUANTIFERON TB1 TUBE: 0.02 IU/ML
QUANTIFERON TB2 TUBE: 0.02

## 2025-04-09 DIAGNOSIS — L28.1 PRURIGO NODULARIS: Primary | ICD-10-CM

## 2025-04-09 NOTE — PROGRESS NOTES
Medication Therapy Management (MTM) Encounter    ASSESSMENT:                            Medication Adherence/Access: See below for considerations.    Prurigo Nodularis: Needs improvement, patient would benefit from Dupixent (dupilumab). Provided education on Dupixent (dupilumab) today including dosing, administration, side effects, precautions, monitoring, and time to efficacy. Encouraged patient to contact the Dermatology clinic in the event they have questions. Reviewed possible mood changes with amitriptyline and adding NAC if current medications are not effective enough for itching. If Dupixent is cost prohibitive, could consider exploring patient assistance program or waiting until his insurance is active through his employer to see if the copay is more affordable.    PLAN:                            Order for Dupixent (dupilumab) sent to be assessed for insurance coverage. If covered start Dupixent (dupilumab) 600 mg once followed by 300 mg every 14 days  If your insurance changes through your employer, let us know and we can submit a new prior authorization with them to determine coverage and copay amount  Continue amitriptyline 10 mg at bedtime. Monitor for changes or worsening mood.  If itching persists despite antihistamines and amitriptyline, consider adding on N-acetyl cysteine. This is available over the counter at any pharmacy. The most common strength is 600 mg per tablet or capsule. For best relief, aim to take 2000 mg to 3000 mg daily.    Follow-up: as needed via MyChart and in 1-3 months via telephone to continue assessing safety and efficacy.    SUBJECTIVE/OBJECTIVE:                          Collin Sahni is a 64 year old male called for an initial visit. He was referred to me from Dr. Glen Smith MD. A full CMR was not completed due to patient needing to leave call. It will be addressed at a follow up visit.    Reason for visit: Dupixent (dupilumab) new start.    Allergies/ADRs: Reviewed in  chart.  Past Medical History: Reviewed in chart.  Tobacco: He reports that he has never smoked. He has never used smokeless tobacco.  Alcohol: Reviewed in chart.    Medication Adherence/Access: Medication barriers: obtaining medication from insurance - [place] pending. If cost-prohibitive, hesitant to start. No MyChart, will need to call with updates.    Prurigo Nodularis:   Current treatment:  - loratadine 20 mg every morning  - cetirizine 20 mg at bedtime  - clobetasol 0.05% cream twice daily as needed   - amitriptyline 10 mg at bedtime     Symptoms: noticing improvement after starting amitriptyline - experiencing less hives, itching on arms, back of legs. Symptoms have led to sores that have bled due to scratching, on warfarin. Antihistamines also help. Will consider NAC at a later time to augment itching if it continues despite current therapies. Reports possible change in insurance with his current employer in the near future.     Side effects: denies any concerns with amitriptyline    Specialist: Dr. Glen Smith MD, Dermatology. Last visit on 4/7/2025. The following was recommended:   1. Seborrheic keratosis, lentigo, angioma, dermal nevus, hx of non-melanoma skin cancer   2. Prurigo nodularis   Pathophysiology discussed with pateint and information provided   Claritin in am  Zyrtec bedtime  Elavil bedtime  N acetyl cysteine 7118-9732 mg daily  Consult mtm for dupixient  Clobetasol daily   Check cbc, cmp, and tb today     Previous treatment:   - fluocinonide 0.05% cream  - triamcinolone 0.1% cream    Pre-Biologic Screenings      Hep B Core Antibody  Non-reactive (4/7/2025)    Hep C Antibody  Non-reactive (4/7/2025)    Quantiferon TB Gold Negative (4/7/2025)    CBC 4/7/2025   CMP 4/7/2025 (eGFR 66 mL/min)     Immunization History   Pneumococcal  Pneumovax-23: 7/8/2021  Up-to-date   Shingrix Received 1 dose 6/29/2021   All patients on biologics should avoid live vaccines (varicella/VZV, intranasal influenza,  MMR, or yellow fever vaccine (if traveling))       Today's Vitals: There were no vitals taken for this visit.  ----------------      I spent 30 minutes with this patient today. All changes were made via collaborative practice agreement with Dr. Glen Smith MD.     A summary of these recommendations was sent via [employer].    Chastity Rojo, EdwigeD, MPH  Medication Therapy Management Pharmacist  [facility]    Telemedicine Visit Details  The patient's medications can be safely assessed via a telemedicine encounter.  Type of service:  Telephone visit  Originating Location (pt. Location): Home    Distant Location (provider location):  Off-site  Start Time:  2:30 PM  End Time:  3:00 PM     Medication Therapy Recommendations  No medication therapy recommendations to display

## 2025-04-10 ENCOUNTER — VIRTUAL VISIT (OUTPATIENT)
Dept: PHARMACY | Facility: CLINIC | Age: 65
End: 2025-04-10
Attending: DERMATOLOGY
Payer: COMMERCIAL

## 2025-04-10 DIAGNOSIS — L28.1 PRURIGO NODULARIS: Primary | ICD-10-CM

## 2025-04-17 ENCOUNTER — TELEPHONE (OUTPATIENT)
Dept: PHARMACY | Facility: CLINIC | Age: 65
End: 2025-04-17
Payer: COMMERCIAL

## 2025-04-17 NOTE — TELEPHONE ENCOUNTER
Dupixent was approved and pt needs a 30 minute follow up to go over the new medication    Call placed to pt to initiate scheduling on 04/17/25    Outcome: LVM

## 2025-04-24 ENCOUNTER — TELEPHONE (OUTPATIENT)
Dept: PHARMACY | Facility: CLINIC | Age: 65
End: 2025-04-24
Payer: COMMERCIAL

## 2025-05-20 ENCOUNTER — PATIENT OUTREACH (OUTPATIENT)
Dept: CARE COORDINATION | Facility: CLINIC | Age: 65
End: 2025-05-20
Payer: COMMERCIAL

## 2025-06-03 ENCOUNTER — PATIENT OUTREACH (OUTPATIENT)
Dept: CARE COORDINATION | Facility: CLINIC | Age: 65
End: 2025-06-03

## 2025-06-03 ENCOUNTER — ANTICOAGULATION THERAPY VISIT (OUTPATIENT)
Dept: ANTICOAGULATION | Facility: CLINIC | Age: 65
End: 2025-06-03

## 2025-06-03 ENCOUNTER — LAB (OUTPATIENT)
Dept: LAB | Facility: CLINIC | Age: 65
End: 2025-06-03
Payer: COMMERCIAL

## 2025-06-03 DIAGNOSIS — Z79.01 LONG TERM (CURRENT) USE OF ANTICOAGULANTS: ICD-10-CM

## 2025-06-03 DIAGNOSIS — I48.91 ATRIAL FIBRILLATION (H): Primary | ICD-10-CM

## 2025-06-03 DIAGNOSIS — I48.91 ATRIAL FIBRILLATION, UNSPECIFIED TYPE (H): ICD-10-CM

## 2025-06-03 LAB — INR BLD: 2 (ref 0.9–1.1)

## 2025-06-03 PROCEDURE — 36416 COLLJ CAPILLARY BLOOD SPEC: CPT

## 2025-06-03 PROCEDURE — 85610 PROTHROMBIN TIME: CPT

## 2025-06-03 NOTE — PROGRESS NOTES
ANTICOAGULATION MANAGEMENT     Owen Sahni 64 year old male is on warfarin with therapeutic INR result. (Goal INR 2.0-3.0)    Recent labs: (last 7 days)     06/03/25  1510   INR 2.0*       ASSESSMENT     Source(s): Chart Review and Patient/Caregiver Call     Warfarin doses taken: Warfarin taken as instructed  Diet: No new diet changes identified  Medication/supplement changes: None noted  New illness, injury, or hospitalization: No  Signs or symptoms of bleeding or clotting: No  Previous result: Therapeutic last 2(+) visits  Additional findings: None       PLAN     Recommended plan for no diet, medication or health factor changes affecting INR     Dosing Instructions: Continue your current warfarin dose with next INR in 8 weeks   6 weeks recommended states he will come in 8 when stable apt made for 8 weeks    Summary  As of 6/3/2025      Full warfarin instructions:  5 mg every Mon, Wed, Fri; 7.5 mg all other days   Next INR check:  7/29/2025               Telephone call with Collin who verbalizes understanding and agrees to plan    Lab visit scheduled    Education provided: Please call back if any changes to your diet, medications or how you've been taking warfarin    Plan made per [facility] anticoagulation protocol    Danielle Meza RN  6/3/2025  Anticoagulation Clinic  [employer] for routing messages: francine EPPERSON  [facility] patient phone line: [phone]        _______________________________________________________________________     Anticoagulation Episode Summary       Current INR goal:  2.0-3.0   TTR:  100.0% (1 y)   Target end date:  Indefinite   Send INR reminders to:  VIDA EPPERSON    Indications    Atrial fibrillation (H) [I48.91]  Long term (current) use of anticoagulants [Z79.01]  Atrial fibrillation  unspecified type (H) [I48.91]             Comments:  okay given for 8-12 week checks             Anticoagulation Care Providers       Provider Role Specialty Phone number    Evelio Oliver MD Referring  Cardiovascular Disease [phone]    Braulio Coronel MD Referring Family Medicine [phone]    Grady Duncan PA-C Referring Physician Assistant - Medical [phone]

## 2025-06-24 ENCOUNTER — PATIENT OUTREACH (OUTPATIENT)
Dept: FAMILY MEDICINE | Facility: CLINIC | Age: 65
End: 2025-06-24
Payer: COMMERCIAL

## 2025-07-04 NOTE — PROGRESS NOTES
Orders for Dupixent (dupilumab) sent to be assessed for coverage per CPA    Chastity Rojo PharmD, MPH  Medication Therapy Management Pharmacist  [facility]    see mdm note.

## 2025-07-29 ENCOUNTER — ANTICOAGULATION THERAPY VISIT (OUTPATIENT)
Dept: ANTICOAGULATION | Facility: CLINIC | Age: 65
End: 2025-07-29

## 2025-07-29 ENCOUNTER — RESULTS FOLLOW-UP (OUTPATIENT)
Dept: ANTICOAGULATION | Facility: CLINIC | Age: 65
End: 2025-07-29

## 2025-07-29 ENCOUNTER — DOCUMENTATION ONLY (OUTPATIENT)
Dept: ANTICOAGULATION | Facility: CLINIC | Age: 65
End: 2025-07-29

## 2025-07-29 ENCOUNTER — LAB (OUTPATIENT)
Dept: LAB | Facility: CLINIC | Age: 65
End: 2025-07-29
Payer: COMMERCIAL

## 2025-07-29 DIAGNOSIS — Z79.01 LONG TERM (CURRENT) USE OF ANTICOAGULANTS: ICD-10-CM

## 2025-07-29 DIAGNOSIS — I48.91 ATRIAL FIBRILLATION, UNSPECIFIED TYPE (H): Primary | ICD-10-CM

## 2025-07-29 DIAGNOSIS — I48.91 ATRIAL FIBRILLATION, UNSPECIFIED TYPE (H): ICD-10-CM

## 2025-07-29 LAB — INR BLD: 3.3 (ref 0.9–1.1)

## 2025-07-29 PROCEDURE — 85610 PROTHROMBIN TIME: CPT

## 2025-07-29 PROCEDURE — 36416 COLLJ CAPILLARY BLOOD SPEC: CPT

## 2025-07-29 NOTE — PROGRESS NOTES
ANTICOAGULATION CLINIC REFERRAL RENEWAL REQUEST       An annual renewal order is required for all patients referred to [facility].?  Please review and sign the pended referral order for Owen aShni.       ANTICOAGULATION SUMMARY      Warfarin indication(s)   Atrial Fibrillation    Mechanical heart valve present?  NO       Current goal range   INR: 2.0-3.0     Goal appropriate for indication? Goal INR 2-3, standard for indication(s) above     Time in Therapeutic Range (TTR)  (Goal > 60%) 96.5%       Office visit with referring provider's group within last year No; last on 6/19/24   Additional standing orders Extended interval rechecks every 8-12 weeks, extending + 2 weeks after each additional therapeutic result while on stable maintenance therapy        Connie Gonsales RN  [facility]

## 2025-07-29 NOTE — PROGRESS NOTES
ANTICOAGULATION MANAGEMENT     Owen Sahni 65 year old male is on warfarin with supratherapeutic INR result. (Goal INR 2.0-3.0)    Recent labs: (last 7 days)     07/29/25  1502   INR 3.3*       ASSESSMENT     Source(s): Chart Review and Patient/Caregiver Call     Warfarin doses taken: Warfarin taken as instructed  Diet: No new diet changes identified  Medication/supplement changes: None noted  New illness, injury, or hospitalization: No  Signs or symptoms of bleeding or clotting: No  Previous result: Therapeutic last 2(+) visits  Additional findings: None       PLAN     Recommended plan for no diet, medication or health factor changes affecting INR     Dosing Instructions: Continue your current warfarin dose with next INR in 2 weeks       Summary  As of 7/29/2025      Full warfarin instructions:  5 mg every Mon, Wed, Fri; 7.5 mg all other days   Next INR check:  8/12/2025               Telephone call with Collni who verbalizes understanding and agrees to plan and who agrees to plan and repeated back plan correctly    Lab visit scheduled    Education provided: None required    Plan made per [facility] anticoagulation protocol    Connie Gonsales RN  7/29/2025  Anticoagulation Clinic  [employer] for routing messages: francine EPPERSON  [facility] patient phone line: [phone]        _______________________________________________________________________     Anticoagulation Episode Summary       Current INR goal:  2.0-3.0   TTR:  96.5% (1 y)   Target end date:  Indefinite   Send INR reminders to:  VIDA EPPERSON    Indications    Atrial fibrillation (H) [I48.91]  Long term (current) use of anticoagulants [Z79.01]  Atrial fibrillation  unspecified type (H) [I48.91]             Comments:  okay given for 8-12 week checks             Anticoagulation Care Providers       Provider Role Specialty Phone number    Evelio Oliver MD Referring Cardiovascular Disease [phone]    Braulio Coronel MD Referring Family Medicine [phone]     Grady Duncan PA-C Referring Physician Assistant - Medical [phone]

## 2025-08-12 ENCOUNTER — LAB (OUTPATIENT)
Dept: LAB | Facility: CLINIC | Age: 65
End: 2025-08-12
Payer: COMMERCIAL

## 2025-08-12 ENCOUNTER — ANTICOAGULATION THERAPY VISIT (OUTPATIENT)
Dept: ANTICOAGULATION | Facility: CLINIC | Age: 65
End: 2025-08-12

## 2025-08-12 DIAGNOSIS — Z79.01 LONG TERM (CURRENT) USE OF ANTICOAGULANTS: ICD-10-CM

## 2025-08-12 DIAGNOSIS — I48.91 ATRIAL FIBRILLATION, UNSPECIFIED TYPE (H): ICD-10-CM

## 2025-08-12 DIAGNOSIS — I48.91 ATRIAL FIBRILLATION, UNSPECIFIED TYPE (H): Primary | ICD-10-CM

## 2025-08-12 LAB — INR BLD: 2.1 (ref 0.9–1.1)

## 2025-08-12 PROCEDURE — 85610 PROTHROMBIN TIME: CPT

## 2025-08-12 PROCEDURE — 36415 COLL VENOUS BLD VENIPUNCTURE: CPT

## (undated) DEVICE — KIT ENDO FIRST STEP DISINFECTANT 200ML W/POUCH EP-4

## (undated) DEVICE — PAD CHUX UNDERPAD 23X24" 7136